# Patient Record
Sex: FEMALE | Race: WHITE | Employment: OTHER | URBAN - METROPOLITAN AREA
[De-identification: names, ages, dates, MRNs, and addresses within clinical notes are randomized per-mention and may not be internally consistent; named-entity substitution may affect disease eponyms.]

---

## 2017-05-12 ENCOUNTER — APPOINTMENT (OUTPATIENT)
Dept: GENERAL RADIOLOGY | Age: 82
End: 2017-05-12
Payer: MEDICARE

## 2017-05-12 ENCOUNTER — HOSPITAL ENCOUNTER (EMERGENCY)
Age: 82
Discharge: HOME | End: 2017-05-12
Attending: EMERGENCY MEDICINE
Payer: MEDICARE

## 2017-05-12 VITALS
RESPIRATION RATE: 16 BRPM | WEIGHT: 180 LBS | HEART RATE: 76 BPM | SYSTOLIC BLOOD PRESSURE: 146 MMHG | HEIGHT: 62 IN | DIASTOLIC BLOOD PRESSURE: 82 MMHG | TEMPERATURE: 98 F | OXYGEN SATURATION: 100 % | BODY MASS INDEX: 33.13 KG/M2

## 2017-05-12 DIAGNOSIS — S43.401A SPRAIN OF RIGHT SHOULDER, UNSPECIFIED SHOULDER SPRAIN TYPE, INITIAL ENCOUNTER: Primary | ICD-10-CM

## 2017-05-12 PROCEDURE — 99283 EMERGENCY DEPT VISIT LOW MDM: CPT

## 2017-05-12 PROCEDURE — 73030 X-RAY EXAM OF SHOULDER: CPT

## 2017-05-12 RX ORDER — IBUPROFEN 400 MG/1
400 TABLET ORAL 2 TIMES DAILY PRN
Qty: 14 TABLET | Refills: 0 | Status: SHIPPED | OUTPATIENT
Start: 2017-05-12 | End: 2017-05-17 | Stop reason: ALTCHOICE

## 2017-05-12 ASSESSMENT — ENCOUNTER SYMPTOMS
GASTROINTESTINAL NEGATIVE: 1
RESPIRATORY NEGATIVE: 1
ALLERGIC/IMMUNOLOGIC NEGATIVE: 1
EYES NEGATIVE: 1

## 2017-05-12 ASSESSMENT — PAIN SCALES - GENERAL: PAINLEVEL_OUTOF10: 9

## 2017-05-12 ASSESSMENT — PAIN DESCRIPTION - ORIENTATION: ORIENTATION: RIGHT

## 2017-05-12 ASSESSMENT — PAIN DESCRIPTION - FREQUENCY: FREQUENCY: CONTINUOUS

## 2017-05-12 ASSESSMENT — PAIN DESCRIPTION - DESCRIPTORS: DESCRIPTORS: ACHING;SORE

## 2017-05-12 ASSESSMENT — PAIN DESCRIPTION - LOCATION: LOCATION: SHOULDER

## 2017-05-17 ENCOUNTER — OFFICE VISIT (OUTPATIENT)
Dept: FAMILY MEDICINE CLINIC | Age: 82
End: 2017-05-17
Payer: MEDICARE

## 2017-05-17 VITALS
BODY MASS INDEX: 32.37 KG/M2 | TEMPERATURE: 98.6 F | HEART RATE: 68 BPM | DIASTOLIC BLOOD PRESSURE: 74 MMHG | WEIGHT: 177 LBS | SYSTOLIC BLOOD PRESSURE: 114 MMHG | OXYGEN SATURATION: 96 %

## 2017-05-17 DIAGNOSIS — S43.91XD SPRAIN OF RIGHT SHOULDER GIRDLE, SUBSEQUENT ENCOUNTER: Primary | ICD-10-CM

## 2017-05-17 DIAGNOSIS — R42 DIZZINESS, NONSPECIFIC: ICD-10-CM

## 2017-05-17 DIAGNOSIS — Z91.81 AT HIGH RISK FOR FALLS: ICD-10-CM

## 2017-05-17 DIAGNOSIS — Z09 HOSPITAL DISCHARGE FOLLOW-UP: ICD-10-CM

## 2017-05-17 DIAGNOSIS — M19.011 PRIMARY OSTEOARTHRITIS OF RIGHT SHOULDER: ICD-10-CM

## 2017-05-17 DIAGNOSIS — L98.9 EXTERNAL NASAL LESION: ICD-10-CM

## 2017-05-17 DIAGNOSIS — J06.9 VIRAL URI WITH COUGH: ICD-10-CM

## 2017-05-17 PROCEDURE — G8417 CALC BMI ABV UP PARAM F/U: HCPCS | Performed by: INTERNAL MEDICINE

## 2017-05-17 PROCEDURE — G8427 DOCREV CUR MEDS BY ELIG CLIN: HCPCS | Performed by: INTERNAL MEDICINE

## 2017-05-17 PROCEDURE — 99214 OFFICE O/P EST MOD 30 MIN: CPT | Performed by: INTERNAL MEDICINE

## 2017-05-17 PROCEDURE — 1036F TOBACCO NON-USER: CPT | Performed by: INTERNAL MEDICINE

## 2017-05-17 PROCEDURE — 1123F ACP DISCUSS/DSCN MKR DOCD: CPT | Performed by: INTERNAL MEDICINE

## 2017-05-17 PROCEDURE — 4040F PNEUMOC VAC/ADMIN/RCVD: CPT | Performed by: INTERNAL MEDICINE

## 2017-05-17 PROCEDURE — 1090F PRES/ABSN URINE INCON ASSESS: CPT | Performed by: INTERNAL MEDICINE

## 2017-05-17 RX ORDER — COVID-19 ANTIGEN TEST
1 KIT MISCELLANEOUS PRN
COMMUNITY
End: 2017-06-14 | Stop reason: ALTCHOICE

## 2017-05-17 RX ORDER — NAPROXEN 500 MG/1
500 TABLET ORAL 2 TIMES DAILY WITH MEALS
Qty: 28 TABLET | Refills: 0 | Status: SHIPPED | OUTPATIENT
Start: 2017-05-17 | End: 2017-06-20

## 2017-05-17 RX ORDER — OXYMETAZOLINE HYDROCHLORIDE 0.05 G/100ML
2 SPRAY NASAL 2 TIMES DAILY
Qty: 1 BOTTLE | Refills: 0 | Status: SHIPPED | OUTPATIENT
Start: 2017-05-17 | End: 2017-05-20

## 2017-06-07 ENCOUNTER — OFFICE VISIT (OUTPATIENT)
Dept: FAMILY MEDICINE CLINIC | Age: 82
End: 2017-06-07
Payer: MEDICARE

## 2017-06-07 VITALS
BODY MASS INDEX: 32.92 KG/M2 | SYSTOLIC BLOOD PRESSURE: 135 MMHG | WEIGHT: 180 LBS | TEMPERATURE: 97 F | HEART RATE: 51 BPM | DIASTOLIC BLOOD PRESSURE: 71 MMHG | OXYGEN SATURATION: 97 %

## 2017-06-07 DIAGNOSIS — Z86.69 HISTORY OF MYASTHENIA GRAVIS: ICD-10-CM

## 2017-06-07 DIAGNOSIS — G89.29 CHRONIC NONINTRACTABLE HEADACHE, UNSPECIFIED HEADACHE TYPE: ICD-10-CM

## 2017-06-07 DIAGNOSIS — Z09 HOSPITAL DISCHARGE FOLLOW-UP: ICD-10-CM

## 2017-06-07 DIAGNOSIS — M32.9 PERSONAL HISTORY OF SYSTEMIC LUPUS ERYTHEMATOSUS (SLE) (HCC): ICD-10-CM

## 2017-06-07 DIAGNOSIS — I67.82 SUBCORTICAL MICROVASCULAR ISCHEMIC OCCLUSIVE DISEASE: ICD-10-CM

## 2017-06-07 DIAGNOSIS — E78.00 PURE HYPERCHOLESTEROLEMIA: ICD-10-CM

## 2017-06-07 DIAGNOSIS — I72.9 ANEURYSM (HCC): Primary | ICD-10-CM

## 2017-06-07 DIAGNOSIS — R51.9 CHRONIC NONINTRACTABLE HEADACHE, UNSPECIFIED HEADACHE TYPE: ICD-10-CM

## 2017-06-07 DIAGNOSIS — I10 ESSENTIAL HYPERTENSION: ICD-10-CM

## 2017-06-07 PROCEDURE — 1036F TOBACCO NON-USER: CPT | Performed by: INTERNAL MEDICINE

## 2017-06-07 PROCEDURE — 99214 OFFICE O/P EST MOD 30 MIN: CPT | Performed by: INTERNAL MEDICINE

## 2017-06-07 PROCEDURE — G8417 CALC BMI ABV UP PARAM F/U: HCPCS | Performed by: INTERNAL MEDICINE

## 2017-06-07 PROCEDURE — 1090F PRES/ABSN URINE INCON ASSESS: CPT | Performed by: INTERNAL MEDICINE

## 2017-06-07 PROCEDURE — G8427 DOCREV CUR MEDS BY ELIG CLIN: HCPCS | Performed by: INTERNAL MEDICINE

## 2017-06-07 PROCEDURE — 1123F ACP DISCUSS/DSCN MKR DOCD: CPT | Performed by: INTERNAL MEDICINE

## 2017-06-07 PROCEDURE — 4040F PNEUMOC VAC/ADMIN/RCVD: CPT | Performed by: INTERNAL MEDICINE

## 2017-06-07 RX ORDER — COVID-19 ANTIGEN TEST
1 KIT MISCELLANEOUS DAILY
COMMUNITY
End: 2017-06-07 | Stop reason: SDUPTHER

## 2017-06-07 RX ORDER — NIFEDIPINE 30 MG/1
30 TABLET, EXTENDED RELEASE ORAL DAILY
Qty: 30 TABLET | Refills: 3 | Status: ON HOLD | OUTPATIENT
Start: 2017-06-07 | End: 2017-06-27 | Stop reason: HOSPADM

## 2017-06-07 RX ORDER — BUTALBITAL, ACETAMINOPHEN AND CAFFEINE 50; 325; 40 MG/1; MG/1; MG/1
1 TABLET ORAL PRN
Refills: 0 | COMMUNITY
Start: 2017-06-05 | End: 2017-09-25 | Stop reason: ALTCHOICE

## 2017-06-07 RX ORDER — MECLIZINE HYDROCHLORIDE 25 MG/1
1 TABLET ORAL 3 TIMES DAILY
Refills: 0 | COMMUNITY
Start: 2017-06-05 | End: 2018-05-08 | Stop reason: SDUPTHER

## 2017-06-07 RX ORDER — PROPRANOLOL HYDROCHLORIDE 20 MG/1
1 TABLET ORAL 2 TIMES DAILY
Refills: 0 | COMMUNITY
Start: 2017-06-05 | End: 2017-06-20 | Stop reason: SDUPTHER

## 2017-06-08 ENCOUNTER — HOSPITAL ENCOUNTER (OUTPATIENT)
Age: 82
Setting detail: SPECIMEN
Discharge: HOME | End: 2017-06-08
Payer: MEDICARE

## 2017-06-08 DIAGNOSIS — M32.9 PERSONAL HISTORY OF SYSTEMIC LUPUS ERYTHEMATOSUS (SLE) (HCC): ICD-10-CM

## 2017-06-08 DIAGNOSIS — E78.00 PURE HYPERCHOLESTEROLEMIA: ICD-10-CM

## 2017-06-08 LAB
ALBUMIN SERPL-MCNC: 3.9 G/DL (ref 3.5–5.2)
ALBUMIN/GLOBULIN RATIO: 1.3 (ref 1–2.5)
ALP BLD-CCNC: 73 U/L (ref 35–104)
ALT SERPL-CCNC: 9 U/L (ref 5–33)
ANION GAP SERPL CALCULATED.3IONS-SCNC: 10 MMOL/L (ref 9–17)
AST SERPL-CCNC: 17 U/L
BILIRUB SERPL-MCNC: 0.51 MG/DL (ref 0.3–1.2)
BUN BLDV-MCNC: 15 MG/DL (ref 8–23)
BUN/CREAT BLD: NORMAL (ref 9–20)
CALCIUM SERPL-MCNC: 9.2 MG/DL (ref 8.6–10.4)
CHLORIDE BLD-SCNC: 103 MMOL/L (ref 98–107)
CHOLESTEROL/HDL RATIO: 2
CHOLESTEROL: 172 MG/DL
CO2: 28 MMOL/L (ref 20–31)
CREAT SERPL-MCNC: 0.77 MG/DL (ref 0.5–0.9)
GFR AFRICAN AMERICAN: >60 ML/MIN
GFR NON-AFRICAN AMERICAN: >60 ML/MIN
GFR SERPL CREATININE-BSD FRML MDRD: NORMAL ML/MIN/{1.73_M2}
GFR SERPL CREATININE-BSD FRML MDRD: NORMAL ML/MIN/{1.73_M2}
GLUCOSE BLD-MCNC: 87 MG/DL (ref 70–99)
HDLC SERPL-MCNC: 85 MG/DL
LDL CHOLESTEROL: 74 MG/DL (ref 0–130)
POTASSIUM SERPL-SCNC: 4.9 MMOL/L (ref 3.7–5.3)
SODIUM BLD-SCNC: 141 MMOL/L (ref 135–144)
TOTAL PROTEIN: 7 G/DL (ref 6.4–8.3)
TRIGL SERPL-MCNC: 67 MG/DL
VLDLC SERPL CALC-MCNC: NORMAL MG/DL (ref 1–30)

## 2017-06-09 LAB — ANTI-NUCLEAR ANTIBODY (ANA): NEGATIVE

## 2017-06-12 LAB
ANTICARDIOLIPIN IGA ANTIBODY: 10.8 APU
ANTICARDIOLIPIN IGG ANTIBODY: 5 GPU
CARDIOLIPIN AB IGM: 3.8 MPU
DILUTE RUSSELL VIPER VENOM TIME: NORMAL
INR BLD: 1
LUPUS ANTICOAG: NORMAL
PARTIAL THROMBOPLASTIN TIME: 23.5 SEC (ref 21.3–31.3)
PROTHROMBIN TIME: 10.6 SEC (ref 9.4–12.6)

## 2017-06-14 ENCOUNTER — INITIAL CONSULT (OUTPATIENT)
Dept: NEUROLOGY | Age: 82
End: 2017-06-14
Payer: MEDICARE

## 2017-06-14 VITALS
WEIGHT: 177.8 LBS | SYSTOLIC BLOOD PRESSURE: 123 MMHG | HEIGHT: 60 IN | BODY MASS INDEX: 34.91 KG/M2 | HEART RATE: 76 BPM | DIASTOLIC BLOOD PRESSURE: 70 MMHG

## 2017-06-14 DIAGNOSIS — I72.5 BASILAR ARTERY ANEURYSM (HCC): Primary | ICD-10-CM

## 2017-06-14 PROCEDURE — 1123F ACP DISCUSS/DSCN MKR DOCD: CPT | Performed by: PSYCHIATRY & NEUROLOGY

## 2017-06-14 PROCEDURE — 1090F PRES/ABSN URINE INCON ASSESS: CPT | Performed by: PSYCHIATRY & NEUROLOGY

## 2017-06-14 PROCEDURE — G8417 CALC BMI ABV UP PARAM F/U: HCPCS | Performed by: PSYCHIATRY & NEUROLOGY

## 2017-06-14 PROCEDURE — 4040F PNEUMOC VAC/ADMIN/RCVD: CPT | Performed by: PSYCHIATRY & NEUROLOGY

## 2017-06-14 PROCEDURE — G8427 DOCREV CUR MEDS BY ELIG CLIN: HCPCS | Performed by: PSYCHIATRY & NEUROLOGY

## 2017-06-14 PROCEDURE — 99204 OFFICE O/P NEW MOD 45 MIN: CPT | Performed by: PSYCHIATRY & NEUROLOGY

## 2017-06-14 PROCEDURE — 1036F TOBACCO NON-USER: CPT | Performed by: PSYCHIATRY & NEUROLOGY

## 2017-06-14 ASSESSMENT — ENCOUNTER SYMPTOMS
COLOR CHANGE: 0
NAUSEA: 0
SHORTNESS OF BREATH: 0
VOMITING: 0
VOICE CHANGE: 0
COUGH: 0
PHOTOPHOBIA: 0

## 2017-06-19 ENCOUNTER — TELEPHONE (OUTPATIENT)
Dept: NEUROLOGY | Age: 82
End: 2017-06-19

## 2017-06-20 ENCOUNTER — OFFICE VISIT (OUTPATIENT)
Dept: FAMILY MEDICINE CLINIC | Age: 82
End: 2017-06-20
Payer: MEDICARE

## 2017-06-20 VITALS — HEART RATE: 54 BPM | DIASTOLIC BLOOD PRESSURE: 69 MMHG | SYSTOLIC BLOOD PRESSURE: 120 MMHG | OXYGEN SATURATION: 98 %

## 2017-06-20 DIAGNOSIS — I10 ESSENTIAL HYPERTENSION: ICD-10-CM

## 2017-06-20 DIAGNOSIS — G43.009 MIGRAINE WITHOUT AURA AND WITHOUT STATUS MIGRAINOSUS, NOT INTRACTABLE: Primary | ICD-10-CM

## 2017-06-20 DIAGNOSIS — M79.604 LEG PAIN, POSTERIOR, RIGHT: ICD-10-CM

## 2017-06-20 DIAGNOSIS — I67.82 SUBCORTICAL MICROVASCULAR ISCHEMIC OCCLUSIVE DISEASE: ICD-10-CM

## 2017-06-20 PROCEDURE — G8417 CALC BMI ABV UP PARAM F/U: HCPCS | Performed by: INTERNAL MEDICINE

## 2017-06-20 PROCEDURE — 1036F TOBACCO NON-USER: CPT | Performed by: INTERNAL MEDICINE

## 2017-06-20 PROCEDURE — 4040F PNEUMOC VAC/ADMIN/RCVD: CPT | Performed by: INTERNAL MEDICINE

## 2017-06-20 PROCEDURE — 1123F ACP DISCUSS/DSCN MKR DOCD: CPT | Performed by: INTERNAL MEDICINE

## 2017-06-20 PROCEDURE — G8427 DOCREV CUR MEDS BY ELIG CLIN: HCPCS | Performed by: INTERNAL MEDICINE

## 2017-06-20 PROCEDURE — 99214 OFFICE O/P EST MOD 30 MIN: CPT | Performed by: INTERNAL MEDICINE

## 2017-06-20 PROCEDURE — G8598 ASA/ANTIPLAT THER USED: HCPCS | Performed by: INTERNAL MEDICINE

## 2017-06-20 PROCEDURE — 1090F PRES/ABSN URINE INCON ASSESS: CPT | Performed by: INTERNAL MEDICINE

## 2017-06-20 RX ORDER — TRAMADOL HYDROCHLORIDE 50 MG/1
50 TABLET ORAL EVERY 6 HOURS PRN
Qty: 20 TABLET | Refills: 0 | Status: SHIPPED | OUTPATIENT
Start: 2017-06-20 | End: 2017-06-20 | Stop reason: SDUPTHER

## 2017-06-20 RX ORDER — TRAMADOL HYDROCHLORIDE 50 MG/1
50 TABLET ORAL EVERY 6 HOURS PRN
Qty: 20 TABLET | Refills: 0 | Status: SHIPPED | OUTPATIENT
Start: 2017-06-20 | End: 2017-06-30

## 2017-06-20 RX ORDER — PROPRANOLOL HYDROCHLORIDE 40 MG/1
40 TABLET ORAL 2 TIMES DAILY
Qty: 60 TABLET | Refills: 1 | Status: ON HOLD | OUTPATIENT
Start: 2017-06-20 | End: 2017-06-27 | Stop reason: HOSPADM

## 2017-06-20 RX ORDER — TIZANIDINE 2 MG/1
2 TABLET ORAL EVERY 6 HOURS PRN
Qty: 30 TABLET | Refills: 0 | Status: ON HOLD | OUTPATIENT
Start: 2017-06-20 | End: 2017-06-27 | Stop reason: HOSPADM

## 2017-06-23 ENCOUNTER — HOSPITAL ENCOUNTER (OUTPATIENT)
Dept: MRI IMAGING | Age: 82
Discharge: HOME | End: 2017-06-23
Payer: MEDICARE

## 2017-06-23 ENCOUNTER — HOSPITAL ENCOUNTER (OUTPATIENT)
Dept: VASCULAR LAB | Age: 82
Discharge: HOME | End: 2017-06-23
Payer: MEDICARE

## 2017-06-23 DIAGNOSIS — I72.5 BASILAR ARTERY ANEURYSM (HCC): ICD-10-CM

## 2017-06-23 DIAGNOSIS — M79.652 PAIN IN LEFT THIGH: ICD-10-CM

## 2017-06-23 DIAGNOSIS — M79.605 PAIN IN BOTH LOWER EXTREMITIES: ICD-10-CM

## 2017-06-23 DIAGNOSIS — I63.9 CEREBROVASCULAR ACCIDENT (CVA), UNSPECIFIED MECHANISM (HCC): Primary | ICD-10-CM

## 2017-06-23 DIAGNOSIS — M79.604 PAIN IN BOTH LOWER EXTREMITIES: ICD-10-CM

## 2017-06-23 PROCEDURE — 93970 EXTREMITY STUDY: CPT

## 2017-06-23 PROCEDURE — 93880 EXTRACRANIAL BILAT STUDY: CPT

## 2017-06-23 PROCEDURE — 70544 MR ANGIOGRAPHY HEAD W/O DYE: CPT

## 2017-06-26 ENCOUNTER — APPOINTMENT (OUTPATIENT)
Dept: MRI IMAGING | Age: 82
DRG: 310 | End: 2017-06-26
Payer: MEDICARE

## 2017-06-26 ENCOUNTER — APPOINTMENT (OUTPATIENT)
Dept: GENERAL RADIOLOGY | Age: 82
DRG: 310 | End: 2017-06-26
Payer: MEDICARE

## 2017-06-26 ENCOUNTER — APPOINTMENT (OUTPATIENT)
Dept: CT IMAGING | Age: 82
DRG: 310 | End: 2017-06-26
Payer: MEDICARE

## 2017-06-26 ENCOUNTER — HOSPITAL ENCOUNTER (INPATIENT)
Age: 82
LOS: 1 days | Discharge: HOME HEALTH | DRG: 310 | End: 2017-06-27
Attending: EMERGENCY MEDICINE | Admitting: INTERNAL MEDICINE
Payer: MEDICARE

## 2017-06-26 DIAGNOSIS — R42 DIZZINESS: Primary | ICD-10-CM

## 2017-06-26 PROBLEM — G43.009 MIGRAINE WITHOUT AURA AND WITHOUT STATUS MIGRAINOSUS, NOT INTRACTABLE: Status: ACTIVE | Noted: 2017-06-26

## 2017-06-26 LAB
ABSOLUTE EOS #: 0.1 K/UL (ref 0–0.4)
ABSOLUTE LYMPH #: 1.5 K/UL (ref 1–4.8)
ABSOLUTE MONO #: 0.6 K/UL (ref 0.1–1.2)
ANION GAP SERPL CALCULATED.3IONS-SCNC: 13 MMOL/L (ref 9–17)
BASOPHILS # BLD: 1 %
BASOPHILS ABSOLUTE: 0.1 K/UL (ref 0–0.2)
BUN BLDV-MCNC: 12 MG/DL (ref 8–23)
BUN/CREAT BLD: ABNORMAL (ref 9–20)
CALCIUM SERPL-MCNC: 9.7 MG/DL (ref 8.6–10.4)
CHLORIDE BLD-SCNC: 102 MMOL/L (ref 98–107)
CO2: 27 MMOL/L (ref 20–31)
CREAT SERPL-MCNC: 0.74 MG/DL (ref 0.5–0.9)
DIFFERENTIAL TYPE: NORMAL
EOSINOPHILS RELATIVE PERCENT: 1 %
GFR AFRICAN AMERICAN: >60 ML/MIN
GFR NON-AFRICAN AMERICAN: >60 ML/MIN
GFR SERPL CREATININE-BSD FRML MDRD: ABNORMAL ML/MIN/{1.73_M2}
GFR SERPL CREATININE-BSD FRML MDRD: ABNORMAL ML/MIN/{1.73_M2}
GLUCOSE BLD-MCNC: 100 MG/DL (ref 70–99)
HCT VFR BLD CALC: 42.3 % (ref 36–46)
HEMOGLOBIN: 14.1 G/DL (ref 12–16)
LYMPHOCYTES # BLD: 22 %
MAGNESIUM: 2.2 MG/DL (ref 1.6–2.6)
MCH RBC QN AUTO: 31 PG (ref 26–34)
MCHC RBC AUTO-ENTMCNC: 33.3 G/DL (ref 31–37)
MCV RBC AUTO: 92.9 FL (ref 80–100)
MONOCYTES # BLD: 8 %
PDW BLD-RTO: 13.5 % (ref 12.5–15.4)
PLATELET # BLD: 238 K/UL (ref 140–450)
PLATELET ESTIMATE: NORMAL
PMV BLD AUTO: 9.6 FL (ref 6–12)
POC TROPONIN I: 0.01 NG/ML (ref 0–0.1)
POC TROPONIN INTERP: NORMAL
POTASSIUM SERPL-SCNC: 4.2 MMOL/L (ref 3.7–5.3)
RBC # BLD: 4.55 M/UL (ref 4–5.2)
RBC # BLD: NORMAL 10*6/UL
SEG NEUTROPHILS: 68 %
SEGMENTED NEUTROPHILS ABSOLUTE COUNT: 4.7 K/UL (ref 1.8–7.7)
SODIUM BLD-SCNC: 142 MMOL/L (ref 135–144)
TROPONIN INTERP: NORMAL
TROPONIN INTERP: NORMAL
TROPONIN T: <0.03 NG/ML
TROPONIN T: <0.03 NG/ML
TSH SERPL DL<=0.05 MIU/L-ACNC: 3.92 MIU/L (ref 0.3–5)
WBC # BLD: 6.9 K/UL (ref 3.5–11)
WBC # BLD: NORMAL 10*3/UL

## 2017-06-26 PROCEDURE — 71020 XR CHEST STANDARD TWO VW: CPT

## 2017-06-26 PROCEDURE — 84484 ASSAY OF TROPONIN QUANT: CPT

## 2017-06-26 PROCEDURE — 36415 COLL VENOUS BLD VENIPUNCTURE: CPT

## 2017-06-26 PROCEDURE — 1200000000 HC SEMI PRIVATE

## 2017-06-26 PROCEDURE — 85025 COMPLETE CBC W/AUTO DIFF WBC: CPT

## 2017-06-26 PROCEDURE — 2580000003 HC RX 258: Performed by: EMERGENCY MEDICINE

## 2017-06-26 PROCEDURE — 80048 BASIC METABOLIC PNL TOTAL CA: CPT

## 2017-06-26 PROCEDURE — 70450 CT HEAD/BRAIN W/O DYE: CPT

## 2017-06-26 PROCEDURE — 6370000000 HC RX 637 (ALT 250 FOR IP): Performed by: STUDENT IN AN ORGANIZED HEALTH CARE EDUCATION/TRAINING PROGRAM

## 2017-06-26 PROCEDURE — 99222 1ST HOSP IP/OBS MODERATE 55: CPT | Performed by: PSYCHIATRY & NEUROLOGY

## 2017-06-26 PROCEDURE — 83735 ASSAY OF MAGNESIUM: CPT

## 2017-06-26 PROCEDURE — 99285 EMERGENCY DEPT VISIT HI MDM: CPT

## 2017-06-26 PROCEDURE — 2580000003 HC RX 258: Performed by: STUDENT IN AN ORGANIZED HEALTH CARE EDUCATION/TRAINING PROGRAM

## 2017-06-26 PROCEDURE — 93005 ELECTROCARDIOGRAM TRACING: CPT

## 2017-06-26 PROCEDURE — 84443 ASSAY THYROID STIM HORMONE: CPT

## 2017-06-26 PROCEDURE — 70551 MRI BRAIN STEM W/O DYE: CPT

## 2017-06-26 RX ORDER — SODIUM CHLORIDE 9 MG/ML
INJECTION, SOLUTION INTRAVENOUS CONTINUOUS
Status: DISCONTINUED | OUTPATIENT
Start: 2017-06-26 | End: 2017-06-27 | Stop reason: HOSPADM

## 2017-06-26 RX ORDER — BUTALBITAL, ACETAMINOPHEN AND CAFFEINE 50; 325; 40 MG/1; MG/1; MG/1
1 TABLET ORAL EVERY 6 HOURS PRN
Status: DISCONTINUED | OUTPATIENT
Start: 2017-06-26 | End: 2017-06-27 | Stop reason: HOSPADM

## 2017-06-26 RX ORDER — ONDANSETRON 2 MG/ML
4 INJECTION INTRAMUSCULAR; INTRAVENOUS EVERY 6 HOURS PRN
Status: DISCONTINUED | OUTPATIENT
Start: 2017-06-26 | End: 2017-06-27 | Stop reason: HOSPADM

## 2017-06-26 RX ORDER — SODIUM CHLORIDE 0.9 % (FLUSH) 0.9 %
10 SYRINGE (ML) INJECTION PRN
Status: DISCONTINUED | OUTPATIENT
Start: 2017-06-26 | End: 2017-06-27 | Stop reason: HOSPADM

## 2017-06-26 RX ORDER — SODIUM CHLORIDE 0.9 % (FLUSH) 0.9 %
10 SYRINGE (ML) INJECTION PRN
Status: DISCONTINUED | OUTPATIENT
Start: 2017-06-26 | End: 2017-06-26

## 2017-06-26 RX ORDER — MECLIZINE HCL 12.5 MG/1
12.5 TABLET ORAL 3 TIMES DAILY PRN
Status: DISCONTINUED | OUTPATIENT
Start: 2017-06-26 | End: 2017-06-27 | Stop reason: HOSPADM

## 2017-06-26 RX ORDER — ASPIRIN 81 MG/1
81 TABLET, CHEWABLE ORAL DAILY
Status: DISCONTINUED | OUTPATIENT
Start: 2017-06-26 | End: 2017-06-27 | Stop reason: HOSPADM

## 2017-06-26 RX ORDER — ACETAMINOPHEN 325 MG/1
650 TABLET ORAL EVERY 4 HOURS PRN
Status: DISCONTINUED | OUTPATIENT
Start: 2017-06-26 | End: 2017-06-26

## 2017-06-26 RX ORDER — ACETAMINOPHEN 325 MG/1
650 TABLET ORAL EVERY 4 HOURS PRN
Status: DISCONTINUED | OUTPATIENT
Start: 2017-06-26 | End: 2017-06-27 | Stop reason: HOSPADM

## 2017-06-26 RX ORDER — SODIUM CHLORIDE 0.9 % (FLUSH) 0.9 %
10 SYRINGE (ML) INJECTION EVERY 12 HOURS SCHEDULED
Status: DISCONTINUED | OUTPATIENT
Start: 2017-06-26 | End: 2017-06-27 | Stop reason: HOSPADM

## 2017-06-26 RX ORDER — TRAMADOL HYDROCHLORIDE 50 MG/1
50 TABLET ORAL EVERY 6 HOURS PRN
Status: DISCONTINUED | OUTPATIENT
Start: 2017-06-26 | End: 2017-06-27 | Stop reason: HOSPADM

## 2017-06-26 RX ORDER — 0.9 % SODIUM CHLORIDE 0.9 %
500 INTRAVENOUS SOLUTION INTRAVENOUS ONCE
Status: COMPLETED | OUTPATIENT
Start: 2017-06-26 | End: 2017-06-26

## 2017-06-26 RX ORDER — NITROGLYCERIN 0.4 MG/1
0.4 TABLET SUBLINGUAL EVERY 5 MIN PRN
Status: DISCONTINUED | OUTPATIENT
Start: 2017-06-26 | End: 2017-06-27 | Stop reason: HOSPADM

## 2017-06-26 RX ORDER — SODIUM CHLORIDE 0.9 % (FLUSH) 0.9 %
10 SYRINGE (ML) INJECTION EVERY 12 HOURS SCHEDULED
Status: DISCONTINUED | OUTPATIENT
Start: 2017-06-26 | End: 2017-06-26

## 2017-06-26 RX ADMIN — ASPIRIN 81 MG: 81 TABLET, CHEWABLE ORAL at 13:07

## 2017-06-26 RX ADMIN — SODIUM CHLORIDE 500 ML: 9 INJECTION, SOLUTION INTRAVENOUS at 09:28

## 2017-06-26 RX ADMIN — SODIUM CHLORIDE: 9 INJECTION, SOLUTION INTRAVENOUS at 13:07

## 2017-06-26 ASSESSMENT — ENCOUNTER SYMPTOMS
SORE THROAT: 0
SHORTNESS OF BREATH: 0
COUGH: 0
BLOOD IN STOOL: 0
ABDOMINAL PAIN: 0
DIARRHEA: 0
RHINORRHEA: 0
VOMITING: 0
CONSTIPATION: 0
NAUSEA: 0

## 2017-06-27 VITALS
DIASTOLIC BLOOD PRESSURE: 64 MMHG | SYSTOLIC BLOOD PRESSURE: 129 MMHG | TEMPERATURE: 97.5 F | WEIGHT: 177 LBS | HEIGHT: 60 IN | HEART RATE: 51 BPM | OXYGEN SATURATION: 98 % | BODY MASS INDEX: 34.75 KG/M2 | RESPIRATION RATE: 16 BRPM

## 2017-06-27 LAB
ANION GAP SERPL CALCULATED.3IONS-SCNC: 13 MMOL/L (ref 9–17)
BUN BLDV-MCNC: 18 MG/DL (ref 8–23)
BUN/CREAT BLD: NORMAL (ref 9–20)
CALCIUM SERPL-MCNC: 9.2 MG/DL (ref 8.6–10.4)
CHLORIDE BLD-SCNC: 105 MMOL/L (ref 98–107)
CO2: 22 MMOL/L (ref 20–31)
CREAT SERPL-MCNC: 0.7 MG/DL (ref 0.5–0.9)
EKG ATRIAL RATE: 60 BPM
EKG P AXIS: 51 DEGREES
EKG P-R INTERVAL: 142 MS
EKG Q-T INTERVAL: 418 MS
EKG QRS DURATION: 82 MS
EKG QTC CALCULATION (BAZETT): 418 MS
EKG R AXIS: -8 DEGREES
EKG T AXIS: 52 DEGREES
EKG VENTRICULAR RATE: 60 BPM
GFR AFRICAN AMERICAN: >60 ML/MIN
GFR NON-AFRICAN AMERICAN: >60 ML/MIN
GFR SERPL CREATININE-BSD FRML MDRD: NORMAL ML/MIN/{1.73_M2}
GFR SERPL CREATININE-BSD FRML MDRD: NORMAL ML/MIN/{1.73_M2}
GLUCOSE BLD-MCNC: 97 MG/DL (ref 70–99)
LV EF: 55 %
LVEF MODALITY: NORMAL
MAGNESIUM: 2 MG/DL (ref 1.6–2.6)
POTASSIUM SERPL-SCNC: 3.9 MMOL/L (ref 3.7–5.3)
SODIUM BLD-SCNC: 140 MMOL/L (ref 135–144)

## 2017-06-27 PROCEDURE — 97166 OT EVAL MOD COMPLEX 45 MIN: CPT

## 2017-06-27 PROCEDURE — 97530 THERAPEUTIC ACTIVITIES: CPT

## 2017-06-27 PROCEDURE — 99231 SBSQ HOSP IP/OBS SF/LOW 25: CPT

## 2017-06-27 PROCEDURE — G8987 SELF CARE CURRENT STATUS: HCPCS

## 2017-06-27 PROCEDURE — 2580000003 HC RX 258: Performed by: EMERGENCY MEDICINE

## 2017-06-27 PROCEDURE — 6370000000 HC RX 637 (ALT 250 FOR IP): Performed by: EMERGENCY MEDICINE

## 2017-06-27 PROCEDURE — G8988 SELF CARE GOAL STATUS: HCPCS

## 2017-06-27 PROCEDURE — 83735 ASSAY OF MAGNESIUM: CPT

## 2017-06-27 PROCEDURE — 6370000000 HC RX 637 (ALT 250 FOR IP): Performed by: STUDENT IN AN ORGANIZED HEALTH CARE EDUCATION/TRAINING PROGRAM

## 2017-06-27 PROCEDURE — 97162 PT EVAL MOD COMPLEX 30 MIN: CPT

## 2017-06-27 PROCEDURE — 80048 BASIC METABOLIC PNL TOTAL CA: CPT

## 2017-06-27 PROCEDURE — 36415 COLL VENOUS BLD VENIPUNCTURE: CPT

## 2017-06-27 PROCEDURE — 93306 TTE W/DOPPLER COMPLETE: CPT

## 2017-06-27 PROCEDURE — 99233 SBSQ HOSP IP/OBS HIGH 50: CPT | Performed by: INTERNAL MEDICINE

## 2017-06-27 PROCEDURE — G8979 MOBILITY GOAL STATUS: HCPCS

## 2017-06-27 PROCEDURE — G8978 MOBILITY CURRENT STATUS: HCPCS

## 2017-06-27 RX ADMIN — ACETAMINOPHEN 650 MG: 325 TABLET ORAL at 00:55

## 2017-06-27 RX ADMIN — ASPIRIN 81 MG: 81 TABLET, CHEWABLE ORAL at 08:55

## 2017-06-27 RX ADMIN — Medication 10 ML: at 08:56

## 2017-06-27 ASSESSMENT — PAIN SCALES - GENERAL
PAINLEVEL_OUTOF10: 3
PAINLEVEL_OUTOF10: 0
PAINLEVEL_OUTOF10: 5

## 2017-07-05 ENCOUNTER — OFFICE VISIT (OUTPATIENT)
Dept: FAMILY MEDICINE CLINIC | Age: 82
End: 2017-07-05
Payer: MEDICARE

## 2017-07-05 VITALS
BODY MASS INDEX: 34.88 KG/M2 | DIASTOLIC BLOOD PRESSURE: 79 MMHG | OXYGEN SATURATION: 97 % | SYSTOLIC BLOOD PRESSURE: 124 MMHG | HEART RATE: 64 BPM | TEMPERATURE: 97.2 F | WEIGHT: 178.6 LBS

## 2017-07-05 DIAGNOSIS — G43.009 MIGRAINE WITHOUT AURA AND WITHOUT STATUS MIGRAINOSUS, NOT INTRACTABLE: Primary | ICD-10-CM

## 2017-07-05 DIAGNOSIS — M17.0 PRIMARY OSTEOARTHRITIS OF BOTH KNEES: ICD-10-CM

## 2017-07-05 DIAGNOSIS — I10 ESSENTIAL HYPERTENSION: ICD-10-CM

## 2017-07-05 DIAGNOSIS — Z09 HOSPITAL DISCHARGE FOLLOW-UP: ICD-10-CM

## 2017-07-05 DIAGNOSIS — I67.82 SUBCORTICAL MICROVASCULAR ISCHEMIC OCCLUSIVE DISEASE: ICD-10-CM

## 2017-07-05 PROCEDURE — 1090F PRES/ABSN URINE INCON ASSESS: CPT | Performed by: INTERNAL MEDICINE

## 2017-07-05 PROCEDURE — G8598 ASA/ANTIPLAT THER USED: HCPCS | Performed by: INTERNAL MEDICINE

## 2017-07-05 PROCEDURE — 1123F ACP DISCUSS/DSCN MKR DOCD: CPT | Performed by: INTERNAL MEDICINE

## 2017-07-05 PROCEDURE — 99214 OFFICE O/P EST MOD 30 MIN: CPT | Performed by: INTERNAL MEDICINE

## 2017-07-05 PROCEDURE — G8427 DOCREV CUR MEDS BY ELIG CLIN: HCPCS | Performed by: INTERNAL MEDICINE

## 2017-07-05 PROCEDURE — 1036F TOBACCO NON-USER: CPT | Performed by: INTERNAL MEDICINE

## 2017-07-05 PROCEDURE — 1111F DSCHRG MED/CURRENT MED MERGE: CPT | Performed by: INTERNAL MEDICINE

## 2017-07-05 PROCEDURE — 4040F PNEUMOC VAC/ADMIN/RCVD: CPT | Performed by: INTERNAL MEDICINE

## 2017-07-05 PROCEDURE — G8417 CALC BMI ABV UP PARAM F/U: HCPCS | Performed by: INTERNAL MEDICINE

## 2017-07-11 ENCOUNTER — TELEPHONE (OUTPATIENT)
Dept: ORTHOPEDIC SURGERY | Age: 82
End: 2017-07-11

## 2017-08-07 DIAGNOSIS — M25.561 PAIN IN BOTH KNEES, UNSPECIFIED CHRONICITY: Primary | ICD-10-CM

## 2017-08-07 DIAGNOSIS — M25.562 PAIN IN BOTH KNEES, UNSPECIFIED CHRONICITY: Primary | ICD-10-CM

## 2017-08-10 ENCOUNTER — OFFICE VISIT (OUTPATIENT)
Dept: ORTHOPEDIC SURGERY | Age: 82
End: 2017-08-10
Payer: MEDICARE

## 2017-08-10 VITALS — WEIGHT: 175 LBS | BODY MASS INDEX: 32.2 KG/M2 | HEIGHT: 62 IN

## 2017-08-10 DIAGNOSIS — M17.0 ARTHRITIS OF BOTH KNEES: Primary | ICD-10-CM

## 2017-08-10 DIAGNOSIS — L91.8 SKIN TAG, ACQUIRED: ICD-10-CM

## 2017-08-10 PROCEDURE — 1090F PRES/ABSN URINE INCON ASSESS: CPT | Performed by: ORTHOPAEDIC SURGERY

## 2017-08-10 PROCEDURE — 20610 DRAIN/INJ JOINT/BURSA W/O US: CPT | Performed by: ORTHOPAEDIC SURGERY

## 2017-08-10 PROCEDURE — G8427 DOCREV CUR MEDS BY ELIG CLIN: HCPCS | Performed by: ORTHOPAEDIC SURGERY

## 2017-08-10 PROCEDURE — 4040F PNEUMOC VAC/ADMIN/RCVD: CPT | Performed by: ORTHOPAEDIC SURGERY

## 2017-08-10 PROCEDURE — G8417 CALC BMI ABV UP PARAM F/U: HCPCS | Performed by: ORTHOPAEDIC SURGERY

## 2017-08-10 PROCEDURE — 99203 OFFICE O/P NEW LOW 30 MIN: CPT | Performed by: ORTHOPAEDIC SURGERY

## 2017-08-10 PROCEDURE — G8598 ASA/ANTIPLAT THER USED: HCPCS | Performed by: ORTHOPAEDIC SURGERY

## 2017-08-10 PROCEDURE — 1123F ACP DISCUSS/DSCN MKR DOCD: CPT | Performed by: ORTHOPAEDIC SURGERY

## 2017-08-10 PROCEDURE — 1036F TOBACCO NON-USER: CPT | Performed by: ORTHOPAEDIC SURGERY

## 2017-08-10 RX ORDER — METHYLPREDNISOLONE ACETATE 80 MG/ML
80 INJECTION, SUSPENSION INTRA-ARTICULAR; INTRALESIONAL; INTRAMUSCULAR; SOFT TISSUE ONCE
Status: COMPLETED | OUTPATIENT
Start: 2017-08-10 | End: 2017-08-11

## 2017-08-10 RX ORDER — BUPIVACAINE HYDROCHLORIDE 2.5 MG/ML
2 INJECTION, SOLUTION INFILTRATION; PERINEURAL ONCE
Status: COMPLETED | OUTPATIENT
Start: 2017-08-10 | End: 2017-08-11

## 2017-08-10 ASSESSMENT — ENCOUNTER SYMPTOMS
SHORTNESS OF BREATH: 1
COUGH: 0
ABDOMINAL PAIN: 0
CONSTIPATION: 0
NAUSEA: 0
CHEST TIGHTNESS: 1
WHEEZING: 0
VOICE CHANGE: 0
DIARRHEA: 0
TROUBLE SWALLOWING: 0
VOMITING: 0
CHOKING: 0

## 2017-08-11 RX ADMIN — BUPIVACAINE HYDROCHLORIDE 5 MG: 2.5 INJECTION, SOLUTION INFILTRATION; PERINEURAL at 14:59

## 2017-08-11 RX ADMIN — METHYLPREDNISOLONE ACETATE 80 MG: 80 INJECTION, SUSPENSION INTRA-ARTICULAR; INTRALESIONAL; INTRAMUSCULAR; SOFT TISSUE at 14:58

## 2017-09-07 ENCOUNTER — OFFICE VISIT (OUTPATIENT)
Dept: ORTHOPEDIC SURGERY | Age: 82
End: 2017-09-07
Payer: MEDICARE

## 2017-09-07 VITALS — WEIGHT: 182 LBS | HEIGHT: 62 IN | BODY MASS INDEX: 33.49 KG/M2

## 2017-09-07 DIAGNOSIS — M17.12 PRIMARY OSTEOARTHRITIS OF LEFT KNEE: ICD-10-CM

## 2017-09-07 DIAGNOSIS — M17.11 PRIMARY OSTEOARTHRITIS OF RIGHT KNEE: Primary | ICD-10-CM

## 2017-09-07 PROCEDURE — 1123F ACP DISCUSS/DSCN MKR DOCD: CPT | Performed by: ORTHOPAEDIC SURGERY

## 2017-09-07 PROCEDURE — 99213 OFFICE O/P EST LOW 20 MIN: CPT | Performed by: ORTHOPAEDIC SURGERY

## 2017-09-07 PROCEDURE — 1090F PRES/ABSN URINE INCON ASSESS: CPT | Performed by: ORTHOPAEDIC SURGERY

## 2017-09-07 PROCEDURE — 20610 DRAIN/INJ JOINT/BURSA W/O US: CPT | Performed by: ORTHOPAEDIC SURGERY

## 2017-09-07 PROCEDURE — 1036F TOBACCO NON-USER: CPT | Performed by: ORTHOPAEDIC SURGERY

## 2017-09-07 PROCEDURE — G8417 CALC BMI ABV UP PARAM F/U: HCPCS | Performed by: ORTHOPAEDIC SURGERY

## 2017-09-07 PROCEDURE — G8427 DOCREV CUR MEDS BY ELIG CLIN: HCPCS | Performed by: ORTHOPAEDIC SURGERY

## 2017-09-07 PROCEDURE — 4040F PNEUMOC VAC/ADMIN/RCVD: CPT | Performed by: ORTHOPAEDIC SURGERY

## 2017-09-07 PROCEDURE — G8598 ASA/ANTIPLAT THER USED: HCPCS | Performed by: ORTHOPAEDIC SURGERY

## 2017-09-07 ASSESSMENT — ENCOUNTER SYMPTOMS
CHOKING: 0
SHORTNESS OF BREATH: 0
NAUSEA: 0
TROUBLE SWALLOWING: 0
ABDOMINAL PAIN: 0
VOICE CHANGE: 0
WHEEZING: 0
DIARRHEA: 0
COUGH: 1
VOMITING: 0
CONSTIPATION: 0

## 2017-09-25 ENCOUNTER — HOSPITAL ENCOUNTER (OUTPATIENT)
Age: 82
Setting detail: SPECIMEN
Discharge: HOME | End: 2017-09-25
Payer: MEDICARE

## 2017-09-25 ENCOUNTER — OFFICE VISIT (OUTPATIENT)
Dept: DERMATOLOGY | Age: 82
End: 2017-09-25
Payer: MEDICARE

## 2017-09-25 VITALS
DIASTOLIC BLOOD PRESSURE: 70 MMHG | BODY MASS INDEX: 33.23 KG/M2 | OXYGEN SATURATION: 94 % | HEART RATE: 72 BPM | HEIGHT: 62 IN | SYSTOLIC BLOOD PRESSURE: 117 MMHG | WEIGHT: 180.6 LBS

## 2017-09-25 DIAGNOSIS — D17.9 SOFT FIBROMA: ICD-10-CM

## 2017-09-25 DIAGNOSIS — D48.5 NEOPLASM OF UNCERTAIN BEHAVIOR OF SKIN: Primary | ICD-10-CM

## 2017-09-25 DIAGNOSIS — L57.0 KERATOSIS, ACTINIC: ICD-10-CM

## 2017-09-25 DIAGNOSIS — L82.1 SEBORRHEIC KERATOSES: ICD-10-CM

## 2017-09-25 PROCEDURE — 99203 OFFICE O/P NEW LOW 30 MIN: CPT | Performed by: DERMATOLOGY

## 2017-09-25 PROCEDURE — G8427 DOCREV CUR MEDS BY ELIG CLIN: HCPCS | Performed by: DERMATOLOGY

## 2017-09-25 PROCEDURE — 11100 PR BIOPSY OF SKIN LESION: CPT | Performed by: DERMATOLOGY

## 2017-09-25 PROCEDURE — 1036F TOBACCO NON-USER: CPT | Performed by: DERMATOLOGY

## 2017-09-25 PROCEDURE — 1090F PRES/ABSN URINE INCON ASSESS: CPT | Performed by: DERMATOLOGY

## 2017-09-25 PROCEDURE — G8598 ASA/ANTIPLAT THER USED: HCPCS | Performed by: DERMATOLOGY

## 2017-09-25 PROCEDURE — G8417 CALC BMI ABV UP PARAM F/U: HCPCS | Performed by: DERMATOLOGY

## 2017-09-25 PROCEDURE — 4040F PNEUMOC VAC/ADMIN/RCVD: CPT | Performed by: DERMATOLOGY

## 2017-09-25 PROCEDURE — 1123F ACP DISCUSS/DSCN MKR DOCD: CPT | Performed by: DERMATOLOGY

## 2017-09-25 RX ORDER — IBUPROFEN 200 MG
200 TABLET ORAL EVERY 6 HOURS PRN
COMMUNITY
End: 2017-09-26 | Stop reason: SDUPTHER

## 2017-09-25 RX ORDER — FLUOROURACIL 50 MG/G
CREAM TOPICAL
Qty: 40 G | Refills: 0 | Status: SHIPPED | OUTPATIENT
Start: 2017-09-25 | End: 2021-09-14

## 2017-09-25 RX ORDER — LIDOCAINE HYDROCHLORIDE AND EPINEPHRINE 10; 10 MG/ML; UG/ML
0.5 INJECTION, SOLUTION INFILTRATION; PERINEURAL ONCE
Status: COMPLETED | OUTPATIENT
Start: 2017-09-25 | End: 2017-09-25

## 2017-09-25 RX ADMIN — LIDOCAINE HYDROCHLORIDE AND EPINEPHRINE 0.5 ML: 10; 10 INJECTION, SOLUTION INFILTRATION; PERINEURAL at 14:42

## 2017-09-26 ENCOUNTER — OFFICE VISIT (OUTPATIENT)
Dept: INTERNAL MEDICINE | Age: 82
End: 2017-09-26
Payer: MEDICARE

## 2017-09-26 VITALS
HEART RATE: 61 BPM | WEIGHT: 183 LBS | SYSTOLIC BLOOD PRESSURE: 146 MMHG | DIASTOLIC BLOOD PRESSURE: 77 MMHG | BODY MASS INDEX: 33.47 KG/M2

## 2017-09-26 DIAGNOSIS — M17.0 PRIMARY OSTEOARTHRITIS OF BOTH KNEES: ICD-10-CM

## 2017-09-26 DIAGNOSIS — I67.82 SUBCORTICAL MICROVASCULAR ISCHEMIC OCCLUSIVE DISEASE: Primary | ICD-10-CM

## 2017-09-26 DIAGNOSIS — I10 ESSENTIAL HYPERTENSION: ICD-10-CM

## 2017-09-26 DIAGNOSIS — G43.009 MIGRAINE WITHOUT AURA AND WITHOUT STATUS MIGRAINOSUS, NOT INTRACTABLE: ICD-10-CM

## 2017-09-26 PROBLEM — M19.011 PRIMARY OSTEOARTHRITIS OF RIGHT SHOULDER: Status: RESOLVED | Noted: 2017-05-17 | Resolved: 2017-09-26

## 2017-09-26 PROBLEM — M17.10 PRIMARY OSTEOARTHRITIS OF KNEE: Status: ACTIVE | Noted: 2017-09-26

## 2017-09-26 PROCEDURE — G0444 DEPRESSION SCREEN ANNUAL: HCPCS | Performed by: HOSPITALIST

## 2017-09-26 PROCEDURE — G8427 DOCREV CUR MEDS BY ELIG CLIN: HCPCS | Performed by: HOSPITALIST

## 2017-09-26 PROCEDURE — G8417 CALC BMI ABV UP PARAM F/U: HCPCS | Performed by: HOSPITALIST

## 2017-09-26 PROCEDURE — 1123F ACP DISCUSS/DSCN MKR DOCD: CPT | Performed by: HOSPITALIST

## 2017-09-26 PROCEDURE — 99214 OFFICE O/P EST MOD 30 MIN: CPT | Performed by: HOSPITALIST

## 2017-09-26 PROCEDURE — 99212 OFFICE O/P EST SF 10 MIN: CPT

## 2017-09-26 PROCEDURE — G8598 ASA/ANTIPLAT THER USED: HCPCS | Performed by: HOSPITALIST

## 2017-09-26 PROCEDURE — 1036F TOBACCO NON-USER: CPT | Performed by: HOSPITALIST

## 2017-09-26 PROCEDURE — 1090F PRES/ABSN URINE INCON ASSESS: CPT | Performed by: HOSPITALIST

## 2017-09-26 PROCEDURE — 4040F PNEUMOC VAC/ADMIN/RCVD: CPT | Performed by: HOSPITALIST

## 2017-09-26 RX ORDER — IBUPROFEN 400 MG/1
400 TABLET ORAL 2 TIMES DAILY PRN
Qty: 60 TABLET | Refills: 2 | Status: SHIPPED | OUTPATIENT
Start: 2017-09-26 | End: 2018-05-08 | Stop reason: SDUPTHER

## 2017-09-26 ASSESSMENT — PATIENT HEALTH QUESTIONNAIRE - PHQ9
5. POOR APPETITE OR OVEREATING: 0
6. FEELING BAD ABOUT YOURSELF - OR THAT YOU ARE A FAILURE OR HAVE LET YOURSELF OR YOUR FAMILY DOWN: 0
9. THOUGHTS THAT YOU WOULD BE BETTER OFF DEAD, OR OF HURTING YOURSELF: 0
8. MOVING OR SPEAKING SO SLOWLY THAT OTHER PEOPLE COULD HAVE NOTICED. OR THE OPPOSITE, BEING SO FIGETY OR RESTLESS THAT YOU HAVE BEEN MOVING AROUND A LOT MORE THAN USUAL: 0
SUM OF ALL RESPONSES TO PHQ QUESTIONS 1-9: 0
SUM OF ALL RESPONSES TO PHQ9 QUESTIONS 1 & 2: 0
7. TROUBLE CONCENTRATING ON THINGS, SUCH AS READING THE NEWSPAPER OR WATCHING TELEVISION: 0
10. IF YOU CHECKED OFF ANY PROBLEMS, HOW DIFFICULT HAVE THESE PROBLEMS MADE IT FOR YOU TO DO YOUR WORK, TAKE CARE OF THINGS AT HOME, OR GET ALONG WITH OTHER PEOPLE: 0
3. TROUBLE FALLING OR STAYING ASLEEP: 0
2. FEELING DOWN, DEPRESSED OR HOPELESS: 0
1. LITTLE INTEREST OR PLEASURE IN DOING THINGS: 0
4. FEELING TIRED OR HAVING LITTLE ENERGY: 0

## 2017-09-27 ENCOUNTER — TELEPHONE (OUTPATIENT)
Dept: DERMATOLOGY | Age: 82
End: 2017-09-27

## 2017-09-27 DIAGNOSIS — C44.321 SQUAMOUS CELL CARCINOMA OF NOSE: Primary | ICD-10-CM

## 2017-09-27 LAB — DERMATOLOGY PATHOLOGY REPORT: NORMAL

## 2017-10-12 ENCOUNTER — TELEPHONE (OUTPATIENT)
Dept: ORTHOPEDIC SURGERY | Age: 82
End: 2017-10-12

## 2017-10-12 NOTE — TELEPHONE ENCOUNTER
Son called in asking if Dr Daniel Miller will see patient again. Had recent injections with Dr. Eugene Christopher. Per Dr. Agustin Dunn okay to see but will not inject till December.  Tried calling son back phone with and without 1 busy tone

## 2017-10-25 ENCOUNTER — PROCEDURE VISIT (OUTPATIENT)
Dept: DERMATOLOGY | Age: 82
End: 2017-10-25
Payer: MEDICARE

## 2017-10-25 ENCOUNTER — HOSPITAL ENCOUNTER (OUTPATIENT)
Age: 82
Setting detail: SPECIMEN
Discharge: HOME | End: 2017-10-25
Payer: MEDICARE

## 2017-10-25 VITALS — SYSTOLIC BLOOD PRESSURE: 124 MMHG | DIASTOLIC BLOOD PRESSURE: 68 MMHG

## 2017-10-25 DIAGNOSIS — L98.9 PAINFUL SKIN LESION: ICD-10-CM

## 2017-10-25 DIAGNOSIS — D17.39 BENIGN LIPOMATOUS NEOPLASM OF SKIN AND SUBCUTANEOUS TISSUE OF OTHER SITES: Primary | ICD-10-CM

## 2017-10-25 PROCEDURE — 11402 EXC TR-EXT B9+MARG 1.1-2 CM: CPT | Performed by: DERMATOLOGY

## 2017-10-25 PROCEDURE — 12031 INTMD RPR S/A/T/EXT 2.5 CM/<: CPT | Performed by: DERMATOLOGY

## 2017-10-25 RX ORDER — LIDOCAINE HYDROCHLORIDE AND EPINEPHRINE 10; 10 MG/ML; UG/ML
6 INJECTION, SOLUTION INFILTRATION; PERINEURAL ONCE
Status: COMPLETED | OUTPATIENT
Start: 2017-10-25 | End: 2017-10-25

## 2017-10-25 RX ADMIN — LIDOCAINE HYDROCHLORIDE AND EPINEPHRINE 6 ML: 10; 10 INJECTION, SOLUTION INFILTRATION; PERINEURAL at 12:36

## 2017-10-25 NOTE — PATIENT INSTRUCTIONS
BIOPSY WOUND CARE    A biopsy is where a small piece of skin tissue is removed and examined by a pathologist.  When a biopsy is done, there is a small wound site that requires proper care to prevent infection and scarring. Some biopsies require sutures and their removal.    How to Care for Biopsy Wound    A.  Leave band-aid or dressing on for 24 hours. B. Wash two times a day with soap and water. C.  Let the wound air dry, then apply Vaseline ointment and cover with a Band-Aid       unless otherwise instructed by your provider. D. If there is slight discomfort, you may give acetaminophen or ibuprofen. When To Call the Doctor    Call the Dermatology Clinic or your doctor if any of the following occur:    A. Redness and swelling  B. Tenderness and warm to touch  C.  Drainage from wound  D. Fever    Biopsy Results    Biopsy results are usually available in 1-2 weeks. We provide biopsy results in letters for begin results or we will call for any concerning results. If you have not heard from our staff please call the office within 2 weeks. Please call our office with any concerns at [phone]. Topical Chemotherapy    What is topical chemotherapy? Topical chemotherapy is a cream that targets sun-damaged and pre-cancerous cells and destroys them. Name of the prescription:_________Efudex____________________________________  How should it be used? Apply a small amount to affected area(s) __Rt ar,. Forehead, nose and cheeks___twice daily for ______2-4________ weeks. Avoid contact with the eyes. Wash your hands after application. It may be less irritating to use the cream during the winter to avoid sweating and irritation that can occur in the warmer months. Make sure you protect the affected areas from the sun during treatment; staying indoors and wearing sun protective clothing are strongly recommended. What should I expect from treatment?   Treatment often results in a stinging or burning

## 2017-10-25 NOTE — PROGRESS NOTES
Dermatology Procedure Note   [address]  [address]  [facility] [address]  Dept: [phone]  Dept Fax: [phone]      Procedure Date: 10/25/2017  Procedure Time: 12:34 PM    Procedure Practitioner: Chet Simon MD    Procedure: Excision    Pre-Procedure Diagnosis: benign painful lipomatous lesion on the left lower leg (favor lipofibroma)    Post-Procedure Diagnosis: Same as Pre-Procedure Diagnosis    Informed Consent: The procedure and its risks were explained including but not limited to pain, bleeding, infection, permanent scar, permanent pigment alteration and recurrence. Consent to proceed with the procedure was obtained from the patient or the parent by the practitioner    Time Out:  A time out was conducted immediately before starting the procedure that confirmed a final verification of the correct patient, correct procedure, and correct site. Procedure Details:    Excision: The 2 cm lesion on the hibiclens was cleaned with chlorhexidine and anesthetized with 1% lidocaine. The lesion was then excised in an elliptical fashion down to subcutaneous fat. Hemostasis was then achieved with electrocautery. Due to tension on the defect standard undermining was performed to allow for closure. The subcutaneous tissues were then brought together with 4-0 Vicryl. The epidermis was approximated with 4-0 Prolene. running sutures. Final linear closure was 2 cm. Vaseline and a bandage were applied.     Procedure Performed By: Chet Simon MD    Estimated Blood Loss: Minimal    Pathologic Specimen: H&E    Procedure Tolerance: Good    Complication(s): None    Electronically signed by Chet Simon MD on 10/25/17 at 12:34 PM

## 2017-10-30 LAB — DERMATOLOGY PATHOLOGY REPORT: NORMAL

## 2017-11-01 ENCOUNTER — NURSE ONLY (OUTPATIENT)
Dept: DERMATOLOGY | Age: 82
End: 2017-11-01

## 2017-11-02 ENCOUNTER — OFFICE VISIT (OUTPATIENT)
Dept: ORTHOPEDIC SURGERY | Age: 82
End: 2017-11-02
Payer: MEDICARE

## 2017-11-02 VITALS — WEIGHT: 182 LBS | HEIGHT: 62 IN | BODY MASS INDEX: 33.49 KG/M2

## 2017-11-02 DIAGNOSIS — M17.12 PRIMARY OSTEOARTHRITIS OF LEFT KNEE: ICD-10-CM

## 2017-11-02 DIAGNOSIS — M17.11 PRIMARY OSTEOARTHRITIS OF RIGHT KNEE: Primary | ICD-10-CM

## 2017-11-02 PROCEDURE — G8484 FLU IMMUNIZE NO ADMIN: HCPCS | Performed by: ORTHOPAEDIC SURGERY

## 2017-11-02 PROCEDURE — 1123F ACP DISCUSS/DSCN MKR DOCD: CPT | Performed by: ORTHOPAEDIC SURGERY

## 2017-11-02 PROCEDURE — 1090F PRES/ABSN URINE INCON ASSESS: CPT | Performed by: ORTHOPAEDIC SURGERY

## 2017-11-02 PROCEDURE — G8598 ASA/ANTIPLAT THER USED: HCPCS | Performed by: ORTHOPAEDIC SURGERY

## 2017-11-02 PROCEDURE — 1036F TOBACCO NON-USER: CPT | Performed by: ORTHOPAEDIC SURGERY

## 2017-11-02 PROCEDURE — G8428 CUR MEDS NOT DOCUMENT: HCPCS | Performed by: ORTHOPAEDIC SURGERY

## 2017-11-02 PROCEDURE — 99213 OFFICE O/P EST LOW 20 MIN: CPT | Performed by: ORTHOPAEDIC SURGERY

## 2017-11-02 PROCEDURE — 4040F PNEUMOC VAC/ADMIN/RCVD: CPT | Performed by: ORTHOPAEDIC SURGERY

## 2017-11-02 PROCEDURE — G8417 CALC BMI ABV UP PARAM F/U: HCPCS | Performed by: ORTHOPAEDIC SURGERY

## 2017-11-02 ASSESSMENT — ENCOUNTER SYMPTOMS
CONSTIPATION: 0
NAUSEA: 0
DIARRHEA: 0
COUGH: 0

## 2017-11-02 NOTE — PATIENT INSTRUCTIONS
Elder Mensah presented for suture removal on the right leg. The biopsy site was well healed. The suture was removed and a band-aid applied. The patient left in good condition.

## 2017-11-24 DIAGNOSIS — M25.551 RIGHT HIP PAIN: Primary | ICD-10-CM

## 2017-11-29 ENCOUNTER — OFFICE VISIT (OUTPATIENT)
Dept: ORTHOPEDIC SURGERY | Age: 82
End: 2017-11-29
Payer: MEDICARE

## 2017-11-29 VITALS — HEIGHT: 62 IN | BODY MASS INDEX: 33.51 KG/M2 | WEIGHT: 182.1 LBS

## 2017-11-29 DIAGNOSIS — M17.0 PRIMARY OSTEOARTHRITIS OF BOTH KNEES: Primary | ICD-10-CM

## 2017-11-29 DIAGNOSIS — M25.551 RIGHT HIP PAIN: ICD-10-CM

## 2017-11-29 PROCEDURE — 4040F PNEUMOC VAC/ADMIN/RCVD: CPT | Performed by: ORTHOPAEDIC SURGERY

## 2017-11-29 PROCEDURE — G8427 DOCREV CUR MEDS BY ELIG CLIN: HCPCS | Performed by: ORTHOPAEDIC SURGERY

## 2017-11-29 PROCEDURE — G8484 FLU IMMUNIZE NO ADMIN: HCPCS | Performed by: ORTHOPAEDIC SURGERY

## 2017-11-29 PROCEDURE — 99214 OFFICE O/P EST MOD 30 MIN: CPT | Performed by: ORTHOPAEDIC SURGERY

## 2017-11-29 PROCEDURE — G8598 ASA/ANTIPLAT THER USED: HCPCS | Performed by: ORTHOPAEDIC SURGERY

## 2017-11-29 PROCEDURE — 1036F TOBACCO NON-USER: CPT | Performed by: ORTHOPAEDIC SURGERY

## 2017-11-29 PROCEDURE — 1090F PRES/ABSN URINE INCON ASSESS: CPT | Performed by: ORTHOPAEDIC SURGERY

## 2017-11-29 PROCEDURE — G8417 CALC BMI ABV UP PARAM F/U: HCPCS | Performed by: ORTHOPAEDIC SURGERY

## 2017-11-29 PROCEDURE — 20610 DRAIN/INJ JOINT/BURSA W/O US: CPT | Performed by: ORTHOPAEDIC SURGERY

## 2017-11-29 PROCEDURE — 1123F ACP DISCUSS/DSCN MKR DOCD: CPT | Performed by: ORTHOPAEDIC SURGERY

## 2017-11-29 ASSESSMENT — ENCOUNTER SYMPTOMS
NAUSEA: 0
DIARRHEA: 0
COUGH: 0
CONSTIPATION: 0

## 2017-11-29 NOTE — PROGRESS NOTES
lesions, signs of infection. There is tenderness over the medial joint line. There is a mildknee effusion. Range of motion of the Bilateral knee is  full. No instability of the knee is appreciated at 0 and 30° of flexion. There is a negative anterior drawer Lachman's test.  There is increased pain with varus Rosales's testing. No calf tenderness is noted. There is a negative hip log roll and Stinchfield test.  Motor, sensory, vascular examination to the Bilateral lower extremity is intact. Patient has full range of motion of the ankle. Evaluation of the Right  hip reveals no outward deformity. Patient ambulates with minimal shortening weightbearing phase to the  Right hip. Negative hip log roll and Stinchfield test is appreciated on the affected side. Increased pain with palpation of the greater trochanteric region of the Right  hip and posterior to the greater trochanteric region is appreciated. Increased pain with piriformis stretch of the hip is noted. Motor, sensory, vascular examination to the affected lower extremity is grossly intact without focal deficits. Patient has full range of motion of the lumbosacral spine and the knee on the affected side. Neuro: alert. oriented  Eyes: Extra-ocular muscles intact  Mouth: Oral mucosa moist. No perioral lesions  Pulm: Respirations unlabored and regular. Skin: warm, well perfused  Psych:   Patient has good fund of knowledge and displays understanging of exam, diagnosis, and plan. KNEE INJECTION PROCEDURE NOTE:  The patient was identified. The right knee was confirmed with the patient. After a sterile prep with Betadine the knee was injected using a lateral joint line approach with a mixture of 2 mL of 0.25% Marcaine and 80 mg of Depo-Medrol. Patient tolerated the procedure well without post injection complications.   I instructed the patient to call our office immediately if they have any swelling or increased pain at the injection site.    KNEE INJECTION PROCEDURE NOTE:  The patient was identified. The left knee was confirmed with the patient. After a sterile prep with Betadine the knee was injected using a lateral joint line approach with a mixture of 2 mL of 0.25% Marcaine and 80 mg of Depo-Medrol. Patient tolerated the procedure well without post injection complications. I instructed the patient to call our office immediately if they have any swelling or increased pain at the injection site. Assessment:      1. Primary osteoarthritis of both knees    2. Right hip pain       Plan:      Bilateral knee corticosteroid injections were provided today. She tolerated the injections well. As for her hip I believe she has hip bursitis. She would like to treat this conservatively at this time. She will follow up as needed. Follow up:Return if symptoms worsen or fail to improve. Orders Placed This Encounter   Medications    bupivacaine (MARCAINE) 0.25 % injection 5 mg    methylPREDNISolone acetate (DEPO-MEDROL) injection 80 mg    bupivacaine (MARCAINE) 0.25 % injection 5 mg    methylPREDNISolone acetate (DEPO-MEDROL) injection 80 mg          Orders Placed This Encounter   Procedures    (2)  27928 - [place] DRAIN/INJECT LARGE JOINT/BURSA       I, Meghan Hansen MA am scribing for and in the presence of Dr. Vinicio Flowers  12/8/2017 5:42 PM            I have reviewed and made changes accordingly to the work scribed by Meghan Hansen MA. The documentation accurately reflects work and decisions made by me. I have also reviewed documentation completed by clinical staff.     Vinicio Flowers DO, [address] [place] [address]  12/8/2017 5:43 PM      Electronically signed by Armando Hall DO, FAOAO on 12/8/2017 at 5:42 PM

## 2017-11-30 RX ORDER — METHYLPREDNISOLONE ACETATE 80 MG/ML
80 INJECTION, SUSPENSION INTRA-ARTICULAR; INTRALESIONAL; INTRAMUSCULAR; SOFT TISSUE ONCE
Status: COMPLETED | OUTPATIENT
Start: 2017-11-30 | End: 2017-11-30

## 2017-11-30 RX ORDER — BUPIVACAINE HYDROCHLORIDE 2.5 MG/ML
2 INJECTION, SOLUTION INFILTRATION; PERINEURAL ONCE
Status: COMPLETED | OUTPATIENT
Start: 2017-11-30 | End: 2017-11-30

## 2017-11-30 RX ADMIN — BUPIVACAINE HYDROCHLORIDE 5 MG: 2.5 INJECTION, SOLUTION INFILTRATION; PERINEURAL at 09:43

## 2017-11-30 RX ADMIN — METHYLPREDNISOLONE ACETATE 80 MG: 80 INJECTION, SUSPENSION INTRA-ARTICULAR; INTRALESIONAL; INTRAMUSCULAR; SOFT TISSUE at 09:44

## 2017-11-30 RX ADMIN — BUPIVACAINE HYDROCHLORIDE 5 MG: 2.5 INJECTION, SOLUTION INFILTRATION; PERINEURAL at 09:44

## 2017-12-18 ENCOUNTER — OFFICE VISIT (OUTPATIENT)
Dept: DERMATOLOGY | Age: 82
End: 2017-12-18
Payer: MEDICARE

## 2017-12-18 VITALS
SYSTOLIC BLOOD PRESSURE: 113 MMHG | HEIGHT: 62 IN | BODY MASS INDEX: 33.13 KG/M2 | DIASTOLIC BLOOD PRESSURE: 71 MMHG | OXYGEN SATURATION: 98 % | HEART RATE: 64 BPM | WEIGHT: 180 LBS

## 2017-12-18 DIAGNOSIS — L57.0 KERATOSIS, ACTINIC: Primary | ICD-10-CM

## 2017-12-18 PROCEDURE — 1090F PRES/ABSN URINE INCON ASSESS: CPT | Performed by: DERMATOLOGY

## 2017-12-18 PROCEDURE — G8427 DOCREV CUR MEDS BY ELIG CLIN: HCPCS | Performed by: DERMATOLOGY

## 2017-12-18 PROCEDURE — 1036F TOBACCO NON-USER: CPT | Performed by: DERMATOLOGY

## 2017-12-18 PROCEDURE — 99213 OFFICE O/P EST LOW 20 MIN: CPT | Performed by: DERMATOLOGY

## 2017-12-18 PROCEDURE — G8417 CALC BMI ABV UP PARAM F/U: HCPCS | Performed by: DERMATOLOGY

## 2017-12-18 PROCEDURE — 1123F ACP DISCUSS/DSCN MKR DOCD: CPT | Performed by: DERMATOLOGY

## 2017-12-18 PROCEDURE — G8598 ASA/ANTIPLAT THER USED: HCPCS | Performed by: DERMATOLOGY

## 2017-12-18 PROCEDURE — 4040F PNEUMOC VAC/ADMIN/RCVD: CPT | Performed by: DERMATOLOGY

## 2017-12-18 PROCEDURE — G8484 FLU IMMUNIZE NO ADMIN: HCPCS | Performed by: DERMATOLOGY

## 2017-12-19 ENCOUNTER — OFFICE VISIT (OUTPATIENT)
Dept: INTERNAL MEDICINE | Age: 82
End: 2017-12-19
Payer: MEDICARE

## 2017-12-19 VITALS
WEIGHT: 176 LBS | HEART RATE: 69 BPM | BODY MASS INDEX: 32.39 KG/M2 | HEIGHT: 62 IN | DIASTOLIC BLOOD PRESSURE: 73 MMHG | SYSTOLIC BLOOD PRESSURE: 126 MMHG

## 2017-12-19 DIAGNOSIS — G43.009 MIGRAINE WITHOUT AURA AND WITHOUT STATUS MIGRAINOSUS, NOT INTRACTABLE: ICD-10-CM

## 2017-12-19 DIAGNOSIS — I67.82 SUBCORTICAL MICROVASCULAR ISCHEMIC OCCLUSIVE DISEASE: ICD-10-CM

## 2017-12-19 DIAGNOSIS — M17.0 PRIMARY OSTEOARTHRITIS OF BOTH KNEES: ICD-10-CM

## 2017-12-19 DIAGNOSIS — Z23 NEED FOR PROPHYLACTIC VACCINATION AND INOCULATION AGAINST VARICELLA: Primary | ICD-10-CM

## 2017-12-19 DIAGNOSIS — L57.0 ACTINIC KERATOSIS: ICD-10-CM

## 2017-12-19 DIAGNOSIS — C44.92 CANCER, SKIN, SQUAMOUS CELL: ICD-10-CM

## 2017-12-19 PROCEDURE — G8427 DOCREV CUR MEDS BY ELIG CLIN: HCPCS | Performed by: HOSPITALIST

## 2017-12-19 PROCEDURE — G8417 CALC BMI ABV UP PARAM F/U: HCPCS | Performed by: HOSPITALIST

## 2017-12-19 PROCEDURE — 1123F ACP DISCUSS/DSCN MKR DOCD: CPT | Performed by: HOSPITALIST

## 2017-12-19 PROCEDURE — G8484 FLU IMMUNIZE NO ADMIN: HCPCS | Performed by: HOSPITALIST

## 2017-12-19 PROCEDURE — 1036F TOBACCO NON-USER: CPT | Performed by: HOSPITALIST

## 2017-12-19 PROCEDURE — 1090F PRES/ABSN URINE INCON ASSESS: CPT | Performed by: HOSPITALIST

## 2017-12-19 PROCEDURE — G8598 ASA/ANTIPLAT THER USED: HCPCS | Performed by: HOSPITALIST

## 2017-12-19 PROCEDURE — 4040F PNEUMOC VAC/ADMIN/RCVD: CPT | Performed by: HOSPITALIST

## 2017-12-19 PROCEDURE — 99213 OFFICE O/P EST LOW 20 MIN: CPT

## 2017-12-19 PROCEDURE — 99213 OFFICE O/P EST LOW 20 MIN: CPT | Performed by: HOSPITALIST

## 2017-12-19 NOTE — PROGRESS NOTES
[facility]  [facility] [address]  [address] [facility] [address] [facility]  Dept: [phone]  Dept Fax: [phone]    Office Progress/Follow Up Note  Date of patient's visit: 12/19/2017  Patient's Name:  Alec Escalante YOB: 1930            Patient Care Team:  Anat Ohara MD as PCP - General (Internal Medicine)  Cynthia Núñez MD as PCP - S Attributed Provider  Cynthia Núñez MD as Referring Physician (Internal Medicine)    REASON FOR VISIT: Routine outpatient follow up    HISTORY OF PRESENT ILLNESS:      Chief Complaint   Patient presents with    Hypertension     3 month follow up       History was obtained from: patient. Alec Escalante is a 80 y.o. is here for a follow up. Patient follows with Dr. Luis Quevedo for knee osteoarthritis has been getting steroid injections. She had skin lesions on her face and arm. Her skin lesion was found to be invasive squamous cell carcinoma and she had MOHs procedure for that. She has been receiving efudex cream for actinic keratosis. She has no additionally issues.        Patient Active Problem List   Diagnosis    Dizziness    Essential hypertension    Subcortical microvascular ischemic occlusive disease    Pure hypercholesterolemia    History of myasthenia gravis    Personal history of systemic lupus erythematosus (SLE)    Migraine without aura and without status migrainosus, not intractable    Primary osteoarthritis of knee    Cancer, skin, squamous cell    Actinic keratosis         Health Maintenance Due   Topic Date Due    Zostavax vaccine  08/16/1990         Allergies   Allergen Reactions    Seasonal      Certain plants    Shellfish-Derived Products Hives         MEDICATIONS:      Current Outpatient Prescriptions   Medication Sig Dispense Refill    aspirin 81 MG tablet Take 1 tablet by mouth daily 30 tablet 3    ibuprofen (ADVIL;MOTRIN) 400 MG tablet Take 1 tablet by mouth 2 times daily as needed for Pain 60 tablet 2    fluorouracil (EFUDEX) 5 % cream Apply twice daily to actinic keratosis for 2-4 weeks stopping when red and irritated 40 g 0    meclizine (ANTIVERT) 25 MG tablet Take 1 tablet by mouth 3 times daily  0    nitroGLYCERIN (NITROSTAT) 0.4 MG SL tablet Place 1 tablet under the tongue every 5 minutes as needed for Chest pain. 25 tablet 3     No current facility-administered medications for this visit. SOCIAL HISTORY    Reviewed and no change from previous record. Rogelio Gotti  reports that she has never smoked. She has never used smokeless tobacco.    FAMILY HISTORY:    Reviewed and No change from previous visit    REVIEW OF SYSTEMS:    CONSTITUTIONAL: Denies: fever, chills  PSYCH: Denies: anxiety, depression  ALLERGIES: Denies: urticaria  EYES: Denies: blurry vision, decreased vision, photophobia  ENT: Denies: sore throat, nasal congestion  CARDIOVASCULAR: Denies: chest pain, dyspnea on exertion  RESPIRATORY: Denies: cough, hemoptysis, shortness of breath  GI: Denies: Denies: abdominal pain, flank pain  : Denies: Denies: dysuria, frequency/urgency  NEURO: Denies: dizzy/vertigo, headache  MUSCULOSKELETAL: Denies: back pain, joint pain  SKIN: Denies: rash, itching    PHYSICAL EXAM:      Vitals:    12/19/17 1336   BP: 126/73   Site: Left Arm   Position: Sitting   Cuff Size: Large Adult   Pulse: 69   Weight: 176 lb (79.8 kg)   Height: 5' 2\" (1.575 m)     BP Readings from Last 3 Encounters:   12/19/17 126/73   12/18/17 113/71   10/25/17 124/68      General appearance - alert, well appearing, and in no distress  Chest - clear to auscultation, no wheezes, rales or rhonchi, symmetric air entry  Heart - normal rate, regular rhythm, normal S1, S2, no murmurs, rubs, clicks or gallops  Extremities - peripheral pulses normal, no pedal edema, no clubbing or cyanosis  Skin - erythematous patch on her right arm, with macules over nose and right cheek.      LABORATORY FINDINGS:    CBC:  Lab Results

## 2017-12-19 NOTE — PATIENT INSTRUCTIONS
Follow-up appointment scheduled for 4/10/18, AVS given to patient.     Printed script for Zostavax signed and given to pt        LJ

## 2017-12-19 NOTE — PROGRESS NOTES
Attending Physician Statement  I have discussed the care of Angel Pascual, including pertinent history and exam findings with the resident. I have reviewed the key elements of all parts of the encounter with the resident. I agree with the assessment, and status of the problem list as documented. 1. Need for prophylactic vaccination and inoculation against varicella  zoster vaccine live, PF, (ZOSTAVAX) 73203 UNT/0.65ML injection   2. Subcortical microvascular ischemic occlusive disease  aspirin 81 MG tablet   3. Primary osteoarthritis of both knees     4. Migraine without aura and without status migrainosus, not intractable     5. Cancer, skin, squamous cell     6. Actinic keratosis        The plan and orders should include No orders of the defined types were placed in this encounter. and this was also documented by the resident. The medication list was reviewed with the resident and is up to date. The return visit should be in 3 months .     Valeria Perrin MD, [address] [place] [address] Internal Medicine Associate & [facility]  Associate  Department of Internal Medicine  Internal Medicine Clerkship - Beryle Primes  12/19/2017, 2:42 PM

## 2017-12-19 NOTE — PROGRESS NOTES
Chronic Disease Visit Information    BP Readings from Last 3 Encounters:   12/18/17 113/71   10/25/17 124/68   09/26/17 (!) 146/77          LDL Cholesterol (mg/dL)   Date Value   06/08/2017 74     HDL (mg/dL)   Date Value   06/08/2017 85     BUN (mg/dL)   Date Value   06/27/2017 18     CREATININE (mg/dL)   Date Value   06/27/2017 0.70     Glucose (mg/dL)   Date Value   06/27/2017 97            Have you changed or started any medications since your last visit including any over-the-counter medicines, vitamins, or herbal medicines? no   Are you having any side effects from any of your medications? -  no  Have you stopped taking any of your medications? Is so, why? -  no    Have you seen any other physician or provider since your last visit? No  Have you had any other diagnostic tests since your last visit? No  Have you been seen in the emergency room and/or had an admission to a hospital since we last saw you? No  Have you had your annual diabetic retinal (eye) exam? No  Have you had your routine dental cleaning in the past 6 months? no    Have you activated your [employer] account? If not, what are your barriers?  No: declined     Patient Care Team:  Kishor King MD as PCP - General (Internal Medicine)  Susannah Salas MD as PCP - S Attributed Provider  Susannah Salas MD as Referring Physician (Internal Medicine)         Medical History Review  Past Medical, Family, and Social History reviewed and does not contribute to the patient presenting condition    Health Maintenance   Topic Date Due    Zostavax vaccine  08/16/1990    Flu vaccine (1) 12/26/2017 (Originally 9/1/2017)    DTaP/Tdap/Td vaccine (1 - Tdap) 09/01/2026 (Originally 8/16/1949)    Pneumococcal low/med risk  Completed

## 2018-02-19 ENCOUNTER — OFFICE VISIT (OUTPATIENT)
Dept: DERMATOLOGY | Age: 83
End: 2018-02-19
Payer: MEDICARE

## 2018-02-19 VITALS
OXYGEN SATURATION: 97 % | HEIGHT: 62 IN | BODY MASS INDEX: 33.42 KG/M2 | SYSTOLIC BLOOD PRESSURE: 144 MMHG | RESPIRATION RATE: 16 BRPM | WEIGHT: 181.6 LBS | DIASTOLIC BLOOD PRESSURE: 75 MMHG | HEART RATE: 58 BPM

## 2018-02-19 DIAGNOSIS — L57.0 KERATOSIS, ACTINIC: Primary | ICD-10-CM

## 2018-02-19 DIAGNOSIS — L82.1 SEBORRHEIC KERATOSES: ICD-10-CM

## 2018-02-19 PROCEDURE — G8484 FLU IMMUNIZE NO ADMIN: HCPCS | Performed by: DERMATOLOGY

## 2018-02-19 PROCEDURE — G8427 DOCREV CUR MEDS BY ELIG CLIN: HCPCS | Performed by: DERMATOLOGY

## 2018-02-19 PROCEDURE — 1123F ACP DISCUSS/DSCN MKR DOCD: CPT | Performed by: DERMATOLOGY

## 2018-02-19 PROCEDURE — G8417 CALC BMI ABV UP PARAM F/U: HCPCS | Performed by: DERMATOLOGY

## 2018-02-19 PROCEDURE — 1090F PRES/ABSN URINE INCON ASSESS: CPT | Performed by: DERMATOLOGY

## 2018-02-19 PROCEDURE — 17000 DESTRUCT PREMALG LESION: CPT | Performed by: DERMATOLOGY

## 2018-02-19 PROCEDURE — 4040F PNEUMOC VAC/ADMIN/RCVD: CPT | Performed by: DERMATOLOGY

## 2018-02-19 PROCEDURE — 1036F TOBACCO NON-USER: CPT | Performed by: DERMATOLOGY

## 2018-02-19 PROCEDURE — 17003 DESTRUCT PREMALG LES 2-14: CPT | Performed by: DERMATOLOGY

## 2018-02-19 PROCEDURE — 99213 OFFICE O/P EST LOW 20 MIN: CPT | Performed by: DERMATOLOGY

## 2018-02-19 NOTE — PROGRESS NOTES
REVIEW OF SYSTEMS:  Review of Systems   Constitutional: Negative. Skin: Denies any new changing, growing or bleeding lesions or rashes except as described in the HPI     PHYSICAL EXAM:   BP (!) 144/75 (Site: Left Arm, Position: Sitting, Cuff Size: Large Adult)   Pulse 58   Resp 16   Ht 5' 2\" (1.575 m)   Wt 181 lb 9.6 oz (82.4 kg)   SpO2 97%   BMI 33.22 kg/m²     General Exam:  General Appearance: No acute distress, Well nourished     Neuro: Alert  Psych: Not Performed   Lymph Node: Not performed    Cutaneous Exam: Performed as documented in clinic note below. Sun-exposed skin, which includes the head/face, neck, both arms, digits and/or nails was examined. Pertinent Physical Exam Findings:  Physical Exam   Skin:            Medical Necessity of Exam Performed:   Distribution of patient concerns    Additional Diagnostic Testing performed during exam: Not performed ,  Not performed    ASSESSMENT:  1. Keratosis, actinic  MI DESTRUC PREMALIGNANT, FIRST LESION    MI [place] PREMALIGNANT,2-14 LESIONS   2. Seborrheic keratoses         Plan of Action is as Follows:  Assessment 1. Keratosis, actinic  Mostly resolved will treat residual with cryotherapy. Discussed that if new spots appear after visit ok to use efudex focally to treat them  Cryotherapy: 5 total Actinic Keratosis on the face were treated once with liquid nitrogen to achieve a 2-3 mm freeze border.  - [place] DESTRUC PREMALIGNANT, FIRST LESION  - [place] DESTRUC PREMALIGNANT,2-14 LESIONS    2. Seborrheic keratoses  Reassurance - handout provided          Patient Instructions   Cryotherapy    Liquid Nitrogen - \"freeze\" (Cryotherapy)  Your doctor has treated your skin lesions with a very cold substance. The liquid nitrogen is so cold that it may feel like the skin is burning during application. A clear blister or blood blister may form after treatment and may later form a scab. Leave the area alone. Usually this scab will fall of within 1-2 weeks. The area should be kept clean and can be covered with Vaseline or an antibiotic ointment (such as polysporin) and a Band-Aid if needed. If a large blister develops it is ok to use a clean needle to gently pop the blister. Please call our office with any concerns at [phone]. Photo surveillance performed: No    Follow-up: 1 year, sooner PRN    This note was created with the assistance of a speech-recognition program.  Although the intention is to generate a document that actually reflects the content of the visit, no guarantees can be provided that every mistake has been identified and corrected by editing.     Electronically signed by Nigel Webber MD on 2/19/18 at 2:34 PM

## 2018-02-19 NOTE — PATIENT INSTRUCTIONS
Cryotherapy    Liquid Nitrogen - \"freeze\" (Cryotherapy)  Your doctor has treated your skin lesions with a very cold substance. The liquid nitrogen is so cold that it may feel like the skin is burning during application. A clear blister or blood blister may form after treatment and may later form a scab. Leave the area alone. Usually this scab will fall of within 1-2 weeks. The area should be kept clean and can be covered with Vaseline or an antibiotic ointment (such as polysporin) and a Band-Aid if needed. If a large blister develops it is ok to use a clean needle to gently pop the blister. Please call our office with any concerns at [phone].

## 2018-03-01 ENCOUNTER — OFFICE VISIT (OUTPATIENT)
Dept: ORTHOPEDIC SURGERY | Age: 83
End: 2018-03-01
Payer: MEDICARE

## 2018-03-01 VITALS
HEART RATE: 89 BPM | WEIGHT: 178 LBS | HEIGHT: 62 IN | BODY MASS INDEX: 32.76 KG/M2 | DIASTOLIC BLOOD PRESSURE: 92 MMHG | SYSTOLIC BLOOD PRESSURE: 149 MMHG

## 2018-03-01 DIAGNOSIS — M17.0 PRIMARY OSTEOARTHRITIS OF BOTH KNEES: Primary | ICD-10-CM

## 2018-03-01 PROCEDURE — 1090F PRES/ABSN URINE INCON ASSESS: CPT | Performed by: ORTHOPAEDIC SURGERY

## 2018-03-01 PROCEDURE — G8427 DOCREV CUR MEDS BY ELIG CLIN: HCPCS | Performed by: ORTHOPAEDIC SURGERY

## 2018-03-01 PROCEDURE — 99213 OFFICE O/P EST LOW 20 MIN: CPT | Performed by: ORTHOPAEDIC SURGERY

## 2018-03-01 PROCEDURE — G8484 FLU IMMUNIZE NO ADMIN: HCPCS | Performed by: ORTHOPAEDIC SURGERY

## 2018-03-01 PROCEDURE — G8417 CALC BMI ABV UP PARAM F/U: HCPCS | Performed by: ORTHOPAEDIC SURGERY

## 2018-03-01 PROCEDURE — 1036F TOBACCO NON-USER: CPT | Performed by: ORTHOPAEDIC SURGERY

## 2018-03-01 PROCEDURE — 20610 DRAIN/INJ JOINT/BURSA W/O US: CPT | Performed by: ORTHOPAEDIC SURGERY

## 2018-03-01 PROCEDURE — 4040F PNEUMOC VAC/ADMIN/RCVD: CPT | Performed by: ORTHOPAEDIC SURGERY

## 2018-03-01 PROCEDURE — 1123F ACP DISCUSS/DSCN MKR DOCD: CPT | Performed by: ORTHOPAEDIC SURGERY

## 2018-03-01 RX ORDER — METHYLPREDNISOLONE ACETATE 80 MG/ML
80 INJECTION, SUSPENSION INTRA-ARTICULAR; INTRALESIONAL; INTRAMUSCULAR; SOFT TISSUE ONCE
Status: COMPLETED | OUTPATIENT
Start: 2018-03-01 | End: 2018-03-02

## 2018-03-01 RX ORDER — BUPIVACAINE HYDROCHLORIDE 2.5 MG/ML
2 INJECTION, SOLUTION INFILTRATION; PERINEURAL ONCE
Status: COMPLETED | OUTPATIENT
Start: 2018-03-01 | End: 2018-03-02

## 2018-03-01 ASSESSMENT — ENCOUNTER SYMPTOMS
CONSTIPATION: 0
COUGH: 0
NAUSEA: 0
DIARRHEA: 0

## 2018-03-01 NOTE — PROGRESS NOTES
[address] [place] [address]  Dept: [phone]  Dept Fax: [phone]        Ambulatory Follow Up      Subjective:   Tayo Burciaga is a 80y.o. year old female who presents to our office today for routine followup regarding her   1. Primary osteoarthritis of both knees    . Chief Complaint   Patient presents with    Knee Pain     bilateral        HPI     aTyo Burciaga  is a 80 y.o.  female who presents today in follow for her bilateral knee. The patient was last seen on 11/29/2017 and underwent treatment in the form of corticosteroid injection. The patient notes improvement with the previous treatment but it has since subsided. She would like to repeat the injections today          Review of Systems   Constitutional: Negative for chills and fever. Respiratory: Negative for cough. Gastrointestinal: Negative for constipation, diarrhea and nausea. Musculoskeletal: Negative for arthralgias, gait problem, joint swelling and myalgias. Neurological: Negative for dizziness, weakness and numbness. I have reviewed the CC, HPI, ROS, PMH, FHX, Social History. I agree with the documentation provided by other staff, residents and have reviewed their documentation prior to providing my signature indicating agreement. Objective :   BP (!) 149/92   Pulse 89   Ht 5' 2.01\" (1.575 m)   Wt 178 lb (80.7 kg)   BMI 32.55 kg/m²  Body mass index is 32.55 kg/m². General: Tayo Burciaga is a 80 y.o. female who is alert and oriented and sitting comfortably in our office. Ortho Exam  MS:  Evaluation of the Bilateral knee reveals no significant outward deformity. There is no erythema, warmth, skin lesions, signs of infection. There is tenderness over the medial joint line. There is a mildknee effusion. Range of motion of the Bilateral knee mild extensor lag. No instability of the knee is appreciated at 0 and 30° of flexion.   There

## 2018-03-02 RX ADMIN — BUPIVACAINE HYDROCHLORIDE 5 MG: 2.5 INJECTION, SOLUTION INFILTRATION; PERINEURAL at 13:53

## 2018-03-02 RX ADMIN — METHYLPREDNISOLONE ACETATE 80 MG: 80 INJECTION, SUSPENSION INTRA-ARTICULAR; INTRALESIONAL; INTRAMUSCULAR; SOFT TISSUE at 13:54

## 2018-05-08 ENCOUNTER — OFFICE VISIT (OUTPATIENT)
Dept: INTERNAL MEDICINE | Age: 83
End: 2018-05-08
Payer: MEDICARE

## 2018-05-08 VITALS
HEART RATE: 90 BPM | SYSTOLIC BLOOD PRESSURE: 127 MMHG | WEIGHT: 179 LBS | RESPIRATION RATE: 98 BRPM | BODY MASS INDEX: 32.73 KG/M2 | DIASTOLIC BLOOD PRESSURE: 71 MMHG

## 2018-05-08 DIAGNOSIS — R42 DIZZINESS: Chronic | ICD-10-CM

## 2018-05-08 DIAGNOSIS — L57.0 ACTINIC KERATOSIS: ICD-10-CM

## 2018-05-08 DIAGNOSIS — M17.0 PRIMARY OSTEOARTHRITIS OF BOTH KNEES: ICD-10-CM

## 2018-05-08 DIAGNOSIS — I10 ESSENTIAL HYPERTENSION: Primary | ICD-10-CM

## 2018-05-08 DIAGNOSIS — G43.009 MIGRAINE WITHOUT AURA AND WITHOUT STATUS MIGRAINOSUS, NOT INTRACTABLE: ICD-10-CM

## 2018-05-08 DIAGNOSIS — C44.92 CANCER, SKIN, SQUAMOUS CELL: ICD-10-CM

## 2018-05-08 DIAGNOSIS — I67.82 SUBCORTICAL MICROVASCULAR ISCHEMIC OCCLUSIVE DISEASE: ICD-10-CM

## 2018-05-08 PROCEDURE — 99213 OFFICE O/P EST LOW 20 MIN: CPT | Performed by: INTERNAL MEDICINE

## 2018-05-08 PROCEDURE — G8427 DOCREV CUR MEDS BY ELIG CLIN: HCPCS | Performed by: HOSPITALIST

## 2018-05-08 PROCEDURE — 1036F TOBACCO NON-USER: CPT | Performed by: HOSPITALIST

## 2018-05-08 PROCEDURE — 1090F PRES/ABSN URINE INCON ASSESS: CPT | Performed by: HOSPITALIST

## 2018-05-08 PROCEDURE — 1123F ACP DISCUSS/DSCN MKR DOCD: CPT | Performed by: HOSPITALIST

## 2018-05-08 PROCEDURE — G8417 CALC BMI ABV UP PARAM F/U: HCPCS | Performed by: HOSPITALIST

## 2018-05-08 PROCEDURE — 99213 OFFICE O/P EST LOW 20 MIN: CPT | Performed by: HOSPITALIST

## 2018-05-08 PROCEDURE — 4040F PNEUMOC VAC/ADMIN/RCVD: CPT | Performed by: HOSPITALIST

## 2018-05-08 RX ORDER — MECLIZINE HYDROCHLORIDE 25 MG/1
25 TABLET ORAL 3 TIMES DAILY PRN
Qty: 20 TABLET | Refills: 0 | Status: SHIPPED | OUTPATIENT
Start: 2018-05-08 | End: 2019-01-16 | Stop reason: SDUPTHER

## 2018-05-08 RX ORDER — IBUPROFEN 400 MG/1
400 TABLET ORAL 2 TIMES DAILY PRN
Qty: 60 TABLET | Refills: 2 | Status: SHIPPED | OUTPATIENT
Start: 2018-05-08 | End: 2018-08-15 | Stop reason: SDUPTHER

## 2018-06-28 ENCOUNTER — OFFICE VISIT (OUTPATIENT)
Dept: ORTHOPEDIC SURGERY | Age: 83
End: 2018-06-28
Payer: MEDICARE

## 2018-06-28 VITALS — BODY MASS INDEX: 32.39 KG/M2 | HEIGHT: 62 IN | WEIGHT: 176 LBS

## 2018-06-28 DIAGNOSIS — M17.11 PRIMARY OSTEOARTHRITIS OF RIGHT KNEE: Primary | ICD-10-CM

## 2018-06-28 DIAGNOSIS — M17.12 PRIMARY OSTEOARTHRITIS OF LEFT KNEE: ICD-10-CM

## 2018-06-28 PROCEDURE — 1036F TOBACCO NON-USER: CPT | Performed by: ORTHOPAEDIC SURGERY

## 2018-06-28 PROCEDURE — 20610 DRAIN/INJ JOINT/BURSA W/O US: CPT | Performed by: ORTHOPAEDIC SURGERY

## 2018-06-28 PROCEDURE — 4040F PNEUMOC VAC/ADMIN/RCVD: CPT | Performed by: ORTHOPAEDIC SURGERY

## 2018-06-28 PROCEDURE — G8427 DOCREV CUR MEDS BY ELIG CLIN: HCPCS | Performed by: ORTHOPAEDIC SURGERY

## 2018-06-28 PROCEDURE — G8417 CALC BMI ABV UP PARAM F/U: HCPCS | Performed by: ORTHOPAEDIC SURGERY

## 2018-06-28 PROCEDURE — 1090F PRES/ABSN URINE INCON ASSESS: CPT | Performed by: ORTHOPAEDIC SURGERY

## 2018-06-28 PROCEDURE — 1123F ACP DISCUSS/DSCN MKR DOCD: CPT | Performed by: ORTHOPAEDIC SURGERY

## 2018-06-28 RX ORDER — BUPIVACAINE HYDROCHLORIDE 2.5 MG/ML
2 INJECTION, SOLUTION INFILTRATION; PERINEURAL ONCE
Status: COMPLETED | OUTPATIENT
Start: 2018-06-28 | End: 2018-06-29

## 2018-06-28 RX ORDER — METHYLPREDNISOLONE ACETATE 80 MG/ML
80 INJECTION, SUSPENSION INTRA-ARTICULAR; INTRALESIONAL; INTRAMUSCULAR; SOFT TISSUE ONCE
Status: COMPLETED | OUTPATIENT
Start: 2018-06-28 | End: 2018-06-29

## 2018-06-28 ASSESSMENT — ENCOUNTER SYMPTOMS
SHORTNESS OF BREATH: 0
WHEEZING: 0
BACK PAIN: 0

## 2018-06-28 NOTE — PROGRESS NOTES
[address] [place] [address]  Dept: [phone]  Dept Fax: [phone]        Ambulatory Follow Up      Subjective:   Dinorah Leach is a 80y.o. year old female who presents to our office today for routine followup regarding her   1. Primary osteoarthritis of right knee    2. Primary osteoarthritis of left knee    . Chief Complaint   Patient presents with    Knee Pain     bilateral knee pain       HPI Dinorah Leach presents to the office today for follow-up after corticosteroid  injection of the bilateral knees on 3/1/18. The patient notes improvement with the previous injections. It has been 4 months and she would like to have more injections in the bilateral knees. Patient states she thinks she is very strong for her age and is not interested in physical therapy at this time. Review of Systems   Constitutional: Negative for chills, diaphoresis and fever. Respiratory: Negative for shortness of breath and wheezing. Cardiovascular: Negative for chest pain, palpitations and leg swelling. Musculoskeletal: Positive for arthralgias (bilateral knee). Negative for back pain, gait problem, joint swelling, myalgias, neck pain and neck stiffness. Neurological: Negative for weakness and numbness. I have reviewed the CC, HPI, ROS, PMH, FHX, Social History. I agree with the documentation provided by other staff, residents, and/or medical students and have reviewed their documentation prior to providing my signature indicating agreement. Objective :   General: Dinorah Leach is a 80 y.o. female who is alert and oriented and sitting comfortably in our office. Ortho Exam  MS:  Evaluation of the Bilateral knee reveals no significant outward deformity. There is no erythema, warmth, skin lesions, signs of infection. There is tenderness over the medial joint line. There is a mildknee effusion.   Range of motion of the Bilateral activity modification, physical therapy and/or surgical intervention. The patient would like to proceed with bilateral knee corticosteroid injections. Thr patient handled the injections bilaterally to the knees well. The patient will follow up in 4 months. We discussed that the patient should call us with any concerns or questions. Follow up:Return in about 4 months (around 10/28/2018). CHU Conn am scribing for and in the presence of Dr. Tana Molina. 7/6/2018 6:00 PM    I have reviewed and made changes accordingly to the work scribed by Vane Huang, [address] [place] [id number]. The documentation accurately reflects work and decisions made by me. I have also reviewed documentation completed by clinical staff.     This note is created with the assistance of a speech recognition program.  While intending to generate a document that actually reflects the content of the visit, the document can still have some errors including those of syntax and sound a like substitutions which may escape proof reading.  It such instances, actual meaning can be extrapolated by contextual diversion      Tana Molina DO, [address] [place] [address]  7/6/2018 6:00 PM        Orders Placed This Encounter   Medications    bupivacaine (MARCAINE) 0.25 % injection 5 mg    bupivacaine (MARCAINE) 0.25 % injection 5 mg    methylPREDNISolone acetate (DEPO-MEDROL) injection 80 mg    methylPREDNISolone acetate (DEPO-MEDROL) injection 80 mg          Orders Placed This Encounter   Procedures    91216 - [place] DRAIN/INJECT LARGE JOINT/BURSA       Electronically signed by Gabe Fischer DO, FAOAO on 7/6/2018 at 6:00 PM

## 2018-06-29 RX ADMIN — METHYLPREDNISOLONE ACETATE 80 MG: 80 INJECTION, SUSPENSION INTRA-ARTICULAR; INTRALESIONAL; INTRAMUSCULAR; SOFT TISSUE at 14:49

## 2018-06-29 RX ADMIN — BUPIVACAINE HYDROCHLORIDE 5 MG: 2.5 INJECTION, SOLUTION INFILTRATION; PERINEURAL at 14:47

## 2018-06-29 RX ADMIN — BUPIVACAINE HYDROCHLORIDE 5 MG: 2.5 INJECTION, SOLUTION INFILTRATION; PERINEURAL at 14:48

## 2018-08-15 ENCOUNTER — OFFICE VISIT (OUTPATIENT)
Dept: INTERNAL MEDICINE | Age: 83
End: 2018-08-15
Payer: MEDICARE

## 2018-08-15 VITALS
SYSTOLIC BLOOD PRESSURE: 138 MMHG | WEIGHT: 175 LBS | BODY MASS INDEX: 32.01 KG/M2 | HEART RATE: 63 BPM | DIASTOLIC BLOOD PRESSURE: 75 MMHG

## 2018-08-15 DIAGNOSIS — M17.0 PRIMARY OSTEOARTHRITIS OF BOTH KNEES: ICD-10-CM

## 2018-08-15 DIAGNOSIS — R53.83 FATIGUE, UNSPECIFIED TYPE: Primary | ICD-10-CM

## 2018-08-15 DIAGNOSIS — Z13.220 SCREENING FOR CHOLESTEROL LEVEL: ICD-10-CM

## 2018-08-15 DIAGNOSIS — I67.82 SUBCORTICAL MICROVASCULAR ISCHEMIC OCCLUSIVE DISEASE: ICD-10-CM

## 2018-08-15 DIAGNOSIS — Z13.21 ENCOUNTER FOR VITAMIN DEFICIENCY SCREENING: ICD-10-CM

## 2018-08-15 PROCEDURE — 99213 OFFICE O/P EST LOW 20 MIN: CPT | Performed by: INTERNAL MEDICINE

## 2018-08-15 PROCEDURE — 99213 OFFICE O/P EST LOW 20 MIN: CPT | Performed by: HOSPITALIST

## 2018-08-15 RX ORDER — IBUPROFEN 400 MG/1
400 TABLET ORAL 2 TIMES DAILY PRN
Qty: 60 TABLET | Refills: 2 | Status: SHIPPED | OUTPATIENT
Start: 2018-08-15 | End: 2019-01-16 | Stop reason: SDUPTHER

## 2018-08-15 NOTE — PROGRESS NOTES
HYPERTENSION visit     BP Readings from Last 3 Encounters:   05/08/18 127/71   03/01/18 (!) 149/92   02/19/18 (!) 144/75       LDL Cholesterol (mg/dL)   Date Value   06/08/2017 74     HDL (mg/dL)   Date Value   06/08/2017 85     BUN (mg/dL)   Date Value   06/27/2017 18     CREATININE (mg/dL)   Date Value   06/27/2017 0.70     Glucose (mg/dL)   Date Value   06/27/2017 97              Have you changed or started any medications since your last visit including any over-the-counter medicines, vitamins, or herbal medicines? no   Have you stopped taking any of your medications? Is so, why? -  no  Are you having any side effects from any of your medications? - no  How often do you miss doses of your medication? rare      Have you seen any other physician or provider since your last visit? yes    Have you had any other diagnostic tests since your last visit?  no   Have you been seen in the emergency room and/or had an admission in a hospital since we last saw you?  no   Have you had your routine dental cleaning in the past 6 months?  no     Do you have an active MyChart account? If no, what is the barrier?   No: pt declined    Patient Care Team:  Anabela Van MD as PCP - General (Internal Medicine)  David Rosas MD as PCP - S Attributed Provider  David Rosas MD as Referring Physician (Internal Medicine)    Medical History Review  Past Medical, Family, and Social History reviewed and does contribute to the patient presenting condition    Health Maintenance   Topic Date Due    Shingles Vaccine (1 of 2 - 2 Dose Series) 08/16/1980    Potassium monitoring  06/27/2018    Creatinine monitoring  06/27/2018    DTaP/Tdap/Td vaccine (1 - Tdap) 09/01/2026 (Originally 8/16/1949)    Flu vaccine (1) 09/01/2018    Pneumococcal low/med risk  Completed

## 2018-08-15 NOTE — PATIENT INSTRUCTIONS
LABORATORY INSTRUCTIONS    Your doctor has ordered blood or urine testing. You can get this testing done at the Lab located on the first floor of the [facility], or at any other [facility]. Please stop at Main Registration, before going to the lab, as you must be registered first.     Please get this lab done before your next appointment    You may NOT eat, drink, smoke, or chew anything before this test for 8 hours. You may still have water. Return appointment card and Summary of Care was reviewed and copy was given to the patient.   DEBRA

## 2018-08-15 NOTE — PROGRESS NOTES
[facility]  [facility] [address] [facility]  [address] [facility] [address] [facility]  Dept: [phone]  Dept Fax: [phone]    Office Progress/Follow Up Note  Date of patient's visit: 8/15/2018  Patient's Name:  Audra Fuentes YOB: 1930            Patient Care Team:  Evangelina Pereira MD as PCP - General (Internal Medicine)  Len Velazquez MD as PCP - S Attributed Provider  Len Velazquez MD as Referring Physician (Internal Medicine)    REASON FOR VISIT: Routine outpatient follow up    HISTORY OF PRESENT ILLNESS:      Chief Complaint   Patient presents with    3 Month Follow-Up    Hypertension    COPD    Fatigue     states arms and legs feel heavy -- always tired and wants to just sleep       History was obtained from: patient. Audra Fuentes is a 80 y.o. is here for a follow up. Patient follows with Dr. Deion Montero for knee osteoarthritis has been getting steroid and bupivicaine injections. She had skin lesions on her face and arm. Her skin lesion was found to be invasive squamous cell carcinoma and she had MOHs procedure for that. She has been receiving efudex cream for actinic keratosis. She had cryotherapy for actinic keratosis on her face done by Dr. Seth Cao in February. She was advised to keep an eye for lesions she could use the effudex cream till she was seen by him in the future. Her HTN has been controlled off medication. She is complaining of fatigue which has been happening for last few weeks. She does think it is due to her getting old. She has old h/o myasthenia gravis and hypothyroidism. Her last TSH was normal. Her description of her fatigue does not really fit symptoms of myasthenia. She is not debilitated with her day to day activity.        Patient Active Problem List   Diagnosis    Dizziness    Essential hypertension    Subcortical microvascular ischemic occlusive disease    Pure hypercholesterolemia    History of myasthenia gravis back pain, joint pain  SKIN: Denies: rash, itching    PHYSICAL EXAM:      Vitals:    08/15/18 1114   BP: 138/75   Site: Left Arm   Position: Sitting   Cuff Size: Small Adult   Pulse: 63   Weight: 175 lb (79.4 kg)     BP Readings from Last 3 Encounters:   08/15/18 138/75   05/08/18 127/71   03/01/18 (!) 149/92      General appearance - alert, well appearing, and in no distress  Chest - clear to auscultation, no wheezes, rales or rhonchi, symmetric air entry  Heart - normal rate, regular rhythm, normal S1, S2, no murmurs, rubs, clicks or gallops  Abdomen - soft, nontender, nondistended, no masses or organomegaly  Neurological - alert, oriented, normal speech, no focal findings or movement disorder noted  Musculoskeletal - no joint tenderness, deformity or swelling  Extremities - peripheral pulses normal, no pedal edema, no clubbing or cyanosis    LABORATORY FINDINGS:    CBC:  Lab Results   Component Value Date    WBC 6.9 06/26/2017    HGB 14.1 06/26/2017     06/26/2017       BMP:    Lab Results   Component Value Date     06/27/2017    K 3.9 06/27/2017     06/27/2017    CO2 22 06/27/2017    BUN 18 06/27/2017    CREATININE 0.70 06/27/2017    GLUCOSE 97 06/27/2017       HEMOGLOBIN A1C: No results found for: LABA1C    FASTING LIPID PANEL:  Lab Results   Component Value Date    CHOL 172 06/08/2017    HDL 85 06/08/2017    TRIG 67 06/08/2017       ASSESSMENT AND PLAN:    Analisa León was seen today for 3 month follow-up, hypertension, copd and fatigue. Diagnoses and all orders for this visit:    Fatigue, unspecified type  -     TSH With Reflex Ft4; Future  -     CBC With Auto Differential; Future  -     Vitamin B12 & Folate; Future  -     Comprehensive Metabolic Panel; Future  -     will get work up for mysathenia if fatigue continues. -     She is not incredibly interested in getting treatment for it if not needed. Subcortical microvascular ischemic occlusive disease  -     aspirin 81 MG tablet;  Take 1 tablet by mouth daily    Primary osteoarthritis of both knees  -     ibuprofen (ADVIL;MOTRIN) 400 MG tablet; Take 1 tablet by mouth 2 times daily as needed for Pain    Screening for cholesterol level  -     Lipid Panel; Future    Encounter for vitamin deficiency screening  -     Vitamin D 25 Hydroxy; Future    Other orders  -     Cancel: Basic Metabolic Panel; Future  -     Cancel: [facility]      FOLLOW UP AND INSTRUCTIONS:   Return in about 1 month (around 9/15/2018). · Sofie Ku received counseling on the following healthy behaviors: nutrition, exercise and medication adherence    · Discussed use, benefit, and side effects of prescribed medications. Barriers to medication compliance addressed. All patient questions answered. Pt voiced understanding.      · Patient given educational materials - see patient instructions    Geovanny Capps MD  PGY-3 Internal Medicine Resident

## 2018-08-23 ENCOUNTER — HOSPITAL ENCOUNTER (OUTPATIENT)
Age: 83
Discharge: HOME | End: 2018-08-23
Payer: MEDICARE

## 2018-08-23 DIAGNOSIS — Z13.220 SCREENING FOR CHOLESTEROL LEVEL: ICD-10-CM

## 2018-08-23 DIAGNOSIS — R53.83 FATIGUE, UNSPECIFIED TYPE: ICD-10-CM

## 2018-08-23 LAB
ABSOLUTE EOS #: 0 K/UL (ref 0–0.4)
ABSOLUTE IMMATURE GRANULOCYTE: ABNORMAL K/UL (ref 0–0.3)
ABSOLUTE LYMPH #: 1.5 K/UL (ref 1–4.8)
ABSOLUTE MONO #: 0.6 K/UL (ref 0.2–0.8)
ALBUMIN SERPL-MCNC: 4 G/DL (ref 3.5–5.2)
ALBUMIN/GLOBULIN RATIO: ABNORMAL (ref 1–2.5)
ALP BLD-CCNC: 104 U/L (ref 35–104)
ALT SERPL-CCNC: 46 U/L (ref 5–33)
ANION GAP SERPL CALCULATED.3IONS-SCNC: 13 MMOL/L (ref 9–17)
AST SERPL-CCNC: 26 U/L
BASOPHILS # BLD: 0 % (ref 0–2)
BASOPHILS ABSOLUTE: 0 K/UL (ref 0–0.2)
BILIRUB SERPL-MCNC: 0.59 MG/DL (ref 0.3–1.2)
BUN BLDV-MCNC: 15 MG/DL (ref 8–23)
BUN/CREAT BLD: 23 (ref 9–20)
CALCIUM SERPL-MCNC: 9.6 MG/DL (ref 8.6–10.4)
CHLORIDE BLD-SCNC: 102 MMOL/L (ref 98–107)
CHOLESTEROL/HDL RATIO: 3.1
CHOLESTEROL: 185 MG/DL
CO2: 27 MMOL/L (ref 20–31)
CREAT SERPL-MCNC: 0.65 MG/DL (ref 0.5–0.9)
DIFFERENTIAL TYPE: ABNORMAL
EOSINOPHILS RELATIVE PERCENT: 1 % (ref 1–4)
FOLATE: >20 NG/ML
GFR AFRICAN AMERICAN: >60 ML/MIN
GFR NON-AFRICAN AMERICAN: >60 ML/MIN
GFR SERPL CREATININE-BSD FRML MDRD: ABNORMAL ML/MIN/{1.73_M2}
GFR SERPL CREATININE-BSD FRML MDRD: ABNORMAL ML/MIN/{1.73_M2}
GLUCOSE BLD-MCNC: 82 MG/DL (ref 70–99)
HCT VFR BLD CALC: 42.3 % (ref 36–46)
HDLC SERPL-MCNC: 59 MG/DL
HEMOGLOBIN: 13.8 G/DL (ref 12–16)
IMMATURE GRANULOCYTES: ABNORMAL %
LDL CHOLESTEROL: 107 MG/DL (ref 0–130)
LYMPHOCYTES # BLD: 26 % (ref 24–44)
MCH RBC QN AUTO: 31.3 PG (ref 26–34)
MCHC RBC AUTO-ENTMCNC: 32.5 G/DL (ref 31–37)
MCV RBC AUTO: 96.3 FL (ref 80–100)
MONOCYTES # BLD: 10 % (ref 1–7)
NRBC AUTOMATED: ABNORMAL PER 100 WBC
PDW BLD-RTO: 12.6 % (ref 11.5–14.5)
PLATELET # BLD: 229 K/UL (ref 130–400)
PLATELET ESTIMATE: ABNORMAL
PMV BLD AUTO: 10.3 FL (ref 6–12)
POTASSIUM SERPL-SCNC: 3.7 MMOL/L (ref 3.7–5.3)
RBC # BLD: 4.4 M/UL (ref 4–5.2)
RBC # BLD: ABNORMAL 10*6/UL
SEG NEUTROPHILS: 63 % (ref 36–66)
SEGMENTED NEUTROPHILS ABSOLUTE COUNT: 3.7 K/UL (ref 1.8–7.7)
SODIUM BLD-SCNC: 142 MMOL/L (ref 135–144)
TOTAL PROTEIN: 7.4 G/DL (ref 6.4–8.3)
TRIGL SERPL-MCNC: 93 MG/DL
TSH SERPL DL<=0.05 MIU/L-ACNC: 3.06 MIU/L (ref 0.3–5)
VITAMIN B-12: 998 PG/ML (ref 232–1245)
VLDLC SERPL CALC-MCNC: NORMAL MG/DL (ref 1–30)
WBC # BLD: 5.8 K/UL (ref 3.5–11)
WBC # BLD: ABNORMAL 10*3/UL

## 2018-08-23 PROCEDURE — 85025 COMPLETE CBC W/AUTO DIFF WBC: CPT

## 2018-08-23 PROCEDURE — 82607 VITAMIN B-12: CPT

## 2018-08-23 PROCEDURE — 36415 COLL VENOUS BLD VENIPUNCTURE: CPT

## 2018-08-23 PROCEDURE — 82746 ASSAY OF FOLIC ACID SERUM: CPT

## 2018-08-23 PROCEDURE — 84443 ASSAY THYROID STIM HORMONE: CPT

## 2018-08-23 PROCEDURE — 80061 LIPID PANEL: CPT

## 2018-08-23 PROCEDURE — 80053 COMPREHEN METABOLIC PANEL: CPT

## 2018-09-12 ENCOUNTER — OFFICE VISIT (OUTPATIENT)
Dept: INTERNAL MEDICINE | Age: 83
End: 2018-09-12
Payer: MEDICARE

## 2018-09-12 ENCOUNTER — HOSPITAL ENCOUNTER (OUTPATIENT)
Age: 83
Setting detail: SPECIMEN
Discharge: HOME | End: 2018-09-12
Payer: MEDICARE

## 2018-09-12 VITALS
BODY MASS INDEX: 32.01 KG/M2 | WEIGHT: 175 LBS | SYSTOLIC BLOOD PRESSURE: 140 MMHG | DIASTOLIC BLOOD PRESSURE: 76 MMHG | HEART RATE: 58 BPM

## 2018-09-12 DIAGNOSIS — Z86.69 HISTORY OF MYASTHENIA GRAVIS: Primary | ICD-10-CM

## 2018-09-12 DIAGNOSIS — Z86.69 HISTORY OF MYASTHENIA GRAVIS: ICD-10-CM

## 2018-09-12 DIAGNOSIS — E55.9 VITAMIN D DEFICIENCY: ICD-10-CM

## 2018-09-12 DIAGNOSIS — R53.83 FATIGUE, UNSPECIFIED TYPE: ICD-10-CM

## 2018-09-12 PROCEDURE — 36415 COLL VENOUS BLD VENIPUNCTURE: CPT

## 2018-09-12 PROCEDURE — 99211 OFF/OP EST MAY X REQ PHY/QHP: CPT | Performed by: INTERNAL MEDICINE

## 2018-09-12 PROCEDURE — 82306 VITAMIN D 25 HYDROXY: CPT

## 2018-09-12 PROCEDURE — G8427 DOCREV CUR MEDS BY ELIG CLIN: HCPCS | Performed by: HOSPITALIST

## 2018-09-12 PROCEDURE — G8417 CALC BMI ABV UP PARAM F/U: HCPCS | Performed by: HOSPITALIST

## 2018-09-12 PROCEDURE — 1123F ACP DISCUSS/DSCN MKR DOCD: CPT | Performed by: HOSPITALIST

## 2018-09-12 PROCEDURE — 1036F TOBACCO NON-USER: CPT | Performed by: HOSPITALIST

## 2018-09-12 PROCEDURE — 4040F PNEUMOC VAC/ADMIN/RCVD: CPT | Performed by: HOSPITALIST

## 2018-09-12 PROCEDURE — 1090F PRES/ABSN URINE INCON ASSESS: CPT | Performed by: HOSPITALIST

## 2018-09-12 PROCEDURE — 99213 OFFICE O/P EST LOW 20 MIN: CPT | Performed by: HOSPITALIST

## 2018-09-12 PROCEDURE — 1101F PT FALLS ASSESS-DOCD LE1/YR: CPT | Performed by: HOSPITALIST

## 2018-09-12 PROCEDURE — 83519 RIA NONANTIBODY: CPT

## 2018-09-12 RX ORDER — CAPSAICIN 0.025 %
CREAM (GRAM) TOPICAL
Qty: 1 TUBE | Refills: 1 | Status: SHIPPED | OUTPATIENT
Start: 2018-09-12 | End: 2018-10-12

## 2018-09-12 NOTE — PROGRESS NOTES
since. She usually eats more vegetable on a regular day. Will continue to monitor her symptoms as she is not exhibiting any at the moment. Regarding her fatigue she states she does more tired after exerting herself more so later in the day and she states she can feel she has more trouble swallowing at times. There is no official diagnosis for myasthenia gravis. She was diagnosed per her decades ago and was only treated with steroids which she has not needed in years. She also is concerned about possible recurrence of her actinic keratosis on her right forearm. Will advised her to restart efudex and to follow with Dr. Ashley Mcmahon. Patient Active Problem List   Diagnosis    Dizziness    Essential hypertension    Subcortical microvascular ischemic occlusive disease    Pure hypercholesterolemia    History of myasthenia gravis    Personal history of systemic lupus erythematosus (SLE)    Migraine without aura and without status migrainosus, not intractable    Primary osteoarthritis of knee    Cancer, skin, squamous cell    Actinic keratosis         Health Maintenance Due   Topic Date Due    Shingles Vaccine (1 of 2 - 2 Dose Series) 08/16/1980    Flu vaccine (1) 09/01/2018         Allergies   Allergen Reactions    Seasonal      Certain plants    Shellfish-Derived Products Hives         MEDICATIONS:      Current Outpatient Prescriptions   Medication Sig Dispense Refill    capsaicin (ZOSTRIX) 0.025 % cream Apply topically 2 times daily.  1 Tube 1    aspirin 81 MG tablet Take 1 tablet by mouth daily 30 tablet 3    ibuprofen (ADVIL;MOTRIN) 400 MG tablet Take 1 tablet by mouth 2 times daily as needed for Pain 60 tablet 2    meclizine (ANTIVERT) 25 MG tablet Take 1 tablet by mouth 3 times daily as needed for Dizziness 20 tablet 0    fluorouracil (EFUDEX) 5 % cream Apply twice daily to actinic keratosis for 2-4 weeks stopping when red and irritated 40 g 0    nitroGLYCERIN (NITROSTAT) 0.4 MG SL tablet Place 1 tablet under the tongue every 5 minutes as needed for Chest pain. 25 tablet 3     No current facility-administered medications for this visit. SOCIAL HISTORY    Reviewed and no change from previous record. Goran Sánchez  reports that she has never smoked.  She has never used smokeless tobacco.    FAMILY HISTORY:    Reviewed and No change from previous visit    REVIEW OF SYSTEMS:    CONSTITUTIONAL: Denies: fever, chills  PSYCH: Denies: anxiety, depression  ALLERGIES: Denies: urticaria  EYES: Denies: blurry vision, decreased vision, photophobia  ENT: Denies: sore throat, nasal congestion  CARDIOVASCULAR: Denies: chest pain, dyspnea on exertion  RESPIRATORY: Denies: cough, hemoptysis, shortness of breath  GI: Denies: Denies: abdominal pain, flank pain  : Denies: Denies: dysuria, frequency/urgency  NEURO: Denies: dizzy/vertigo, headache  MUSCULOSKELETAL: Denies: back pain, joint pain  SKIN: Denies: rash, itching    PHYSICAL EXAM:      Vitals:    09/12/18 1047   BP: (!) 140/76   Site: Right Upper Arm   Position: Sitting   Cuff Size: Medium Adult   Pulse: 58   Weight: 175 lb (79.4 kg)     BP Readings from Last 3 Encounters:   09/12/18 (!) 140/76   08/15/18 138/75   05/08/18 127/71      General appearance - alert, well appearing, and in no distress  Chest - clear to auscultation, no wheezes, rales or rhonchi, symmetric air entry  Heart - normal rate, regular rhythm, normal S1, S2, no murmurs, rubs, clicks or gallops  Abdomen - soft, nontender, nondistended, no masses or organomegaly  Neurological - alert, oriented, normal speech, no focal findings or movement disorder noted  Musculoskeletal - mild back thoracic side muscle tenderness, no joint tenderness, deformity or swelling    LABORATORY FINDINGS:    CBC:  Lab Results   Component Value Date    WBC 5.8 08/23/2018    HGB 13.8 08/23/2018     08/23/2018       BMP:    Lab Results   Component Value Date     08/23/2018    K 3.7 08/23/2018     08/23/2018    CO2 27 08/23/2018    BUN 15 08/23/2018    CREATININE 0.65 08/23/2018    GLUCOSE 82 08/23/2018       HEMOGLOBIN A1C: No results found for: LABA1C    FASTING LIPID PANEL:  Lab Results   Component Value Date    CHOL 185 08/23/2018    HDL 59 08/23/2018    TRIG 93 08/23/2018       ASSESSMENT AND PLAN:    Liza Stout was seen today for 1 month follow-up, copd and hypertension. Diagnoses and all orders for this visit:    History of myasthenia gravis  -     Acetylcholine Receptor Ab; Future  -  Advised her if her symptoms persist she can come back sooner. Fatigue, unspecified type  -     Vitamin D 25 Hydroxy; Future    Vitamin D deficiency   -     Vitamin D 25 Hydroxy; Future    Other orders  -     capsaicin (ZOSTRIX) 0.025 % cream; Apply topically 2 times daily. Subcortical microvascular ischemic occlusive disease  -     aspirin 81 MG tablet; Take 1 tablet by mouth daily     Primary osteoarthritis of both knees  -     ibuprofen (ADVIL;MOTRIN) 400 MG tablet; Take 1 tablet by mouth 2 times daily as needed for Pain    Actinic keratosis  Cont efudex  Follows with Dr. Kaye Davidson:   Return in about 3 months (around 12/12/2018). · Liza Stout received counseling on the following healthy behaviors: nutrition and exercise    · Discussed use, benefit, and side effects of prescribed medications. Barriers to medication compliance addressed. All patient questions answered. Pt voiced understanding.      · Patient given educational materials - see patient instructions    Geovanny Moore MD  PGY-3 Internal Medicine Resident

## 2018-09-12 NOTE — PROGRESS NOTES
Visit Information    Have you changed or started any medications since your last visit including any over-the-counter medicines, vitamins, or herbal medicines? no   Have you stopped taking any of your medications? Is so, why? -  no  Are you having any side effects from any of your medications? - no    Have you seen any other physician or provider since your last visit?  no   Have you had any other diagnostic tests since your last visit? yes - 8/23   Have you been seen in the emergency room and/or had an admission in a hospital since we last saw you?  no   Have you had your routine dental cleaning in the past 6 months?  no     Do you have an active MyChart account? If no, what is the barrier?   No pt declined     Patient Care Team:  Sonu Trevizo MD as PCP - General (Internal Medicine)  Maddie Espitia MD as PCP - S Attributed Provider  Marylu Gilbert MD as Referring Physician (Internal Medicine)    Medical History Review  Past Medical, Family, and Social History reviewed and does contribute to the patient presenting condition    Health Maintenance   Topic Date Due    Shingles Vaccine (1 of 2 - 2 Dose Series) 08/16/1980    Flu vaccine (1) 09/01/2018    DTaP/Tdap/Td vaccine (1 - Tdap) 09/01/2026 (Originally 8/16/1949)    Potassium monitoring  08/23/2019    Creatinine monitoring  08/23/2019    Pneumococcal low/med risk  Completed

## 2018-09-13 LAB — VITAMIN D 25-HYDROXY: 13 NG/ML (ref 30–100)

## 2018-09-14 DIAGNOSIS — E55.9 VITAMIN D DEFICIENCY: Primary | ICD-10-CM

## 2018-09-14 RX ORDER — ERGOCALCIFEROL 1.25 MG/1
50000 CAPSULE ORAL WEEKLY
Qty: 10 CAPSULE | Refills: 0 | Status: SHIPPED | OUTPATIENT
Start: 2018-09-14 | End: 2019-04-17

## 2018-09-17 LAB — ACETYLCHOLINE BINDING ANTIBODY: 0 NMOL/L (ref 0–0.4)

## 2018-11-06 DIAGNOSIS — M17.0 PRIMARY OSTEOARTHRITIS OF BOTH KNEES: Primary | ICD-10-CM

## 2018-11-08 ENCOUNTER — OFFICE VISIT (OUTPATIENT)
Dept: ORTHOPEDIC SURGERY | Age: 83
End: 2018-11-08
Payer: MEDICARE

## 2018-11-08 VITALS — BODY MASS INDEX: 32.39 KG/M2 | HEIGHT: 62 IN | WEIGHT: 176 LBS

## 2018-11-08 DIAGNOSIS — M17.0 PRIMARY OSTEOARTHRITIS OF BOTH KNEES: Primary | ICD-10-CM

## 2018-11-08 PROCEDURE — 4040F PNEUMOC VAC/ADMIN/RCVD: CPT | Performed by: ORTHOPAEDIC SURGERY

## 2018-11-08 PROCEDURE — 1036F TOBACCO NON-USER: CPT | Performed by: ORTHOPAEDIC SURGERY

## 2018-11-08 PROCEDURE — G8427 DOCREV CUR MEDS BY ELIG CLIN: HCPCS | Performed by: ORTHOPAEDIC SURGERY

## 2018-11-08 PROCEDURE — 1101F PT FALLS ASSESS-DOCD LE1/YR: CPT | Performed by: ORTHOPAEDIC SURGERY

## 2018-11-08 PROCEDURE — 1123F ACP DISCUSS/DSCN MKR DOCD: CPT | Performed by: ORTHOPAEDIC SURGERY

## 2018-11-08 PROCEDURE — G8417 CALC BMI ABV UP PARAM F/U: HCPCS | Performed by: ORTHOPAEDIC SURGERY

## 2018-11-08 PROCEDURE — G8484 FLU IMMUNIZE NO ADMIN: HCPCS | Performed by: ORTHOPAEDIC SURGERY

## 2018-11-08 PROCEDURE — 20610 DRAIN/INJ JOINT/BURSA W/O US: CPT | Performed by: ORTHOPAEDIC SURGERY

## 2018-11-08 PROCEDURE — 1090F PRES/ABSN URINE INCON ASSESS: CPT | Performed by: ORTHOPAEDIC SURGERY

## 2018-11-08 PROCEDURE — 99213 OFFICE O/P EST LOW 20 MIN: CPT | Performed by: ORTHOPAEDIC SURGERY

## 2018-11-08 RX ORDER — METHYLPREDNISOLONE ACETATE 80 MG/ML
80 INJECTION, SUSPENSION INTRA-ARTICULAR; INTRALESIONAL; INTRAMUSCULAR; SOFT TISSUE ONCE
Status: COMPLETED | OUTPATIENT
Start: 2018-11-08 | End: 2018-11-09

## 2018-11-08 RX ORDER — BUPIVACAINE HYDROCHLORIDE 2.5 MG/ML
2 INJECTION, SOLUTION INFILTRATION; PERINEURAL ONCE
Status: COMPLETED | OUTPATIENT
Start: 2018-11-08 | End: 2018-11-09

## 2018-11-08 ASSESSMENT — ENCOUNTER SYMPTOMS
CONSTIPATION: 0
DIARRHEA: 0
COUGH: 0
NAUSEA: 0

## 2018-11-08 NOTE — PROGRESS NOTES
above.      History:   Left knee pain    Findings:   Standing AP/Lateral/Tunnel/Merchant view xrays of the Left done in the office today shows significant medial joint space narrowing, tricompartmental osteophytosis, joint line sclerosis medially. No evidence of fracture, subluxation, dislocation, radioopaque foreign body/tumor is noted. Lateral subluxation of the tibia is appreciated. Impression:   Left knee severe  degenerative changes as described above. KNEE INJECTION PROCEDURE NOTE:  The patient was identified. The right knee was confirmed with the patient. After a sterile prep with Betadine the knee was injected using a lateral joint line approach with a mixture of 2 mL of 0.25% Marcaine and 80 mg of Depo-Medrol. Patient tolerated the procedure well without post injection complications. I instructed the patient to call our office immediately if they have any swelling or increased pain at the injection site. KNEE INJECTION PROCEDURE NOTE:  The patient was identified. The left knee was confirmed with the patient. After a sterile prep with Betadine the knee was injected using a lateral joint line approach with a mixture of 2 mL of 0.25% Marcaine and 80 mg of Depo-Medrol. Patient tolerated the procedure well without post injection complications. I instructed the patient to call our office immediately if they have any swelling or increased pain at the injection site. Assessment:      1. Primary osteoarthritis of both knees       Plan:      Reviewed today's radiologies with the patient. The patient is not interested in any surgical intervention at this time. We proceed with the bilateral knee corticosteroid. Patient tolerated the injection well without complications. I will see the patient back as needed. Instructed the patient that we could do another injection for 4 months. Patient notes understanding. Follow up:Return if symptoms worsen or fail to improve.     Orders Placed This

## 2018-11-09 RX ADMIN — METHYLPREDNISOLONE ACETATE 80 MG: 80 INJECTION, SUSPENSION INTRA-ARTICULAR; INTRALESIONAL; INTRAMUSCULAR; SOFT TISSUE at 13:51

## 2018-11-09 RX ADMIN — BUPIVACAINE HYDROCHLORIDE 5 MG: 2.5 INJECTION, SOLUTION INFILTRATION; PERINEURAL at 13:50

## 2019-01-16 ENCOUNTER — OFFICE VISIT (OUTPATIENT)
Dept: INTERNAL MEDICINE | Age: 84
End: 2019-01-16
Payer: MEDICARE

## 2019-01-16 VITALS
SYSTOLIC BLOOD PRESSURE: 135 MMHG | DIASTOLIC BLOOD PRESSURE: 79 MMHG | WEIGHT: 177 LBS | HEART RATE: 55 BPM | BODY MASS INDEX: 32.37 KG/M2 | OXYGEN SATURATION: 99 %

## 2019-01-16 DIAGNOSIS — R42 DIZZINESS: Primary | Chronic | ICD-10-CM

## 2019-01-16 DIAGNOSIS — M17.0 PRIMARY OSTEOARTHRITIS OF BOTH KNEES: ICD-10-CM

## 2019-01-16 DIAGNOSIS — E55.9 VITAMIN D DEFICIENCY: ICD-10-CM

## 2019-01-16 DIAGNOSIS — H91.90 HEARING LOSS, UNSPECIFIED HEARING LOSS TYPE, UNSPECIFIED LATERALITY: ICD-10-CM

## 2019-01-16 DIAGNOSIS — I67.82 SUBCORTICAL MICROVASCULAR ISCHEMIC OCCLUSIVE DISEASE: ICD-10-CM

## 2019-01-16 DIAGNOSIS — M79.89 SWELLING OF LOWER EXTREMITY: ICD-10-CM

## 2019-01-16 DIAGNOSIS — Z86.69 HISTORY OF MYASTHENIA GRAVIS: ICD-10-CM

## 2019-01-16 PROCEDURE — G8417 CALC BMI ABV UP PARAM F/U: HCPCS | Performed by: HOSPITALIST

## 2019-01-16 PROCEDURE — G8484 FLU IMMUNIZE NO ADMIN: HCPCS | Performed by: HOSPITALIST

## 2019-01-16 PROCEDURE — 4040F PNEUMOC VAC/ADMIN/RCVD: CPT | Performed by: HOSPITALIST

## 2019-01-16 PROCEDURE — G8427 DOCREV CUR MEDS BY ELIG CLIN: HCPCS | Performed by: HOSPITALIST

## 2019-01-16 PROCEDURE — 1123F ACP DISCUSS/DSCN MKR DOCD: CPT | Performed by: HOSPITALIST

## 2019-01-16 PROCEDURE — 1090F PRES/ABSN URINE INCON ASSESS: CPT | Performed by: HOSPITALIST

## 2019-01-16 PROCEDURE — 99213 OFFICE O/P EST LOW 20 MIN: CPT | Performed by: HOSPITALIST

## 2019-01-16 PROCEDURE — 1101F PT FALLS ASSESS-DOCD LE1/YR: CPT | Performed by: HOSPITALIST

## 2019-01-16 PROCEDURE — 1036F TOBACCO NON-USER: CPT | Performed by: HOSPITALIST

## 2019-01-16 RX ORDER — FLUTICASONE PROPIONATE 50 MCG
1 SPRAY, SUSPENSION (ML) NASAL DAILY
Qty: 2 BOTTLE | Refills: 0 | Status: SHIPPED | OUTPATIENT
Start: 2019-01-16 | End: 2021-09-14

## 2019-01-16 RX ORDER — MECLIZINE HYDROCHLORIDE 25 MG/1
25 TABLET ORAL 3 TIMES DAILY PRN
Qty: 20 TABLET | Refills: 0 | Status: SHIPPED | OUTPATIENT
Start: 2019-01-16 | End: 2021-09-14

## 2019-01-16 RX ORDER — IBUPROFEN 400 MG/1
400 TABLET ORAL 2 TIMES DAILY PRN
Qty: 60 TABLET | Refills: 2 | Status: SHIPPED | OUTPATIENT
Start: 2019-01-16 | End: 2019-09-26 | Stop reason: ALTCHOICE

## 2019-01-16 ASSESSMENT — PATIENT HEALTH QUESTIONNAIRE - PHQ9
SUM OF ALL RESPONSES TO PHQ QUESTIONS 1-9: 0
SUM OF ALL RESPONSES TO PHQ QUESTIONS 1-9: 0
2. FEELING DOWN, DEPRESSED OR HOPELESS: 0
1. LITTLE INTEREST OR PLEASURE IN DOING THINGS: 0
SUM OF ALL RESPONSES TO PHQ9 QUESTIONS 1 & 2: 0

## 2019-03-11 ENCOUNTER — OFFICE VISIT (OUTPATIENT)
Dept: ORTHOPEDIC SURGERY | Age: 84
End: 2019-03-11
Payer: MEDICARE

## 2019-03-11 VITALS — WEIGHT: 176 LBS | BODY MASS INDEX: 32.39 KG/M2 | HEIGHT: 62 IN

## 2019-03-11 DIAGNOSIS — M17.0 PRIMARY OSTEOARTHRITIS OF BOTH KNEES: Primary | ICD-10-CM

## 2019-03-11 PROCEDURE — 20610 DRAIN/INJ JOINT/BURSA W/O US: CPT | Performed by: ORTHOPAEDIC SURGERY

## 2019-03-11 PROCEDURE — 99213 OFFICE O/P EST LOW 20 MIN: CPT | Performed by: ORTHOPAEDIC SURGERY

## 2019-03-11 RX ORDER — BUPIVACAINE HYDROCHLORIDE 2.5 MG/ML
2 INJECTION, SOLUTION INFILTRATION; PERINEURAL ONCE
Status: COMPLETED | OUTPATIENT
Start: 2019-03-11 | End: 2019-03-13

## 2019-03-11 RX ORDER — METHYLPREDNISOLONE ACETATE 80 MG/ML
80 INJECTION, SUSPENSION INTRA-ARTICULAR; INTRALESIONAL; INTRAMUSCULAR; SOFT TISSUE ONCE
Status: COMPLETED | OUTPATIENT
Start: 2019-03-11 | End: 2019-03-13

## 2019-03-11 RX ORDER — IBUPROFEN 400 MG/1
600 TABLET ORAL EVERY 8 HOURS PRN
Qty: 90 TABLET | Refills: 3 | Status: SHIPPED | OUTPATIENT
Start: 2019-03-11

## 2019-03-13 RX ADMIN — BUPIVACAINE HYDROCHLORIDE 5 MG: 2.5 INJECTION, SOLUTION INFILTRATION; PERINEURAL at 09:23

## 2019-03-13 RX ADMIN — METHYLPREDNISOLONE ACETATE 80 MG: 80 INJECTION, SUSPENSION INTRA-ARTICULAR; INTRALESIONAL; INTRAMUSCULAR; SOFT TISSUE at 09:24

## 2019-04-17 ENCOUNTER — TELEPHONE (OUTPATIENT)
Dept: INTERNAL MEDICINE | Age: 84
End: 2019-04-17

## 2019-04-17 ENCOUNTER — HOSPITAL ENCOUNTER (OUTPATIENT)
Age: 84
Discharge: HOME | End: 2019-04-17
Payer: MEDICARE

## 2019-04-17 ENCOUNTER — OFFICE VISIT (OUTPATIENT)
Dept: INTERNAL MEDICINE | Age: 84
End: 2019-04-17
Payer: MEDICARE

## 2019-04-17 VITALS
WEIGHT: 175.8 LBS | BODY MASS INDEX: 32.35 KG/M2 | HEIGHT: 62 IN | DIASTOLIC BLOOD PRESSURE: 62 MMHG | HEART RATE: 56 BPM | TEMPERATURE: 97.3 F | SYSTOLIC BLOOD PRESSURE: 115 MMHG

## 2019-04-17 DIAGNOSIS — R42 DIZZINESS: ICD-10-CM

## 2019-04-17 DIAGNOSIS — M17.0 PRIMARY OSTEOARTHRITIS OF BOTH KNEES: ICD-10-CM

## 2019-04-17 DIAGNOSIS — G43.009 MIGRAINE WITHOUT AURA AND WITHOUT STATUS MIGRAINOSUS, NOT INTRACTABLE: ICD-10-CM

## 2019-04-17 DIAGNOSIS — Z01.83 BLOOD TYPING ENCOUNTER: ICD-10-CM

## 2019-04-17 DIAGNOSIS — E55.9 VITAMIN D DEFICIENCY: ICD-10-CM

## 2019-04-17 DIAGNOSIS — E55.9 VITAMIN D DEFICIENCY: Primary | ICD-10-CM

## 2019-04-17 DIAGNOSIS — I67.82 SUBCORTICAL MICROVASCULAR ISCHEMIC OCCLUSIVE DISEASE: ICD-10-CM

## 2019-04-17 LAB
ABO/RH: NORMAL
VITAMIN D 25-HYDROXY: 25.5 NG/ML (ref 30–100)

## 2019-04-17 PROCEDURE — G8417 CALC BMI ABV UP PARAM F/U: HCPCS | Performed by: HOSPITALIST

## 2019-04-17 PROCEDURE — 36415 COLL VENOUS BLD VENIPUNCTURE: CPT

## 2019-04-17 PROCEDURE — G8427 DOCREV CUR MEDS BY ELIG CLIN: HCPCS | Performed by: HOSPITALIST

## 2019-04-17 PROCEDURE — 99211 OFF/OP EST MAY X REQ PHY/QHP: CPT | Performed by: INTERNAL MEDICINE

## 2019-04-17 PROCEDURE — 4040F PNEUMOC VAC/ADMIN/RCVD: CPT | Performed by: HOSPITALIST

## 2019-04-17 PROCEDURE — 1123F ACP DISCUSS/DSCN MKR DOCD: CPT | Performed by: HOSPITALIST

## 2019-04-17 PROCEDURE — 86901 BLOOD TYPING SEROLOGIC RH(D): CPT

## 2019-04-17 PROCEDURE — 86900 BLOOD TYPING SEROLOGIC ABO: CPT

## 2019-04-17 PROCEDURE — 1036F TOBACCO NON-USER: CPT | Performed by: HOSPITALIST

## 2019-04-17 PROCEDURE — 82306 VITAMIN D 25 HYDROXY: CPT

## 2019-04-17 PROCEDURE — 1090F PRES/ABSN URINE INCON ASSESS: CPT | Performed by: HOSPITALIST

## 2019-04-17 PROCEDURE — 99213 OFFICE O/P EST LOW 20 MIN: CPT | Performed by: HOSPITALIST

## 2019-04-17 NOTE — PROGRESS NOTES
Visit Information    Have you changed or started any medications since your last visit including any over-the-counter medicines, vitamins, or herbal medicines? no   Have you stopped taking any of your medications? Is so, why? -  no  Are you having any side effects from any of your medications? - no    Have you seen any other physician or provider since your last visit? Yes Orthopedic  Have you had any other diagnostic tests since your last visit?  no   Have you been seen in the emergency room and/or had an admission in a hospital since we last saw you?  no   Have you had your routine dental cleaning in the past 6 months?  no     Do you have an active MyChart account? If no, what is the barrier?   No:     Patient Care Team:  Leti Wilkins MD as PCP - General (Internal Medicine)  Wendy Canela MD as PCP - S Attributed Provider  Jc Grubbs MD as Referring Physician (Internal Medicine)    Medical History Review  Past Medical, Family, and Social History reviewed and does contribute to the patient presenting condition    Health Maintenance   Topic Date Due    Shingles Vaccine (1 of 2) 08/16/1980    DTaP/Tdap/Td vaccine (1 - Tdap) 09/01/2026 (Originally 8/16/1949)    Potassium monitoring  08/23/2019    Creatinine monitoring  08/23/2019    Flu vaccine (Season Ended) 09/01/2019    Pneumococcal 65+ years Vaccine  Completed

## 2019-04-17 NOTE — PROGRESS NOTES
Attending Physician Statement GE  I have discussed the care of Marinasanjiv Little, including pertinent history and exam findings with the resident. I have reviewed the key elements of all parts of the encounter with the resident. Knee OA- follows with ortho  Uses NSAIDS as needed  H/o vitamin D def-will order levels    Return in about 3 months (around 7/17/2019).     Wojciech Porras MD

## 2019-04-17 NOTE — PROGRESS NOTES
[facility]  [facility] [address] [facility]  [address] [facility] [address]  Dept: [phone]  Dept Fax: [phone]    Office Progress/Follow Up Note  Date of patient's visit: 4/17/2019  Patient's Name:  Nini Guzman YOB: 1930            Patient Care Team:  Higinio Loo MD as PCP - General (Internal Medicine)  Alex Dao MD as PCP - S Attributed Provider  Lakisha North MD as Referring Physician (Internal Medicine)    REASON FOR VISIT: Routine outpatient follow up    HISTORY OF PRESENT ILLNESS:      Chief Complaint   Patient presents with    3 Month Follow-Up     here for 3 month follow up HTN       History was obtained from: patient. Nini Guzman is a 80 y.o. is here for a follow up. She follows with orthopedic surgery for bilateral knee arthritis and receives injections. Last one was a month ago. She uses ibuprofen intermittently for knee pain. She has h/o migraine headaches and uses ibuprofen for that. She use to be on propanol/verapamil but were stopped due to hypotension and bradycardia for which she was hospitalized. It was since resolved. She was found to have vitamin d deficiency on weekly ergocalciferol. Will get vti D level checked and give her daily 800 units as well. She states at times at home she does get short of breath and has some chest pains when she exerts herself more than she needs to. She had an ECHO 2 years back which showed EF of 55% with grade 1 diastolic dysfunction and no valvular abnormalities and no report but she had a heart cath in 2015 which she states was normal. She has not needed to use SL nitro in a while. Have advised her to try taking that next time if she has those type of symptoms.        Patient Active Problem List   Diagnosis    Dizziness    Essential hypertension    Subcortical microvascular ischemic occlusive disease    Pure hypercholesterolemia    History of myasthenia gravis    Personal history of systemic lupus erythematosus (SLE)    Migraine without aura and without status migrainosus, not intractable    Primary osteoarthritis of knee    Cancer, skin, squamous cell    Actinic keratosis         Health Maintenance Due   Topic Date Due    Shingles Vaccine (1 of 2) 08/16/1980         Allergies   Allergen Reactions    Seasonal      Certain plants    Shellfish-Derived Products Hives         MEDICATIONS:      Current Outpatient Medications   Medication Sig Dispense Refill    Cholecalciferol 800 units TABS Take 1 tablet by mouth daily 90 tablet 1    ibuprofen (ADVIL;MOTRIN) 400 MG tablet Take 1.5 tablets by mouth every 8 hours as needed for Pain 90 tablet 3    meclizine (ANTIVERT) 25 MG tablet Take 1 tablet by mouth 3 times daily as needed for Dizziness 20 tablet 0    aspirin 81 MG tablet Take 1 tablet by mouth daily 30 tablet 3    Compression Stockings MISC by Does not apply route 30 to 45 mm Hg  Thigh high 1 each 0    ibuprofen (ADVIL;MOTRIN) 400 MG tablet Take 1 tablet by mouth 2 times daily as needed for Pain 60 tablet 2    fluticasone (FLONASE) 50 MCG/ACT nasal spray 1 spray by Each Nare route daily 1 Spray in each nostril 2 Bottle 0    fluorouracil (EFUDEX) 5 % cream Apply twice daily to actinic keratosis for 2-4 weeks stopping when red and irritated 40 g 0    nitroGLYCERIN (NITROSTAT) 0.4 MG SL tablet Place 1 tablet under the tongue every 5 minutes as needed for Chest pain. 25 tablet 3     No current facility-administered medications for this visit. SOCIAL HISTORY    Reviewed and no change from previous record. Astrid Hines  reports that she has never smoked.  She has never used smokeless tobacco.    FAMILY HISTORY:    Reviewed and No change from previous visit    REVIEW OF SYSTEMS:    CONSTITUTIONAL: Denies: fever, chills  PSYCH: Denies: anxiety, depression  ALLERGIES: Denies: urticaria  EYES: Denies: blurry vision, decreased vision, photophobia  ENT: Denies: sore consider starting prophylactic medication    5. Primary osteoarthritis of both knees  Ibuprofen  F/u with orthopedic surgery    6. Blood typing encounter  - Type And Screen; Future          Health Maintenance    FOLLOW UP AND INSTRUCTIONS:   Return in about 3 months (around 7/17/2019). · Iris Banuelos received counseling on the following healthy behaviors: nutrition, exercise and medication adherence    · Discussed use, benefit, and side effects of prescribed medications. Barriers to medication compliance addressed. All patient questions answered. Pt voiced understanding.      · Patient given educational materials - see patient instructions    Raquib Sharyle Lutes, MD  PGY-3 Internal Medicine Resident

## 2019-04-17 NOTE — TELEPHONE ENCOUNTER
Shilpa calling from [facility] out patient lab stating that patient is in for bloodwork and the test Dr ordered for the patient to find out her blood type is very expensive and typically only used before surgeries.  She states there is a much less expensive test called [address] to find blood type and she is asking if it is okay to switch in case patients insurance does not cover testing if there is any issues with the switch please contact the lab at [phone]        Spoke with Dr Rom Pollack and she was okay with switching the test -- verbal given to Robb Arriola

## 2019-06-20 ENCOUNTER — OFFICE VISIT (OUTPATIENT)
Dept: ORTHOPEDIC SURGERY | Age: 84
End: 2019-06-20
Payer: MEDICARE

## 2019-06-20 VITALS — BODY MASS INDEX: 31.83 KG/M2 | WEIGHT: 173 LBS | HEIGHT: 62 IN

## 2019-06-20 DIAGNOSIS — M17.0 PRIMARY OSTEOARTHRITIS OF BOTH KNEES: Primary | ICD-10-CM

## 2019-06-20 PROCEDURE — 4040F PNEUMOC VAC/ADMIN/RCVD: CPT | Performed by: ORTHOPAEDIC SURGERY

## 2019-06-20 PROCEDURE — G8427 DOCREV CUR MEDS BY ELIG CLIN: HCPCS | Performed by: ORTHOPAEDIC SURGERY

## 2019-06-20 PROCEDURE — 1090F PRES/ABSN URINE INCON ASSESS: CPT | Performed by: ORTHOPAEDIC SURGERY

## 2019-06-20 PROCEDURE — 1123F ACP DISCUSS/DSCN MKR DOCD: CPT | Performed by: ORTHOPAEDIC SURGERY

## 2019-06-20 PROCEDURE — G8417 CALC BMI ABV UP PARAM F/U: HCPCS | Performed by: ORTHOPAEDIC SURGERY

## 2019-06-20 PROCEDURE — 20610 DRAIN/INJ JOINT/BURSA W/O US: CPT | Performed by: ORTHOPAEDIC SURGERY

## 2019-06-20 PROCEDURE — 99213 OFFICE O/P EST LOW 20 MIN: CPT | Performed by: ORTHOPAEDIC SURGERY

## 2019-06-20 PROCEDURE — 1036F TOBACCO NON-USER: CPT | Performed by: ORTHOPAEDIC SURGERY

## 2019-06-20 RX ORDER — BUPIVACAINE HYDROCHLORIDE 2.5 MG/ML
2 INJECTION, SOLUTION INFILTRATION; PERINEURAL ONCE
Status: COMPLETED | OUTPATIENT
Start: 2019-06-20 | End: 2019-06-21

## 2019-06-20 RX ORDER — METHYLPREDNISOLONE ACETATE 80 MG/ML
80 INJECTION, SUSPENSION INTRA-ARTICULAR; INTRALESIONAL; INTRAMUSCULAR; SOFT TISSUE ONCE
Status: COMPLETED | OUTPATIENT
Start: 2019-06-20 | End: 2019-06-21

## 2019-06-20 ASSESSMENT — ENCOUNTER SYMPTOMS
SHORTNESS OF BREATH: 0
BACK PAIN: 0
WHEEZING: 0

## 2019-06-20 NOTE — PROGRESS NOTES
[address] [place] [address]  Dept: [phone]  Dept Fax: [phone]        Ambulatory Follow Up      Subjective:   Pato Benton is a 80y.o. year old female who presents to our office today for routine followup regarding her   1. Primary osteoarthritis of both knees    . Chief Complaint   Patient presents with    Knee Pain     bilateral       HPI Pato Benton  is a 80 y.o. female who presents today in follow for bilateral knee pain. The patient was last seen on 3/11/2019 and underwent treatment in the form of corticosteroid injection of bilateral knees. The patient notes improvement with the previous treatment until recently. Patient's son notes patient fell last week onto her her hip and buttocks. She notes her knees buckle on her and cause her to fall. She does use a cane and she does live alone. Review of Systems   Constitutional: Negative for chills, diaphoresis and fever. Respiratory: Negative for shortness of breath and wheezing. Cardiovascular: Negative for chest pain, palpitations and leg swelling. Musculoskeletal: Positive for arthralgias (bilateral knees). Negative for back pain, gait problem, joint swelling, myalgias, neck pain and neck stiffness. Neurological: Negative for weakness and numbness. I have reviewed the CC, HPI, ROS, PMH, FHX, Social History, and if not present in this note, I have reviewed in the patient's chart. I agree with the documentation provided by other staff and have reviewed their documentation prior to providing my signature indicating agreement. Objective :   Ht 5' 2\" (1.575 m)   Wt 173 lb (78.5 kg)   BMI 31.64 kg/m²  Body mass index is 31.64 kg/m². General: Pato Benton is a 80 y.o. female who is alert and oriented and sitting comfortably in our office. Ortho Exam  MS:  Evaluation of the Bilateral knee reveals no significant outward deformity.   There is no erythema, warmth, skin lesions, signs of infection. There is tenderness over the medial joint line. There is a mildknee effusion. Range of motion of the Bilateral knee is  0-95. No instability of the knee is appreciated at 0 and 30° of flexion. There is a negative anterior drawer Lachman's test.  There is increased pain with valgus Rosales's testing. No calf tenderness is noted. There is a negative hip log roll and Stinchfield test.  Motor, sensory, vascular examination to the Bilateral lower extremity is intact. Patient has full range of motion of the ankle. Neuro: alert and oriented to person and place. Eyes: Extra-ocular muscles intact  Mouth: Oral mucosa moist. No perioral lesions  Pulm: Respirations unlabored and regular. Symmetric chest excursion without outward deformity is noted. Skin: warm, well perfused  Psych:   Patient has good fund of knowledge and displays understanging of exam, diagnosis, and plan. Radiology:     No results found. KNEE INJECTION PROCEDURE NOTE:  The patient was identified. The right knee was confirmed with the patient. After a sterile prep with Betadine the knee was injected using a lateral joint line approach with a mixture of 2 mL of 0.25% Marcaine and 80 mg of Depo-Medrol. Patient tolerated the procedure well without post injection complications. I instructed the patient to call our office immediately if they have any swelling or increased pain at the injection site. KNEE INJECTION PROCEDURE NOTE:  The patient was identified. The left knee was confirmed with the patient. After a sterile prep with Betadine the knee was injected using a lateral joint line approach with a mixture of 2 mL of 0.25% Marcaine and 80 mg of Depo-Medrol. Patient tolerated the procedure well without post injection complications. I instructed the patient to call our office immediately if they have any swelling or increased pain at the injection site. Assessment:      1. Primary osteoarthritis of both knees       Plan:      Discussed etiology and natural history of bilateral knee arthritis. We discussed that patient should use a walker for safety because of her knees giving out and her recent falls. The patient would like to proceed with bilateral knee corticosteroid injections. I gave the patient a prescription for a rolling walker incase she is unable to locate one on her own. The patient will follow up as needed. We discussed that the patient should call us with any concerns or questions. Follow up:Return if symptoms worsen or fail to improve. Orders Placed This Encounter   Medications    Misc. Devices MISC     Sig: Rolling walker     Dispense:  1 Device     Refill:  0    bupivacaine (MARCAINE) 0.25 % injection 5 mg    bupivacaine (MARCAINE) 0.25 % injection 5 mg    methylPREDNISolone acetate (DEPO-MEDROL) injection 80 mg    methylPREDNISolone acetate (DEPO-MEDROL) injection 80 mg         Orders Placed This Encounter   Procedures    [id number] - ID DRAIN/INJECT LARGE JOINT/BURSA     ILavon RMA am scribing for and in the presence of Dr. Andres Christine. 6/23/2019 6:13 PM    I have reviewed and made changes accordingly to the work scribed by CHU Weems. The documentation accurately reflects work and decisions made by me. I have also reviewed documentation completed by clinical staff.     Andres Christine DO, [facility]  6/23/2019 6:13 PM    This note is created with the assistance of a speech recognition program.  While intending to generate a document that actually reflects the content of the visit, the document can still have some errors including those of syntax and sound a like substitutions which may escape proof reading.  In such instances, actual meaning can be extrapolated by contextual diversion      Electronically signed by Mario Meneses DO, FLACO on 6/23/2019 at 6:13 PM

## 2019-06-21 RX ADMIN — METHYLPREDNISOLONE ACETATE 80 MG: 80 INJECTION, SUSPENSION INTRA-ARTICULAR; INTRALESIONAL; INTRAMUSCULAR; SOFT TISSUE at 08:00

## 2019-06-21 RX ADMIN — BUPIVACAINE HYDROCHLORIDE 5 MG: 2.5 INJECTION, SOLUTION INFILTRATION; PERINEURAL at 07:59

## 2019-07-25 ENCOUNTER — TELEPHONE (OUTPATIENT)
Dept: FAMILY MEDICINE CLINIC | Age: 84
End: 2019-07-25

## 2019-07-25 NOTE — TELEPHONE ENCOUNTER
Patient on list for Medicare Wellness.     Telephone Outcome: Other - Patient transferred from Dr. Jay Azul

## 2019-09-05 ENCOUNTER — OFFICE VISIT (OUTPATIENT)
Dept: ORTHOPEDIC SURGERY | Age: 84
End: 2019-09-05
Payer: MEDICARE

## 2019-09-05 VITALS — HEIGHT: 62 IN | BODY MASS INDEX: 29.44 KG/M2 | WEIGHT: 160 LBS

## 2019-09-05 DIAGNOSIS — M17.0 PRIMARY OSTEOARTHRITIS OF BOTH KNEES: Primary | ICD-10-CM

## 2019-09-05 PROCEDURE — 1123F ACP DISCUSS/DSCN MKR DOCD: CPT | Performed by: ORTHOPAEDIC SURGERY

## 2019-09-05 PROCEDURE — 1090F PRES/ABSN URINE INCON ASSESS: CPT | Performed by: ORTHOPAEDIC SURGERY

## 2019-09-05 PROCEDURE — 1036F TOBACCO NON-USER: CPT | Performed by: ORTHOPAEDIC SURGERY

## 2019-09-05 PROCEDURE — 4040F PNEUMOC VAC/ADMIN/RCVD: CPT | Performed by: ORTHOPAEDIC SURGERY

## 2019-09-05 PROCEDURE — G8417 CALC BMI ABV UP PARAM F/U: HCPCS | Performed by: ORTHOPAEDIC SURGERY

## 2019-09-05 PROCEDURE — G8427 DOCREV CUR MEDS BY ELIG CLIN: HCPCS | Performed by: ORTHOPAEDIC SURGERY

## 2019-09-05 PROCEDURE — 99214 OFFICE O/P EST MOD 30 MIN: CPT | Performed by: ORTHOPAEDIC SURGERY

## 2019-09-05 PROCEDURE — 20610 DRAIN/INJ JOINT/BURSA W/O US: CPT | Performed by: ORTHOPAEDIC SURGERY

## 2019-09-05 RX ORDER — METHYLPREDNISOLONE ACETATE 80 MG/ML
80 INJECTION, SUSPENSION INTRA-ARTICULAR; INTRALESIONAL; INTRAMUSCULAR; SOFT TISSUE ONCE
Status: COMPLETED | OUTPATIENT
Start: 2019-09-05 | End: 2019-09-06

## 2019-09-05 RX ORDER — BUPIVACAINE HYDROCHLORIDE 2.5 MG/ML
2 INJECTION, SOLUTION INFILTRATION; PERINEURAL ONCE
Status: COMPLETED | OUTPATIENT
Start: 2019-09-05 | End: 2019-09-06

## 2019-09-05 RX ORDER — TRAMADOL HYDROCHLORIDE 50 MG/1
50 TABLET ORAL NIGHTLY PRN
Qty: 7 TABLET | Refills: 0 | Status: SHIPPED | OUTPATIENT
Start: 2019-09-05 | End: 2019-09-12

## 2019-09-05 ASSESSMENT — ENCOUNTER SYMPTOMS
DIARRHEA: 0
COUGH: 0
NAUSEA: 0
CONSTIPATION: 0

## 2019-09-05 NOTE — PROGRESS NOTES
[facility]  [address] [facility] [address]  Dept: [phone]  Dept Fax: [phone]        Ambulatory Follow Up      Subjective:   Dinorah Valderrama is a 80y.o. year old female who presents to our office today for routine followup regarding her   1. Primary osteoarthritis of both knees    . Chief Complaint   Patient presents with    Knee Pain     bilateral        HPI-Dasha Mireles  is a 80 y.o. female who presents today in follow for bilateral knee arthritis. The patient was last seen on 6/20/2019 and underwent treatment in the form of bilateral knee corticosteroid injections. The patient notes improvement with the previous treatment until about 3 weeks ago. Patient says it has been painful to walk and hard to sleep at night. Patient also mentioned that a couple years ago a doctor gave her 50mg tramadol and she cut them in half to help her sleep at night and she would like to know if that can be an option until she establishes a PCP. Review of Systems   Constitutional: Negative for chills and fever. Respiratory: Negative for cough. Gastrointestinal: Negative for constipation, diarrhea and nausea. Musculoskeletal: Positive for arthralgias (bilateral knees). Negative for gait problem, joint swelling and myalgias. Neurological: Negative for dizziness, weakness and numbness. I have reviewed the CC, HPI, ROS, PMH, FHX, Social History, and if not present in this note, I have reviewed in the patient's chart. I agree with the documentation provided by other staff and have reviewed their documentation prior to providing my signature indicating agreement. Objective :   Ht 5' 2.01\" (1.575 m)   Wt 160 lb (72.6 kg)   BMI 29.26 kg/m²  Body mass index is 29.26 kg/m². General: Dinorah Valderrama is a 80 y.o. female who is alert and oriented and sitting comfortably in our office. Ortho Exam  MS:  Mild limp with a cane.  Evaluation of the swelling or increased pain at the injection site. Assessment:      1. Primary osteoarthritis of both knees       Plan:      Patient opted for bilateral knee corticosteroid injections today in office. Will prescribe patient tramadol at night. Will give patient a 7 day supply but patient will split in half to last her two weeks. Patient is in the middle of changing PCP's. We told her that if she is going to be on tramadol chronically to have her PCP management this medication when she gets established. Follow up as needed. Follow up:Return if symptoms worsen or fail to improve. Orders Placed This Encounter   Medications    bupivacaine (MARCAINE) 0.25 % injection 5 mg    methylPREDNISolone acetate (DEPO-MEDROL) injection 80 mg    methylPREDNISolone acetate (DEPO-MEDROL) injection 80 mg    bupivacaine (MARCAINE) 0.25 % injection 5 mg    traMADol (ULTRAM) 50 MG tablet     Sig: Take 1 tablet by mouth nightly as needed for Pain for up to 7 days. Dispense:  7 tablet     Refill:  0     Reduce doses taken as pain becomes manageable         Orders Placed This Encounter   Procedures    (2)  47927 - [place] DRAIN/INJECT LARGE JOINT/BURSA     I, Genesis Gilbert RN am scribing for and in the presence of Dr. Joanne Varma  9/8/2019 4:24 PM      I have reviewed and made changes accordingly to the work scribed by Genesis Gilbert RN. The documentation accurately reflects work and decisions made by me. I have also reviewed documentation completed by clinical staff.     Joanne Varma DO, [address] [facility]  9/8/2019 4:24 PM    This note is created with the assistance of a speech recognition program.  While intending to generate a document that actually reflects the content of the visit, the document can still have some errors including those of syntax and sound a like substitutions which may escape proof reading.  In such instances, actual meaning can be extrapolated by contextual

## 2019-09-06 RX ADMIN — BUPIVACAINE HYDROCHLORIDE 5 MG: 2.5 INJECTION, SOLUTION INFILTRATION; PERINEURAL at 07:51

## 2019-09-06 RX ADMIN — METHYLPREDNISOLONE ACETATE 80 MG: 80 INJECTION, SUSPENSION INTRA-ARTICULAR; INTRALESIONAL; INTRAMUSCULAR; SOFT TISSUE at 07:52

## 2019-09-06 RX ADMIN — METHYLPREDNISOLONE ACETATE 80 MG: 80 INJECTION, SUSPENSION INTRA-ARTICULAR; INTRALESIONAL; INTRAMUSCULAR; SOFT TISSUE at 07:51

## 2019-09-26 ENCOUNTER — OFFICE VISIT (OUTPATIENT)
Dept: INTERNAL MEDICINE | Age: 84
End: 2019-09-26
Payer: MEDICARE

## 2019-09-26 VITALS
BODY MASS INDEX: 29.26 KG/M2 | WEIGHT: 160 LBS | SYSTOLIC BLOOD PRESSURE: 144 MMHG | HEART RATE: 67 BPM | DIASTOLIC BLOOD PRESSURE: 78 MMHG

## 2019-09-26 DIAGNOSIS — M17.0 PRIMARY OSTEOARTHRITIS OF BOTH KNEES: Primary | ICD-10-CM

## 2019-09-26 DIAGNOSIS — R07.9 CHEST PAIN IN ADULT: ICD-10-CM

## 2019-09-26 DIAGNOSIS — R42 DIZZINESS: Chronic | ICD-10-CM

## 2019-09-26 DIAGNOSIS — Z23 NEEDS FLU SHOT: ICD-10-CM

## 2019-09-26 DIAGNOSIS — E55.9 VITAMIN D DEFICIENCY: ICD-10-CM

## 2019-09-26 DIAGNOSIS — I67.82 SUBCORTICAL MICROVASCULAR ISCHEMIC OCCLUSIVE DISEASE: ICD-10-CM

## 2019-09-26 DIAGNOSIS — G43.009 MIGRAINE WITHOUT AURA AND WITHOUT STATUS MIGRAINOSUS, NOT INTRACTABLE: ICD-10-CM

## 2019-09-26 PROCEDURE — G8427 DOCREV CUR MEDS BY ELIG CLIN: HCPCS | Performed by: STUDENT IN AN ORGANIZED HEALTH CARE EDUCATION/TRAINING PROGRAM

## 2019-09-26 PROCEDURE — 90688 IIV4 VACCINE SPLT 0.5 ML IM: CPT | Performed by: STUDENT IN AN ORGANIZED HEALTH CARE EDUCATION/TRAINING PROGRAM

## 2019-09-26 PROCEDURE — 99211 OFF/OP EST MAY X REQ PHY/QHP: CPT | Performed by: INTERNAL MEDICINE

## 2019-09-26 PROCEDURE — 4040F PNEUMOC VAC/ADMIN/RCVD: CPT | Performed by: STUDENT IN AN ORGANIZED HEALTH CARE EDUCATION/TRAINING PROGRAM

## 2019-09-26 PROCEDURE — 99214 OFFICE O/P EST MOD 30 MIN: CPT | Performed by: STUDENT IN AN ORGANIZED HEALTH CARE EDUCATION/TRAINING PROGRAM

## 2019-09-26 PROCEDURE — G8417 CALC BMI ABV UP PARAM F/U: HCPCS | Performed by: STUDENT IN AN ORGANIZED HEALTH CARE EDUCATION/TRAINING PROGRAM

## 2019-09-26 PROCEDURE — 1123F ACP DISCUSS/DSCN MKR DOCD: CPT | Performed by: STUDENT IN AN ORGANIZED HEALTH CARE EDUCATION/TRAINING PROGRAM

## 2019-09-26 PROCEDURE — 1036F TOBACCO NON-USER: CPT | Performed by: STUDENT IN AN ORGANIZED HEALTH CARE EDUCATION/TRAINING PROGRAM

## 2019-09-26 PROCEDURE — 1090F PRES/ABSN URINE INCON ASSESS: CPT | Performed by: STUDENT IN AN ORGANIZED HEALTH CARE EDUCATION/TRAINING PROGRAM

## 2019-09-26 RX ORDER — ERGOCALCIFEROL (VITAMIN D2) 10 MCG
800 TABLET ORAL DAILY
Qty: 30 TABLET | Refills: 3 | Status: SHIPPED | OUTPATIENT
Start: 2019-09-26 | End: 2021-09-14

## 2019-09-26 RX ORDER — ASPIRIN 81 MG/1
81 TABLET, CHEWABLE ORAL DAILY
Qty: 30 TABLET | Refills: 3 | Status: SHIPPED | OUTPATIENT
Start: 2019-09-26 | End: 2021-09-14

## 2019-09-26 RX ORDER — IBUPROFEN 400 MG/1
400 TABLET ORAL EVERY 6 HOURS PRN
Qty: 120 TABLET | Refills: 3 | Status: SHIPPED | OUTPATIENT
Start: 2019-09-26 | End: 2021-09-14

## 2019-09-26 RX ORDER — NITROGLYCERIN 0.4 MG/1
TABLET SUBLINGUAL
Qty: 25 TABLET | Refills: 3 | Status: SHIPPED | OUTPATIENT
Start: 2019-09-26 | End: 2021-09-14

## 2019-09-26 RX ORDER — ACETAMINOPHEN 500 MG
500 TABLET ORAL DAILY
Qty: 120 TABLET | Refills: 5 | Status: SHIPPED | OUTPATIENT
Start: 2019-09-26 | End: 2021-09-14

## 2019-09-26 RX ORDER — MECLIZINE HCL 12.5 MG/1
25 TABLET ORAL 3 TIMES DAILY PRN
Qty: 60 TABLET | Refills: 0 | Status: SHIPPED | OUTPATIENT
Start: 2019-09-26 | End: 2019-10-06

## 2019-12-03 DIAGNOSIS — M17.0 PRIMARY OSTEOARTHRITIS OF BOTH KNEES: Primary | ICD-10-CM

## 2019-12-16 ENCOUNTER — TELEPHONE (OUTPATIENT)
Dept: ORTHOPEDIC SURGERY | Age: 84
End: 2019-12-16

## 2019-12-16 ENCOUNTER — OFFICE VISIT (OUTPATIENT)
Dept: ORTHOPEDIC SURGERY | Age: 84
End: 2019-12-16
Payer: MEDICARE

## 2019-12-16 VITALS — BODY MASS INDEX: 29.45 KG/M2 | HEIGHT: 62 IN | WEIGHT: 160.05 LBS

## 2019-12-16 DIAGNOSIS — M17.0 PRIMARY OSTEOARTHRITIS OF BOTH KNEES: Primary | ICD-10-CM

## 2019-12-16 PROCEDURE — G8482 FLU IMMUNIZE ORDER/ADMIN: HCPCS | Performed by: ORTHOPAEDIC SURGERY

## 2019-12-16 PROCEDURE — G8417 CALC BMI ABV UP PARAM F/U: HCPCS | Performed by: ORTHOPAEDIC SURGERY

## 2019-12-16 PROCEDURE — 4040F PNEUMOC VAC/ADMIN/RCVD: CPT | Performed by: ORTHOPAEDIC SURGERY

## 2019-12-16 PROCEDURE — 99214 OFFICE O/P EST MOD 30 MIN: CPT | Performed by: ORTHOPAEDIC SURGERY

## 2019-12-16 PROCEDURE — 1036F TOBACCO NON-USER: CPT | Performed by: ORTHOPAEDIC SURGERY

## 2019-12-16 PROCEDURE — G8427 DOCREV CUR MEDS BY ELIG CLIN: HCPCS | Performed by: ORTHOPAEDIC SURGERY

## 2019-12-16 PROCEDURE — 20610 DRAIN/INJ JOINT/BURSA W/O US: CPT | Performed by: ORTHOPAEDIC SURGERY

## 2019-12-16 PROCEDURE — 1090F PRES/ABSN URINE INCON ASSESS: CPT | Performed by: ORTHOPAEDIC SURGERY

## 2019-12-16 PROCEDURE — 1123F ACP DISCUSS/DSCN MKR DOCD: CPT | Performed by: ORTHOPAEDIC SURGERY

## 2019-12-16 ASSESSMENT — ENCOUNTER SYMPTOMS
SHORTNESS OF BREATH: 0
BACK PAIN: 0
WHEEZING: 0

## 2019-12-17 RX ORDER — METHYLPREDNISOLONE ACETATE 80 MG/ML
80 INJECTION, SUSPENSION INTRA-ARTICULAR; INTRALESIONAL; INTRAMUSCULAR; SOFT TISSUE ONCE
Status: COMPLETED | OUTPATIENT
Start: 2019-12-17 | End: 2019-12-17

## 2019-12-17 RX ORDER — BUPIVACAINE HYDROCHLORIDE 2.5 MG/ML
2 INJECTION, SOLUTION INFILTRATION; PERINEURAL ONCE
Status: COMPLETED | OUTPATIENT
Start: 2019-12-17 | End: 2019-12-17

## 2019-12-17 RX ORDER — METHYLPREDNISOLONE ACETATE 80 MG/ML
80 INJECTION, SUSPENSION INTRA-ARTICULAR; INTRALESIONAL; INTRAMUSCULAR; SOFT TISSUE ONCE
Status: DISCONTINUED | OUTPATIENT
Start: 2019-12-17 | End: 2019-12-17

## 2019-12-17 RX ADMIN — METHYLPREDNISOLONE ACETATE 80 MG: 80 INJECTION, SUSPENSION INTRA-ARTICULAR; INTRALESIONAL; INTRAMUSCULAR; SOFT TISSUE at 08:23

## 2019-12-17 RX ADMIN — BUPIVACAINE HYDROCHLORIDE 5 MG: 2.5 INJECTION, SOLUTION INFILTRATION; PERINEURAL at 08:22

## 2019-12-17 RX ADMIN — METHYLPREDNISOLONE ACETATE 80 MG: 80 INJECTION, SUSPENSION INTRA-ARTICULAR; INTRALESIONAL; INTRAMUSCULAR; SOFT TISSUE at 08:24

## 2020-02-12 ENCOUNTER — TELEPHONE (OUTPATIENT)
Dept: INTERNAL MEDICINE | Age: 85
End: 2020-02-12

## 2020-02-12 NOTE — TELEPHONE ENCOUNTER
[facility] calling about pt they have not been able to see pt as of yet. Pt has canceled on them 3 times now. 1 first time was due to company 2nd was not at home 3rd was today due to not being able to get out of bed. She is going to try to contact the pt to see if she can come and stop by at least. She is not sure if it is a adult protective service thing as they don't know the pt and her situation. They want to know how you want them to proceed.  Please advise

## 2020-02-16 NOTE — TELEPHONE ENCOUNTER
Update:  Physical therapy order given. Please send the fax.     Thank you      Jackie Bucio MD, PGY-1  Internal Medicine Residency Program  [facility]  2/16/2020 4:57 PM

## 2020-05-07 ENCOUNTER — OFFICE VISIT (OUTPATIENT)
Dept: ORTHOPEDIC SURGERY | Age: 85
End: 2020-05-07
Payer: MEDICARE

## 2020-05-07 VITALS — BODY MASS INDEX: 29.44 KG/M2 | WEIGHT: 160 LBS | HEIGHT: 62 IN

## 2020-05-07 PROCEDURE — 20610 DRAIN/INJ JOINT/BURSA W/O US: CPT | Performed by: ORTHOPAEDIC SURGERY

## 2020-05-07 PROCEDURE — 4040F PNEUMOC VAC/ADMIN/RCVD: CPT | Performed by: ORTHOPAEDIC SURGERY

## 2020-05-07 PROCEDURE — G8417 CALC BMI ABV UP PARAM F/U: HCPCS | Performed by: ORTHOPAEDIC SURGERY

## 2020-05-07 PROCEDURE — 99213 OFFICE O/P EST LOW 20 MIN: CPT | Performed by: ORTHOPAEDIC SURGERY

## 2020-05-07 PROCEDURE — 1090F PRES/ABSN URINE INCON ASSESS: CPT | Performed by: ORTHOPAEDIC SURGERY

## 2020-05-07 PROCEDURE — 1036F TOBACCO NON-USER: CPT | Performed by: ORTHOPAEDIC SURGERY

## 2020-05-07 PROCEDURE — 1123F ACP DISCUSS/DSCN MKR DOCD: CPT | Performed by: ORTHOPAEDIC SURGERY

## 2020-05-07 PROCEDURE — G8427 DOCREV CUR MEDS BY ELIG CLIN: HCPCS | Performed by: ORTHOPAEDIC SURGERY

## 2020-05-07 RX ORDER — METHYLPREDNISOLONE ACETATE 80 MG/ML
80 INJECTION, SUSPENSION INTRA-ARTICULAR; INTRALESIONAL; INTRAMUSCULAR; SOFT TISSUE ONCE
Status: COMPLETED | OUTPATIENT
Start: 2020-05-07 | End: 2020-05-08

## 2020-05-07 RX ORDER — BUPIVACAINE HYDROCHLORIDE 2.5 MG/ML
2 INJECTION, SOLUTION INFILTRATION; PERINEURAL ONCE
Status: COMPLETED | OUTPATIENT
Start: 2020-05-07 | End: 2020-05-08

## 2020-05-07 ASSESSMENT — ENCOUNTER SYMPTOMS
APNEA: 0
VOMITING: 0
COUGH: 0
ABDOMINAL PAIN: 0
CHEST TIGHTNESS: 0

## 2020-05-08 RX ADMIN — METHYLPREDNISOLONE ACETATE 80 MG: 80 INJECTION, SUSPENSION INTRA-ARTICULAR; INTRALESIONAL; INTRAMUSCULAR; SOFT TISSUE at 14:36

## 2020-05-08 RX ADMIN — BUPIVACAINE HYDROCHLORIDE 5 MG: 2.5 INJECTION, SOLUTION INFILTRATION; PERINEURAL at 14:36

## 2020-08-14 ENCOUNTER — OFFICE VISIT (OUTPATIENT)
Dept: ORTHOPEDIC SURGERY | Age: 85
End: 2020-08-14
Payer: MEDICARE

## 2020-08-14 VITALS — WEIGHT: 160 LBS | BODY MASS INDEX: 29.44 KG/M2 | HEIGHT: 62 IN

## 2020-08-14 PROCEDURE — 1036F TOBACCO NON-USER: CPT | Performed by: ORTHOPAEDIC SURGERY

## 2020-08-14 PROCEDURE — 20610 DRAIN/INJ JOINT/BURSA W/O US: CPT | Performed by: ORTHOPAEDIC SURGERY

## 2020-08-14 PROCEDURE — 99214 OFFICE O/P EST MOD 30 MIN: CPT | Performed by: ORTHOPAEDIC SURGERY

## 2020-08-14 PROCEDURE — 1090F PRES/ABSN URINE INCON ASSESS: CPT | Performed by: ORTHOPAEDIC SURGERY

## 2020-08-14 PROCEDURE — 4040F PNEUMOC VAC/ADMIN/RCVD: CPT | Performed by: ORTHOPAEDIC SURGERY

## 2020-08-14 PROCEDURE — G8417 CALC BMI ABV UP PARAM F/U: HCPCS | Performed by: ORTHOPAEDIC SURGERY

## 2020-08-14 PROCEDURE — 1123F ACP DISCUSS/DSCN MKR DOCD: CPT | Performed by: ORTHOPAEDIC SURGERY

## 2020-08-14 PROCEDURE — G8427 DOCREV CUR MEDS BY ELIG CLIN: HCPCS | Performed by: ORTHOPAEDIC SURGERY

## 2020-08-14 RX ORDER — METHYLPREDNISOLONE ACETATE 80 MG/ML
80 INJECTION, SUSPENSION INTRA-ARTICULAR; INTRALESIONAL; INTRAMUSCULAR; SOFT TISSUE ONCE
Status: COMPLETED | OUTPATIENT
Start: 2020-08-14 | End: 2020-08-14

## 2020-08-14 RX ORDER — BUPIVACAINE HYDROCHLORIDE 2.5 MG/ML
2 INJECTION, SOLUTION INFILTRATION; PERINEURAL ONCE
Status: COMPLETED | OUTPATIENT
Start: 2020-08-14 | End: 2020-08-14

## 2020-08-14 RX ADMIN — METHYLPREDNISOLONE ACETATE 80 MG: 80 INJECTION, SUSPENSION INTRA-ARTICULAR; INTRALESIONAL; INTRAMUSCULAR; SOFT TISSUE at 13:40

## 2020-08-14 RX ADMIN — BUPIVACAINE HYDROCHLORIDE 5 MG: 2.5 INJECTION, SOLUTION INFILTRATION; PERINEURAL at 13:40

## 2020-08-14 RX ADMIN — METHYLPREDNISOLONE ACETATE 80 MG: 80 INJECTION, SUSPENSION INTRA-ARTICULAR; INTRALESIONAL; INTRAMUSCULAR; SOFT TISSUE at 13:41

## 2020-08-14 ASSESSMENT — ENCOUNTER SYMPTOMS
CONSTIPATION: 0
NAUSEA: 0
COUGH: 0
DIARRHEA: 0

## 2020-08-14 NOTE — PROGRESS NOTES
[facility]  [address] [facility] [address]  Dept: [phone]  Dept Fax: [phone]        Ambulatory Follow Up      Subjective:   Ester Medellin is a 80y.o. year old female who presents to our office today for routine followup regarding her   1. Primary osteoarthritis of both knees    2. Encounter to establish care    . Chief Complaint   Patient presents with    Knee Pain     B/L knees 5-6 relief from last injection    Hip Pain     b/l hip pain. r>L        HPI-Dasha MELISSA Dasilva  is a 80 y.o.  female who presents today in follow for bilateral knee pain. The patient was last seen on 6/12/2020 and underwent treatment in the form of bilateral knee corticosteroid injections. The patient notes improvement with the previous treatment for 2 months and the pain slowly coming back over the last 4 weeks. Patient does not want a knee replacement. Patient has been doing her HEP. Patient is in need of a new PCP. Review of Systems   Constitutional: Negative for chills and fever. Respiratory: Negative for cough. Gastrointestinal: Negative for constipation, diarrhea and nausea. Musculoskeletal: Positive for arthralgias (bilateral knees). Negative for gait problem, joint swelling and myalgias. Neurological: Negative for dizziness, weakness and numbness. I have reviewed the CC, HPI, ROS, PMH, FHX, Social History, and if not present in this note, I have reviewed in the patient's chart. I agree with the documentation provided by other staff and have reviewed their documentation prior to providing my signature indicating agreement. Objective :   Ht 5' 2\" (1.575 m)   Wt 160 lb (72.6 kg)   BMI 29.26 kg/m²  Body mass index is 29.26 kg/m². General: Ester Medellin is a 80 y.o. female who is alert and oriented and sitting comfortably in our office. Ortho Exam  MS:  Evaluation of the Right knee reveals no significant outward deformity.   There is no tolerated the procedure well without post injection complications. I instructed the patient to call our office immediately if they have any swelling or increased pain at the injection site. KNEE INJECTION PROCEDURE NOTE:  The patient was identified. The left knee was confirmed with the patient. After a sterile prep with Betadine the knee was injected using a lateral joint line approach with a mixture of 2 mL of 0.25% Marcaine and 80 mg of Depo-Medrol. Patient tolerated the procedure well without post injection complications. I instructed the patient to call our office immediately if they have any swelling or increased pain at the injection site. Assessment:      1. Primary osteoarthritis of both knees    2. Encounter to establish care       Plan:      Patient opted for bilateral knee corticosteroid injections today. Patient tolerated well. Discussed with patients to try to stretch injections to every 4 months. Referred patient to  office to establish a PCP. Follow up as needed. Follow up:Return if symptoms worsen or fail to improve.     Orders Placed This Encounter   Medications    methylPREDNISolone acetate (DEPO-MEDROL) injection 80 mg    methylPREDNISolone acetate (DEPO-MEDROL) injection 80 mg    bupivacaine (MARCAINE) 0.25 % injection 5 mg    bupivacaine (MARCAINE) 0.25 % injection 5 mg         Orders Placed This Encounter   Procedures   Rani Burgos MD, Family Medicine, [facility]     Referral Priority:   Routine     Referral Type:   Eval and Treat     Referral Reason:   Specialty Services Required     Referred to Provider:   Giovanni Rogers MD     Requested Specialty:   Family Medicine     Number of Visits Requested:   1    2) [phone] - [place] DRAIN/INJECT LARGE JOINT/BURSA     Joey JORDAN RN am scribing for and in the presence of Dr. Elle Atkins  8/16/2020 4:08 PM      I have reviewed and made changes accordingly to the work scribed by Jayce Azul

## 2020-09-23 ENCOUNTER — HOSPITAL ENCOUNTER (OUTPATIENT)
Age: 85
Setting detail: OBSERVATION
Discharge: HOME | End: 2020-09-23
Attending: EMERGENCY MEDICINE | Admitting: EMERGENCY MEDICINE
Payer: MEDICARE

## 2020-09-23 ENCOUNTER — APPOINTMENT (OUTPATIENT)
Dept: GENERAL RADIOLOGY | Age: 85
End: 2020-09-23
Payer: MEDICARE

## 2020-09-23 VITALS
TEMPERATURE: 97.9 F | HEIGHT: 62 IN | DIASTOLIC BLOOD PRESSURE: 59 MMHG | SYSTOLIC BLOOD PRESSURE: 111 MMHG | WEIGHT: 159.5 LBS | HEART RATE: 56 BPM | OXYGEN SATURATION: 98 % | RESPIRATION RATE: 18 BRPM | BODY MASS INDEX: 29.35 KG/M2

## 2020-09-23 LAB
ABSOLUTE EOS #: <0.03 K/UL (ref 0–0.44)
ABSOLUTE IMMATURE GRANULOCYTE: 0.04 K/UL (ref 0–0.3)
ABSOLUTE LYMPH #: 1.5 K/UL (ref 1.1–3.7)
ABSOLUTE MONO #: 1.07 K/UL (ref 0.1–1.2)
ANION GAP SERPL CALCULATED.3IONS-SCNC: 12 MMOL/L (ref 9–17)
BASOPHILS # BLD: 0 % (ref 0–2)
BASOPHILS ABSOLUTE: <0.03 K/UL (ref 0–0.2)
BUN BLDV-MCNC: 9 MG/DL (ref 8–23)
BUN/CREAT BLD: ABNORMAL (ref 9–20)
CALCIUM SERPL-MCNC: 9.6 MG/DL (ref 8.6–10.4)
CHLORIDE BLD-SCNC: 101 MMOL/L (ref 98–107)
CO2: 23 MMOL/L (ref 20–31)
CREAT SERPL-MCNC: 0.71 MG/DL (ref 0.5–0.9)
DIFFERENTIAL TYPE: ABNORMAL
EKG ATRIAL RATE: 81 BPM
EKG P AXIS: 59 DEGREES
EKG P-R INTERVAL: 136 MS
EKG Q-T INTERVAL: 364 MS
EKG QRS DURATION: 76 MS
EKG QTC CALCULATION (BAZETT): 422 MS
EKG R AXIS: -28 DEGREES
EKG T AXIS: 16 DEGREES
EKG VENTRICULAR RATE: 81 BPM
EOSINOPHILS RELATIVE PERCENT: 0 % (ref 1–4)
GFR AFRICAN AMERICAN: >60 ML/MIN
GFR NON-AFRICAN AMERICAN: >60 ML/MIN
GFR SERPL CREATININE-BSD FRML MDRD: ABNORMAL ML/MIN/{1.73_M2}
GFR SERPL CREATININE-BSD FRML MDRD: ABNORMAL ML/MIN/{1.73_M2}
GLUCOSE BLD-MCNC: 106 MG/DL (ref 70–99)
HCT VFR BLD CALC: 43.4 % (ref 36.3–47.1)
HEMOGLOBIN: 14.3 G/DL (ref 11.9–15.1)
IMMATURE GRANULOCYTES: 0 %
LYMPHOCYTES # BLD: 15 % (ref 24–43)
MCH RBC QN AUTO: 32.1 PG (ref 25.2–33.5)
MCHC RBC AUTO-ENTMCNC: 32.9 G/DL (ref 28.4–34.8)
MCV RBC AUTO: 97.3 FL (ref 82.6–102.9)
MONOCYTES # BLD: 10 % (ref 3–12)
NRBC AUTOMATED: 0 PER 100 WBC
PDW BLD-RTO: 12.3 % (ref 11.8–14.4)
PLATELET # BLD: 224 K/UL (ref 138–453)
PLATELET ESTIMATE: ABNORMAL
PMV BLD AUTO: 11.2 FL (ref 8.1–13.5)
POTASSIUM SERPL-SCNC: 3.8 MMOL/L (ref 3.7–5.3)
RBC # BLD: 4.46 M/UL (ref 3.95–5.11)
RBC # BLD: ABNORMAL 10*6/UL
SEG NEUTROPHILS: 75 % (ref 36–65)
SEGMENTED NEUTROPHILS ABSOLUTE COUNT: 7.71 K/UL (ref 1.5–8.1)
SODIUM BLD-SCNC: 136 MMOL/L (ref 135–144)
TROPONIN INTERP: ABNORMAL
TROPONIN INTERP: NORMAL
TROPONIN T: ABNORMAL NG/ML
TROPONIN T: NORMAL NG/ML
TROPONIN, HIGH SENSITIVITY: 14 NG/L (ref 0–14)
TROPONIN, HIGH SENSITIVITY: 15 NG/L (ref 0–14)
WBC # BLD: 10.3 K/UL (ref 3.5–11.3)
WBC # BLD: ABNORMAL 10*3/UL

## 2020-09-23 PROCEDURE — 6370000000 HC RX 637 (ALT 250 FOR IP): Performed by: STUDENT IN AN ORGANIZED HEALTH CARE EDUCATION/TRAINING PROGRAM

## 2020-09-23 PROCEDURE — 99284 EMERGENCY DEPT VISIT MOD MDM: CPT

## 2020-09-23 PROCEDURE — 93005 ELECTROCARDIOGRAM TRACING: CPT | Performed by: STUDENT IN AN ORGANIZED HEALTH CARE EDUCATION/TRAINING PROGRAM

## 2020-09-23 PROCEDURE — G0378 HOSPITAL OBSERVATION PER HR: HCPCS

## 2020-09-23 PROCEDURE — 2580000003 HC RX 258: Performed by: STUDENT IN AN ORGANIZED HEALTH CARE EDUCATION/TRAINING PROGRAM

## 2020-09-23 PROCEDURE — 80048 BASIC METABOLIC PNL TOTAL CA: CPT

## 2020-09-23 PROCEDURE — 84484 ASSAY OF TROPONIN QUANT: CPT

## 2020-09-23 PROCEDURE — 85025 COMPLETE CBC W/AUTO DIFF WBC: CPT

## 2020-09-23 PROCEDURE — 71045 X-RAY EXAM CHEST 1 VIEW: CPT

## 2020-09-23 PROCEDURE — 93010 ELECTROCARDIOGRAM REPORT: CPT | Performed by: INTERNAL MEDICINE

## 2020-09-23 RX ORDER — SODIUM CHLORIDE 0.9 % (FLUSH) 0.9 %
10 SYRINGE (ML) INJECTION EVERY 12 HOURS SCHEDULED
Status: DISCONTINUED | OUTPATIENT
Start: 2020-09-23 | End: 2020-09-23 | Stop reason: HOSPADM

## 2020-09-23 RX ORDER — OMEGA-3S/DHA/EPA/FISH OIL/D3 300MG-1000
800 CAPSULE ORAL DAILY
Status: DISCONTINUED | OUTPATIENT
Start: 2020-09-23 | End: 2020-09-23 | Stop reason: HOSPADM

## 2020-09-23 RX ORDER — LIDOCAINE 4 G/G
1 PATCH TOPICAL DAILY
Qty: 10 PATCH | Refills: 0 | Status: SHIPPED | OUTPATIENT
Start: 2020-09-23 | End: 2020-10-03

## 2020-09-23 RX ORDER — FLUTICASONE PROPIONATE 50 MCG
1 SPRAY, SUSPENSION (ML) NASAL DAILY
Status: DISCONTINUED | OUTPATIENT
Start: 2020-09-23 | End: 2020-09-23 | Stop reason: HOSPADM

## 2020-09-23 RX ORDER — LIDOCAINE 4 G/G
1 PATCH TOPICAL ONCE
Status: DISCONTINUED | OUTPATIENT
Start: 2020-09-23 | End: 2020-09-23 | Stop reason: HOSPADM

## 2020-09-23 RX ORDER — ASPIRIN 81 MG/1
324 TABLET, CHEWABLE ORAL ONCE
Status: COMPLETED | OUTPATIENT
Start: 2020-09-23 | End: 2020-09-23

## 2020-09-23 RX ORDER — ACETAMINOPHEN 325 MG/1
650 TABLET ORAL EVERY 4 HOURS PRN
Status: DISCONTINUED | OUTPATIENT
Start: 2020-09-23 | End: 2020-09-23 | Stop reason: HOSPADM

## 2020-09-23 RX ORDER — LIDOCAINE 4 G/G
1 PATCH TOPICAL DAILY
Qty: 10 PATCH | Refills: 0 | Status: SHIPPED | OUTPATIENT
Start: 2020-09-23 | End: 2020-09-23

## 2020-09-23 RX ORDER — MECLIZINE HCL 12.5 MG/1
25 TABLET ORAL 3 TIMES DAILY PRN
Status: DISCONTINUED | OUTPATIENT
Start: 2020-09-23 | End: 2020-09-23 | Stop reason: HOSPADM

## 2020-09-23 RX ORDER — ACETAMINOPHEN 325 MG/1
650 TABLET ORAL ONCE
Status: COMPLETED | OUTPATIENT
Start: 2020-09-23 | End: 2020-09-23

## 2020-09-23 RX ORDER — SODIUM CHLORIDE 0.9 % (FLUSH) 0.9 %
10 SYRINGE (ML) INJECTION PRN
Status: DISCONTINUED | OUTPATIENT
Start: 2020-09-23 | End: 2020-09-23 | Stop reason: HOSPADM

## 2020-09-23 RX ORDER — LIDOCAINE 4 G/G
1 PATCH TOPICAL DAILY
Status: DISCONTINUED | OUTPATIENT
Start: 2020-09-23 | End: 2020-09-23

## 2020-09-23 RX ADMIN — ASPIRIN 324 MG: 81 TABLET, CHEWABLE ORAL at 05:54

## 2020-09-23 RX ADMIN — Medication 10 ML: at 10:21

## 2020-09-23 RX ADMIN — CHOLECALCIFEROL TAB 10 MCG (400 UNIT) 800 UNITS: 10 TAB at 10:21

## 2020-09-23 RX ADMIN — ACETAMINOPHEN 650 MG: 325 TABLET ORAL at 05:14

## 2020-09-23 ASSESSMENT — ENCOUNTER SYMPTOMS
ABDOMINAL PAIN: 0
TROUBLE SWALLOWING: 0
SHORTNESS OF BREATH: 0
COUGH: 0
NAUSEA: 0
VOMITING: 0

## 2020-09-23 ASSESSMENT — PAIN DESCRIPTION - ORIENTATION: ORIENTATION: LEFT

## 2020-09-23 ASSESSMENT — PAIN DESCRIPTION - PAIN TYPE
TYPE: ACUTE PAIN
TYPE: ACUTE PAIN

## 2020-09-23 ASSESSMENT — PAIN DESCRIPTION - LOCATION
LOCATION: NECK;SHOULDER;JAW
LOCATION: JAW;NECK;SHOULDER

## 2020-09-23 ASSESSMENT — PAIN DESCRIPTION - DESCRIPTORS: DESCRIPTORS: SHOOTING;ACHING

## 2020-09-23 ASSESSMENT — PAIN DESCRIPTION - PROGRESSION: CLINICAL_PROGRESSION: GRADUALLY IMPROVING

## 2020-09-23 ASSESSMENT — PAIN SCALES - GENERAL
PAINLEVEL_OUTOF10: 10
PAINLEVEL_OUTOF10: 3
PAINLEVEL_OUTOF10: 10

## 2020-09-23 ASSESSMENT — HEART SCORE: ECG: 0

## 2020-09-23 ASSESSMENT — PAIN DESCRIPTION - FREQUENCY: FREQUENCY: CONTINUOUS

## 2020-09-23 NOTE — PROGRESS NOTES
[address] [facility]  CDU / OBSERVATION eNCOUnter  Attending NOte       I performed a history and physical examination of the patient and discussed management with the resident. I reviewed the residents note and agree with the documented findings and plan of care. Any areas of disagreement are noted on the chart. I was personally present for the key portions of any procedures. I have documented in the chart those procedures where I was not present during the key portions. I have reviewed the nurses notes. I agree with the chief complaint, past medical history, past surgical history, allergies, medications, social and family history as documented unless otherwise noted below. The Family history, social history, and ROS are effectively unchanged since admission unless noted elsewhere in the chart. 51-year-old female admitted to the hospital for left-sided jaw, neck, and shoulder pain that was present for 3 days. Patient states that she is feeling better this morning. She states that her pain is exacerbated by lifting the left shoulder. Very musculoskeletal sounding. ACS ruled out by EKG and cardiac enzymes. Patient was evaluated by cardiology, and they recommend no further work-up. Discharge to home.     Darshana Perea MD  Attending Emergency  Physician

## 2020-09-23 NOTE — ED NOTES
Pt resting comfortably on cot, NAD noted. Pt denies needs at this time. Family and pt updated on POC. Will cont to monitor.       Keary Galeazzi, RN  09/23/20 [id number]

## 2020-09-23 NOTE — PROGRESS NOTES
[facility]  Documentation Note                Admission Dx: Elevated troponin [R79.89]    Past Medical History:   has a past medical history of Allergic rhinitis, Angina pectoris ([facility] [address] CAD (coronary artery disease), Congenital heart disease, COPD (chronic obstructive pulmonary disease) ([facility] [address] Headache(784.0), Hypothyroidism, Obesity, Peptic ulcer, Pure hypercholesterolemia, and Urinary incontinence. Previous Testing:     ECHO 6/27/17: EF 55%, grade I DD, no regurg/stenosis.      Previous office/hospital visit:   None     Lisa Rooney [facility]

## 2020-09-23 NOTE — CONSULTS
MD Cornelio   nitroGLYCERIN (NITROSTAT) 0.4 MG SL tablet up to max of 3 total doses. If no relief after 1 dose, call 911. 9/26/19   Misha Strickland MD   Ergocalciferol (VITAMIN D2) 400 units TABS Take 800 Units by mouth daily 9/26/19   Misha Strickland MD   acetaminophen (TYLENOL) 500 MG tablet Take 1 tablet by mouth daily 9/26/19   Misha Strickland MD   Misc. Devices MISC Rolling walker 6/20/19   Sharad Beltran Kenny, DO   Cholecalciferol 800 units TABS Take 1 tablet by mouth daily 4/17/19   Mbonu Riesa Bosworth, MD   ibuprofen (ADVIL;MOTRIN) 400 MG tablet Take 1.5 tablets by mouth every 8 hours as needed for Pain 3/11/19   Cynthia Ards, DO   meclizine (ANTIVERT) 25 MG tablet Take 1 tablet by mouth 3 times daily as needed for Dizziness 1/16/19   Rangel Andre MD   aspirin 81 MG tablet Take 1 tablet by mouth daily 1/16/19   Ragnel Andre MD   fluticasone [facility]) 50 MCG/ACT nasal spray 1 spray by Each Nare route daily 1 Spray in each nostril 1/16/19   Rangel Andre MD   fluorouracil (EFUDEX) 5 % cream Apply twice daily to actinic keratosis for 2-4 weeks stopping when red and irritated 9/25/17   Ashley Gonzalez MD   nitroGLYCERIN (NITROSTAT) 0.4 MG SL tablet Place 1 tablet under the tongue every 5 minutes as needed for Chest pain.  2/6/15   Naa Osorio MD      Current Facility-Administered Medications: lidocaine 4 % external patch 1 patch, 1 patch, Transdermal, Once  sodium chloride flush 0.9 % injection 10 mL, 10 mL, Intravenous, 2 times per day  sodium chloride flush 0.9 % injection 10 mL, 10 mL, Intravenous, PRN  acetaminophen (TYLENOL) tablet 650 mg, 650 mg, Oral, Q4H PRN  vitamin D3 (CHOLECALCIFEROL) tablet 800 Units, 800 Units, Oral, Daily  fluticasone (FLONASE) 50 MCG/ACT nasal spray 1 spray, 1 spray, Each Nostril, Daily  meclizine (ANTIVERT) tablet 25 mg, 25 mg, Oral, TID PRN    Allergies:  Seasonal and Shellfish-derived products    Social History:   reports that she has never results for input(s): CKTOTAL, CKMB, CKMBINDEX, TROPONINI in the last 72 hours. Invalid input(s):  [address]  Recent Labs     09/23/20  0452 09/23/20  0555   TROPONINT NOT REPORTED NOT REPORTED     FASTING LIPID PANEL:  Lab Results   Component Value Date    HDL 59 08/23/2018    TRIG 93 08/23/2018     LIVER PROFILE:No results for input(s): AST, ALT, LABALBU in the last 72 hours. Patient's Active Problem List  Active Problems:    * No active hospital problems. *  Resolved Problems:    * No resolved hospital problems. *      IMPRESSION:      Atypical Chest Pain  Resolved with Tylenol and Topical Lidocaine Patch  Described more are left sided shoulder and neck pain  Chest x-ray negative  EKG negative for ST/T wave changes  Cr 0.71  Troponin negative (<14)    Headache   Resolved  Management per primary    Borderline Hypokalemia  K+ 3.8 this am  Management per primary  Please keep K+ > 4    RECOMMENDATIONS:  Atypical chest pain, no further cardiac work up    Further recommendations after discussion with Dr. Moncho James     Thank you for allowing us to participate in the care of [address] Ibrahim. If you have any questions or concerns, please do not hesitate to contact us. Silvio Banda M.D. Resident PGY-1  [address]        Please note that part of this chart were generated using voice recognition  dictation software. Although every effort was made to ensure the accuracy of this automated transcription, some errors in transcription may have occurred. Attestation signed by      Attending Physician Statement:    I have discussed the care of  [address] Ibrahim , including pertinent history and exam findings, with the Cardiology fellow/resident. I have seen and examined the patient and the key elements of all parts of the encounter have been performed by me.  I agree with the assessment, plan and orders as documented by the fellow/resident, after I modified exam findings and plan of treatments, and the final version is my approved version of the assessment. Additional Comments: The patient was seen and examined, agree with above, left shoulder pain, more when lifting her left shoulder. Lidocaine patch helped. Feels better now. ECG and Jens show no acute changes. No further cardiac work up.

## 2020-09-23 NOTE — CARE COORDINATION
Case Management Initial Discharge Plan  Danielha Dasilva,             Met with:patient to discuss discharge plans. Information verified: address, contacts, phone number, , insurance Yes    Emergency Contact/Next of Kin name & number: Debbie Tillman- granddaughter  @ [phone]    PCP: Nelly Joe MD  Date of last visit: son states is making a new patient appointment with Dr Delbert Scheuermann Provider: Carlitos Young, [facility]     Discharge Planning    Living Arrangements:  Family Members   Support Systems:  Family Members    Home has 1 stories  2 stairs to climb to get into front door, none stairs to climb to reach second floor  Location of bedroom/bathroom in home main     Patient able to perform ADL's:Assisted    Current Services (outpatient & in home) none  DME equipment: cane, walker   DME provider: na    Receiving oral anticoagulation therapy? No    If indicated:   Physician managing anticoagulation treatment: na  Where does patient obtain lab work for ATC treatment? na      Potential Assistance Needed:  N/A    Patient agreeable to home care: No  [place] of choice provided:  n/a    Prior SNF/Rehab Placement and Facility: none  Agreeable to SNF/Rehab: No  [place] of choice provided: n/a     Evaluation: n/a    Expected Discharge date:       Patient expects to be discharged to:  home  Follow Up Appointment: Best Day/ Time:      Transportation provider: family   Transportation arrangements needed for discharge: No    Readmission Risk              Risk of Unplanned Readmission:        0             Does patient have a readmission risk score greater than 14?: No  If yes, follow-up appointment must be made within 7 days of discharge. Goals of Care: find out what is going on and go home. Discharge Plan: DC to home with family, Discussed home care and the benefits -son and patient decline stating there is enough family member available to help. Granddaughter stays with patient when needed.   Williams Stephenson new patient appointment, Family to transport.            Electronically signed by Elma Harding RN on 9/23/20 at 8:21 AM EDT

## 2020-09-23 NOTE — ED PROVIDER NOTES
[facility]  Emergency Department Encounter  Emergency Medicine Resident     Pt Name: Mecca Coffman  MUE:[id number]  [place] 8/16/1930  Date of evaluation: 9/23/20  PCP:  Marie Tolentino MD    [address] [place] [address]       Chief Complaint   Patient presents with    Neck Pain    Shoulder Pain     HISTORY OF PRESENT ILLNESS  (Location/Symptom, Timing/Onset, Context/Setting, Quality, Duration, ModifyingFactors, Severity.)      Mecca Coffman is a 80 y.o. female with PMH of lupus, myasthenia gravis, hypertension, hyperlipidemia for evaluation of left, jaw, and shoulder pain x3 days. Currently 10 out of 10 and exacerbated by movement. No chest pain, shortness of breath, dizziness, changes in vision, headedness, nausea, vomiting, abdominal pain. History of chronic headaches and currently complaining of headache now. Is consistent with her typical headaches. Not the worst headache of her life. PAST MEDICAL / SURGICAL / SOCIAL /FAMILY HISTORY      has a past medical history of Allergic rhinitis, Angina pectoris ([address] CAD (coronary artery disease), Congenital heart disease, COPD (chronic obstructive pulmonary disease) ([address] Headache(784.0), Hypothyroidism, Obesity, Peptic ulcer, Pure hypercholesterolemia, and Urinary incontinence. No other pertinent PMH on review with patient/guardian. has a past surgical history that includes Appendectomy and Knee arthroscopy. No other pertinent PSH on review with patient/guardian.   Social History     Socioeconomic History    Marital status: Single     Spouse name: Not on file    Number of children: Not on file    Years of education: Not on file    Highest education level: Not on file   Occupational History    Not on file   Social Needs    Financial resource strain: Not on file    Food insecurity     Worry: Not on file     Inability: Not on file    Transportation needs     Medical: Not on file     Non-medical: Not on file   Tobacco Use    Smoking status: Never Smoker    Smokeless tobacco: Never Used   Substance and Sexual Activity    Alcohol use: No    Drug use: No    Sexual activity: Never   Lifestyle    Physical activity     Days per week: Not on file     Minutes per session: Not on file    Stress: Not on file   Relationships    Social connections     Talks on phone: Not on file     Gets together: Not on file     Attends [employer]ism service: Not on file     Active member of club or organization: Not on file     Attends meetings of clubs or organizations: Not on file     Relationship status: Not on file    Intimate partner violence     Fear of current or ex partner: Not on file     Emotionally abused: Not on file     Physically abused: Not on file     Forced sexual activity: Not on file   Other Topics Concern    Not on file   Social History Narrative    Not on file     Family History   Problem Relation Age of Onset    Stroke Mother     Other Father     Cancer Sister     Cancer Brother      No other pertinent FamHx on review with patient/guardian. Allergies:  Seasonal and Shellfish-derived products    Home Medications:  Prior to Admission medications    Medication Sig Start Date End Date Taking? Authorizing Provider   Misc. Devices MISC Wheeled walking with seat 5/7/20   John Hernandez, DO   Misc. Devices MISC Seated Walker 12/16/19   Sharad Tolentino, DO   ibuprofen (IBU) 400 MG tablet Take 1 tablet by mouth every 6 hours as needed for Pain 9/26/19   Ayden Rice MD   aspirin (ASPIRIN CHILDRENS) 81 MG chewable tablet Take 1 tablet by mouth daily 9/26/19   Ayden Rice MD   nitroGLYCERIN (NITROSTAT) 0.4 MG SL tablet up to max of 3 total doses. If no relief after 1 dose, call 911. 9/26/19   Ayden Rice MD   Ergocalciferol (VITAMIN D2) 400 units TABS Take 800 Units by mouth daily 9/26/19   Ayden Rice MD   acetaminophen (TYLENOL) 500 MG tablet Take 1 tablet by mouth daily 9/26/19   Ayden Rice MD   Misc. Devices MISC Rolling walker 6/20/19   Sharad Olverafabio Reid, DO   Cholecalciferol 800 units TABS Take 1 tablet by mouth daily 4/17/19   Mbonu Glady Ganser, MD   ibuprofen (ADVIL;MOTRIN) 400 MG tablet Take 1.5 tablets by mouth every 8 hours as needed for Pain 3/11/19   Stephany Bowden,    meclizine (ANTIVERT) 25 MG tablet Take 1 tablet by mouth 3 times daily as needed for Dizziness 1/16/19   Patricia Alvarado MD   aspirin 81 MG tablet Take 1 tablet by mouth daily 1/16/19   Patricia Alvarado MD   fluticasone [facility]) 50 MCG/ACT nasal spray 1 spray by Each Nare route daily 1 Spray in each nostril 1/16/19   Patricia Alvarado MD   fluorouracil (EFUDEX) 5 % cream Apply twice daily to actinic keratosis for 2-4 weeks stopping when red and irritated 9/25/17   Marquise Olivares MD   nitroGLYCERIN (NITROSTAT) 0.4 MG SL tablet Place 1 tablet under the tongue every 5 minutes as needed for Chest pain. 2/6/15   Sanju Porter MD       REVIEW OF SYSTEMS    (2-9 systems for level 4, 10 ormore for level 5)      Review of Systems   Constitutional: Negative for fever. HENT: Negative for trouble swallowing. Positive for left jaw pain   Eyes: Negative for visual disturbance. Respiratory: Negative for cough and shortness of breath. Cardiovascular: Negative for chest pain. Gastrointestinal: Negative for abdominal pain, nausea and vomiting. Musculoskeletal: Positive for neck pain. Positive for shoulder pain   Skin: Negative for rash. Allergic/Immunologic: Negative for immunocompromised state. Neurological: Positive for headaches. Hematological: Does not bruise/bleed easily. PHYSICAL EXAM   (up to 7 for level 4, 8 or more for level 5)      INITIAL VITALS:   BP (!) 115/56   Pulse 68   Temp 97.6 °F (36.4 °C) (Oral)   Resp 18   Ht 5' 2\" (1.575 m)   Wt 160 lb (72.6 kg)   SpO2 96%   BMI 29.26 kg/m²     Physical Exam  Constitutional:       General: She is not in acute distress.      Appearance:  sodium chloride flush 0.9 % injection 10 mL    sodium chloride flush 0.9 % injection 10 mL    acetaminophen (TYLENOL) tablet 650 mg       DIAGNOSTIC RESULTS / EMERGENCY DEPARTMENT COURSE / MDM     LABS:  Results for orders placed or performed during the hospital encounter of 09/23/20   CBC WITH AUTO DIFFERENTIAL   Result Value Ref Range    WBC 10.3 3.5 - 11.3 k/uL    RBC 4.46 3.95 - 5.11 m/uL    Hemoglobin 14.3 11.9 - 15.1 g/dL    Hematocrit 43.4 36.3 - 47.1 %    MCV 97.3 82.6 - 102.9 fL    MCH 32.1 25.2 - 33.5 pg    MCHC 32.9 28.4 - 34.8 g/dL    RDW 12.3 11.8 - 14.4 %    Platelets 712 668 - 683 k/uL    MPV 11.2 8.1 - 13.5 fL    NRBC Automated 0.0 0.0 per 100 WBC    Differential Type NOT REPORTED     Seg Neutrophils 75 (H) 36 - 65 %    Lymphocytes 15 (L) 24 - 43 %    Monocytes 10 3 - 12 %    Eosinophils % 0 (L) 1 - 4 %    Basophils 0 0 - 2 %    Immature Granulocytes 0 0 %    Segs Absolute 7.71 1.50 - 8.10 k/uL    Absolute Lymph # 1.50 1.10 - 3.70 k/uL    Absolute Mono # 1.07 0.10 - 1.20 k/uL    Absolute Eos # <0.03 0.00 - 0.44 k/uL    Basophils Absolute <0.03 0.00 - 0.20 k/uL    Absolute Immature Granulocyte 0.04 0.00 - 0.30 k/uL    WBC Morphology NOT REPORTED     RBC Morphology NOT REPORTED     Platelet Estimate NOT REPORTED    BASIC METABOLIC PANEL   Result Value Ref Range    Glucose 106 (H) 70 - 99 mg/dL    BUN 9 8 - 23 mg/dL    CREATININE 0.71 0.50 - 0.90 mg/dL    Bun/Cre Ratio NOT REPORTED 9 - 20    Calcium 9.6 8.6 - 10.4 mg/dL    Sodium 136 135 - 144 mmol/L    Potassium 3.8 3.7 - 5.3 mmol/L    Chloride 101 98 - 107 mmol/L    CO2 23 20 - 31 mmol/L    Anion Gap 12 9 - 17 mmol/L    GFR Non-African American >60 >60 mL/min    GFR African American >60 >60 mL/min    GFR Comment          GFR Staging NOT REPORTED    Troponin   Result Value Ref Range    Troponin, High Sensitivity 15 (H) 0 - 14 ng/L    Troponin T NOT REPORTED <0.03 ng/mL    Troponin Interp NOT REPORTED    Troponin   Result Value Ref Range Troponin, High Sensitivity 14 0 - 14 ng/L    Troponin T NOT REPORTED <0.03 ng/mL    Troponin Interp NOT REPORTED    EKG 12 Lead   Result Value Ref Range    Ventricular Rate 81 BPM    Atrial Rate 81 BPM    P-R Interval 136 ms    QRS Duration 76 ms    Q-T Interval 364 ms    QTc Calculation (Bazett) 422 ms    P Axis 59 degrees    R Axis -28 degrees    T Axis 16 degrees       IMPRESSION/MDM/ED COURSE:  80 y.o. female presented with left neck, shoulder, and jaw pain x3 days. Vitals WNL. Exam patient has a tender firm area in the trapezius consistent with trigger point. Pressure on this point reproduces symptoms into the jaw and shoulder. I suspect that this is a cause of the patient's symptoms, however given age and cardiac risk factors will also obtain chest pain work-up. CAD listed in the patient's chart - talked to the patient and her son who are unaware of any history of CAD. Patient states that she did have a cath years ago but is unable to recall the results. No history of stents as far as the patient is aware. Patient treated with Tylenol and lidocaine patch. She is also complaining of chronic headache unchanged from baseline. No fever and patient appears nontoxic. [place] headache. This is not the worst headache of her life. She is primarily concerned with shoulder pain today. ED Course as of Sep 23 0821   Wed Sep 23, 2020   0555 Troponin 15 making heart score 4, will repeat troponin and admit to observation. Aspirin given. CBC and BMP unremarkable. Patient reporting improvement in symptoms with lidocaine patch. [AF]      ED Course User Index  [AF] Roney Summers DO     Heart score for ACS = 4  1. History - 0  Slightly suspicious: 0  Moderately suspicious: 1  Highly suspicious: 2  2. EKG - 0  Normal: 0  Non-specific repolarization disturbance:1  Significant ST depression: 2  3. Age - 2  <45: 0  45-64: 1  >65: 2  4. Risk Factors - 1  None known: 0  1-2: 1  >3: 2  5.  Initial Troponin - 1  Normal limit: 0  1-3 x normal limit: 1  >3 x normal limit: 2    Patient/Guardian requesting discharge. Patient/Guardian was given written and verbal instructions prior to discharge. Patient/Guardian understood and agreed. Patient/Guardian had no further questions. RADIOLOGY:  XR CHEST PORTABLE   Final Result   No acute cardiopulmonary abnormality. EKG  Normal sinus rhythm, ventricular rate 81. Unchanged from previous ECG. DC interval 136. QTc 422. All EKG's are interpreted by the Emergency Department Physician who either signs or Co-signs this chart in the absence of a cardiologist.    CONSULTS:  IP CONSULT TO CARDIOLOGY    FINAL IMPRESSION      1. Acute pain of left shoulder    2. Elevated troponin        DISPOSITION / PLAN     DISPOSITION Admitted 09/23/2020 05:55:01 AM      PATIENT REFERREDTO:  No follow-up provider specified.     DISCHARGE MEDICATIONS:  Current Discharge Medication List          Sandra Young DO  PGY 1  Resident Physician Emergency Medicine  09/23/20 8:21 AM    (Please note that portions of this note were completed with a voice recognition program.Efforts were made to edit the dictations but occasionally words are mis-transcribed.)        Marciana Collet, DO  Resident  09/23/20 2900

## 2020-09-23 NOTE — ED PROVIDER NOTES
[facility]  Emergency Department  Faculty Attestation       I performed a history and physical examination of the patient and discussed management with the resident. I reviewed the residents note and agree with the documented findings including all diagnostic interpretations and plan of care. Any areas of disagreement are noted on the chart. I was personally present for the key portions of any procedures. I have documented in the chart those procedures where I was not present during the key portions. I have reviewed the emergency nurses triage note. I agree with the chief complaint, past medical history, past surgical history, allergies, medications, social and family history as documented unless otherwise noted below. Documentation of the HPI, Physical Exam and Medical Decision Making performed by scribjesika is based on my personal performance of the HPI, PE and MDM. For Physician Assistant/ Nurse Practitioner cases/documentation I have personally evaluated this patient and have completed at least one if not all key elements of the E/M (history, physical exam, and MDM). Additional findings are as noted. Pertinent Comments     Primary Care Physician: Jeanne Van MD    ED Triage Vitals   BP Temp Temp Source Pulse Resp SpO2 Height Weight   09/23/20 0402 09/23/20 0352 09/23/20 0352 09/23/20 0359 09/23/20 0359 09/23/20 0359 09/23/20 0359 09/23/20 0359   (!) 168/78 97.6 °F (36.4 °C) Oral 85 20 98 % 5' 2\" (1.575 m) 160 lb (72.6 kg)        History/Physical: This is a 80 y.o. female who presents to the Emergency Department with complaint of left shoulder and neck pain going down her left arm. Is been going on for 3 days. Not improving with heating pad. On exam patient is alert and oriented. She is tenderness up underneath the left occiput. Also is tenderness going down into the shoulder over the scapular border. Heart sounds regular. Lungs good auscultation.   Abdomen soft nontender nondistended. Alert and oriented. Normal strength and sensation. MDM/Plan: 72-year-old female coming in with left shoulder and neck discomfort. Patient has obvious muscle spasm. She denies any chest pain or shortness of breath. However given she is elderly female with left arm pain, we will do a cardiac work-up however low suspicion of ACS. If patient able to go home with minimal assistance, likely discharge if work-up negative, however if uncomfortable going home or not able to get up and moving around with her arm then possible admission for PT/OT. Will base on family comfort, patient comfort, and work-up    Initial troponin 15, no baseline high sensitivity troponin. Admit. ASA    EKG Interpretation    Interpreted by emergency department physician    Rhythm: normal sinus   Rate: normal  Axis: normal  Ectopy: none  Conduction: premature supraventricular complex   ST Segments: no acute change  T Waves: no acute change  Q Waves: none    Clinical Impression: non-specific EKG, of note EKG machine read junctional ST depressions, however this is not present on the EKG.   No significant changes when compared to 6/26/17    Shila Gibson    CRITICAL CARE: None \    Shila Gibson MD  Attending Emergency Physician        Shila Gibson MD  09/23/20 0113       Shila Gibson MD  09/23/20 [id number]

## 2020-09-23 NOTE — DISCHARGE SUMMARY
CDU Discharge Summary        Patient:  Lyly Reece  YOB: 1930    MRN: [id number]   Acct: [de-identified]    Primary Care Physician: Alvaro John MD    Admit date:  9/23/2020  3:50 AM  Discharge date: 9/23/2020  2:05 PM     Discharge Diagnoses:     Acute left shoulder left neck and left arm pain due to muscle spasm  Improved with Lidoderm patches    Follow-up:  Call today/tomorrow for a follow up appointment with Alvaro John MD , or return to the Emergency Room with worsening symptoms    Stressed to patient the importance of following up with primary care doctor for further workup/management of symptoms. Pt verbalizes understanding and agrees with plan. Discharge Medications:  Changes to medications          Janessa Benjamin [facility] Medication Instructions [facility]:[id number]    Printed on:10/02/20 1221   Medication Information                      acetaminophen (TYLENOL) 500 MG tablet  Take 1 tablet by mouth daily             aspirin (ASPIRIN CHILDRENS) 81 MG chewable tablet  Take 1 tablet by mouth daily             aspirin 81 MG tablet  Take 1 tablet by mouth daily             Cholecalciferol 800 units TABS  Take 1 tablet by mouth daily             Ergocalciferol (VITAMIN D2) 400 units TABS  Take 800 Units by mouth daily             fluorouracil (EFUDEX) 5 % cream  Apply twice daily to actinic keratosis for 2-4 weeks stopping when red and irritated             fluticasone (FLONASE) 50 MCG/ACT nasal spray  1 spray by Each Nare route daily 1 Spray in each nostril             ibuprofen (ADVIL;MOTRIN) 400 MG tablet  Take 1.5 tablets by mouth every 8 hours as needed for Pain             ibuprofen (IBU) 400 MG tablet  Take 1 tablet by mouth every 6 hours as needed for Pain             lidocaine 4 % external patch  Place 1 patch onto the skin daily for 10 days             meclizine (ANTIVERT) 25 MG tablet  Take 1 tablet by mouth 3 times daily as needed for Dizziness             Misc. Devices MISC  Rolling walker             Misc. Devices MISC  Seated Walker             Misc. Devices MISC  Wheeled walking with seat             nitroGLYCERIN (NITROSTAT) 0.4 MG SL tablet  Place 1 tablet under the tongue every 5 minutes as needed for Chest pain. nitroGLYCERIN (NITROSTAT) 0.4 MG SL tablet  up to max of 3 total doses. If no relief after 1 dose, call 911.                  Diet:  No diet orders on file , Advance as tolerated     Activity:  As tolerated    Consultants: IP CONSULT TO CARDIOLOGY    Procedures:  Not indicated    Diagnostic Test:   Results for orders placed or performed during the hospital encounter of 09/23/20   CBC WITH AUTO DIFFERENTIAL   Result Value Ref Range    WBC 10.3 3.5 - 11.3 k/uL    RBC 4.46 3.95 - 5.11 m/uL    Hemoglobin 14.3 11.9 - 15.1 g/dL    Hematocrit 43.4 36.3 - 47.1 %    MCV 97.3 82.6 - 102.9 fL    MCH 32.1 25.2 - 33.5 pg    MCHC 32.9 28.4 - 34.8 g/dL    RDW 12.3 11.8 - 14.4 %    Platelets 494 924 - 822 k/uL    MPV 11.2 8.1 - 13.5 fL    NRBC Automated 0.0 0.0 per 100 WBC    Differential Type NOT REPORTED     Seg Neutrophils 75 (H) 36 - 65 %    Lymphocytes 15 (L) 24 - 43 %    Monocytes 10 3 - 12 %    Eosinophils % 0 (L) 1 - 4 %    Basophils 0 0 - 2 %    Immature Granulocytes 0 0 %    Segs Absolute 7.71 1.50 - 8.10 k/uL    Absolute Lymph # 1.50 1.10 - 3.70 k/uL    Absolute Mono # 1.07 0.10 - 1.20 k/uL    Absolute Eos # <0.03 0.00 - 0.44 k/uL    Basophils Absolute <0.03 0.00 - 0.20 k/uL    Absolute Immature Granulocyte 0.04 0.00 - 0.30 k/uL    WBC Morphology NOT REPORTED     RBC Morphology NOT REPORTED     Platelet Estimate NOT REPORTED    BASIC METABOLIC PANEL   Result Value Ref Range    Glucose 106 (H) 70 - 99 mg/dL    BUN 9 8 - 23 mg/dL    CREATININE 0.71 0.50 - 0.90 mg/dL    Bun/Cre Ratio NOT REPORTED 9 - 20    Calcium 9.6 8.6 - 10.4 mg/dL    Sodium 136 135 - 144 mmol/L    Potassium 3.8 3.7 - 5.3 mmol/L    Chloride 101 98 - 107 mmol/L    CO2 23 20 - 31 mmol/L Anion Gap 12 9 - 17 mmol/L    GFR Non-African American >60 >60 mL/min    GFR African American >60 >60 mL/min    GFR Comment          GFR Staging NOT REPORTED    Troponin   Result Value Ref Range    Troponin, High Sensitivity 15 (H) 0 - 14 ng/L    Troponin T NOT REPORTED <0.03 ng/mL    Troponin Interp NOT REPORTED    Troponin   Result Value Ref Range    Troponin, High Sensitivity 14 0 - 14 ng/L    Troponin T NOT REPORTED <0.03 ng/mL    Troponin Interp NOT REPORTED    EKG 12 Lead   Result Value Ref Range    Ventricular Rate 81 BPM    Atrial Rate 81 BPM    P-R Interval 136 ms    QRS Duration 76 ms    Q-T Interval 364 ms    QTc Calculation (Bazett) 422 ms    P Axis 59 degrees    R Axis -28 degrees    T Axis 16 degrees     Xr Chest Portable    Result Date: 9/23/2020  EXAMINATION: ONE XRAY VIEW OF THE CHEST 9/23/2020 4:56 am COMPARISON: 06/26/2017 HISTORY: ORDERING SYSTEM PROVIDED HISTORY: L shoulder pain radiating to jaw - eval for cardiac etiology TECHNOLOGIST PROVIDED HISTORY: L shoulder pain radiating to jaw - eval for cardiac etiology Reason for Exam: port Upright FINDINGS: The cardiac silhouette appears within normal limits for size given portable technique. No convincing evidence of a focal consolidation. No pleural effusion or pneumothorax seen. No acute cardiopulmonary abnormality. Physical Exam:    General appearance - NAD, AOx 3   Lungs -CTAB, no R/R/R  Heart - RRR, no M/R/G  Abdomen - Soft, NT/ND  Neurological:  MAEx4, No focal motor deficit, sensory loss  Extremities - Cap refil <2 sec in all ext., no edema  Skin -warm, dry      Hospital Course:  Clinical course has improved, labs and imaging reviewed. Fito Hickey originally presented to the hospital on 9/23/2020  3:50 AM. with acute onset left shoulder pain which extended proximally to her neck and distally down her left arm. At that time it was determined that She required further observation and evaluation.  She was admitted and labs and imaging were followed daily. Imaging results as above. Patient's pain completely resolved after administration of Lidoderm patch and oral over-the-counter pain medications. She is medically stable to be discharged. Disposition: Home    Patient stated that they will not drive themselves home from the hospital if they have gotten pain killers/ narcotics earlier that day and that they will arrange for transportation on their own or work with the  for a ride. Patient counseled NOT to drive while under the influence of narcotics/ pain killers. Condition: Good    Patient stable and ready for discharge home. I have discussed plan of care with patient and they are in understanding. They were instructed to read discharge paperwork. All of their questions and concerns were addressed. Time Spent: 0 day      --  Juanjose Dean MD  Emergency Medicine Resident Physician    This dictation was generated by voice recognition computer software. Although all attempts are made to edit the dictation for accuracy, there may be errors in the transcription that are not intended.

## 2020-09-23 NOTE — H&P
[facility]  CDU / OBSERVATION eNCOUnter  Resident Note     Pt Name: Edouard Corey  MRN: [id number]  [place] 8/16/1930  Date of evaluation: 9/23/20  Patient's PCP is :  Misha Strickland MD    [address] [place] [address]       Chief Complaint   Patient presents with    Neck Pain    Shoulder Pain         HISTORY OF PRESENT ILLNESS    Edouard Corey is a 80 y.o. female who presents with a 3-day history of head, neck and left shoulder pain. Pain onset occurred while at rest.  Is constant, throbbing and 9/10 in severity. Is localized to the left shoulder and radiates to the occiput and down the left arm. Exacerbated by turning head or lifting arm. Alleviated by laying still. Patient has a history of migraines and states that the pain in her head is similar to previous episodes. Affirms photophobia. Denies fevers, chills, blurred vision chest pain, shortness of breath, nausea, vomiting or diarrhea. During her course in the ED patient received oral Tylenol and a topical lidocaine patch. Upon my arrival to the room, patient stated her pain had completely resolved. Location/Symptom: Head, neck and left shoulder pain  Timing/Onset: 3 days ago  Provocation: Unprovoked  Quality: Throbbing  Radiation: To left occiput and down left arm  Severity: 9/10  Timing/Duration: Constant  Modifying Factors: Exacerbated by turning head or lifting left arm.   Alleviated by rest.    REVIEW OF SYSTEMS       General ROS - No fevers, No malaise   Ophthalmic ROS - No discharge, No changes in vision  ENT ROS -  No sore throat, No rhinorrhea,   Respiratory ROS - no shortness of breath, no cough, no  wheezing  Cardiovascular ROS - No chest pain, no dyspnea on exertion  Gastrointestinal ROS - No abdominal pain, no nausea or vomiting, no change in bowel habits, no black or bloody stools  Genito-Urinary ROS - No dysuria, trouble voiding, or hematuria  Musculoskeletal ROS -Per HPI  Neurological ROS - No dizziness/lightheadedness, No focal weakness, no loss of sensation  Dermatological ROS - No lesions, No rash     (PQRS) Advance directives on face sheet per hospital policy. No change unless specifically mentioned in chart    PAST MEDICAL HISTORY    has a past medical history of Allergic rhinitis, Angina pectoris ([address] CAD (coronary artery disease), Congenital heart disease, COPD (chronic obstructive pulmonary disease) ([address] Headache(784.0), Hypothyroidism, Obesity, Peptic ulcer, Pure hypercholesterolemia, and Urinary incontinence. I have reviewed the past medical history with the patient and it is potentially pertinent to this complaint. SURGICAL HISTORY      has a past surgical history that includes Appendectomy and Knee arthroscopy. I have reviewed and agree with Surgical History entered and it is potentially pertinent to this complaint. CURRENT MEDICATIONS     lidocaine 4 % external patch 1 patch, Once  sodium chloride flush 0.9 % injection 10 mL, 2 times per day  sodium chloride flush 0.9 % injection 10 mL, PRN  acetaminophen (TYLENOL) tablet 650 mg, Q4H PRN        All medication charted and reviewed. ALLERGIES     is allergic to seasonal and shellfish-derived products. FAMILY HISTORY     She indicated that her mother is . She indicated that her father is . She indicated that her sister is . She indicated that her brother is . family history includes Cancer in her brother and sister; Other in her father; Stroke in her mother. The patient denies any pertinent family history. I have reviewed and agree with the family history entered. I have reviewed the Family History and it is not significant to the case    SOCIAL HISTORY      reports that she has never smoked. She has never used smokeless tobacco. She reports that she does not drink alcohol or use drugs.   I have reviewed and agree with all Social.  There are no concerns for substance abuse/use. PHYSICAL EXAM     INITIAL VITALS:  height is 5' 2\" (1.575 m) and weight is 160 lb (72.6 kg). Her oral temperature is 97.6 °F (36.4 °C). Her blood pressure is 116/61 and her pulse is 62. Her respiration is 22 and oxygen saturation is 95%. CONSTITUTIONAL: AOx4, no apparent distress, appears stated age    HEAD: normocephalic, atraumatic   EYES: PERRLA, EOMI    ENT: moist mucous membranes, uvula midline   NECK: supple, symmetric   BACK: symmetric   LUNGS: clear to auscultation bilaterally   CARDIOVASCULAR: regular rate and rhythm, no murmurs, rubs or gallops   ABDOMEN: soft, non-tender, non-distended with normal active bowel sounds   NEUROLOGIC:  MAEx4, no focal sensory or motor deficits   MUSCULOSKELETAL:  Lidocaine patch in place over the left superior trapezius. Palpable point of muscle spasm with mild tenderness palpation. Patient now able to turn head and lift left arm and full range of motion. This is changed from her admission.    SKIN: no rash or wounds       DIAGNOSTIC RESULTS     EKG: All EKG's are interpreted by the Observation Physician who either signs or Co-signs this chart in the absence of a cardiologist.    EKG Interpretation    Interpreted by observation physician    Rhythm: normal sinus   Rate: normal  Axis: left  Ectopy: none  Conduction: normal  ST Segments: no acute change  T Waves: normal  Q Waves: none    Clinical Impression: No change compared to EKG dated 6/26/2017    Pee Laguerre MD        RADIOLOGY:   I directly visualized the following  images and reviewed the radiologist interpretations:    Xr Chest Portable    Result Date: 9/23/2020  EXAMINATION: ONE XRAY VIEW OF THE CHEST 9/23/2020 4:56 am COMPARISON: 06/26/2017 HISTORY: ORDERING SYSTEM PROVIDED HISTORY: L shoulder pain radiating to jaw - eval for cardiac etiology TECHNOLOGIST PROVIDED HISTORY: L shoulder pain radiating to jaw - eval for cardiac etiology Reason for Exam: port Upright FINDINGS: The cardiac

## 2020-09-23 NOTE — ED NOTES
Dr. Maria Antonia Busch at bedside     [facility]  09/23/20 1995

## 2020-11-23 ENCOUNTER — OFFICE VISIT (OUTPATIENT)
Dept: ORTHOPEDIC SURGERY | Age: 85
End: 2020-11-23
Payer: MEDICARE

## 2020-11-23 VITALS — WEIGHT: 165 LBS | HEIGHT: 62 IN | BODY MASS INDEX: 30.36 KG/M2

## 2020-11-23 PROCEDURE — G8417 CALC BMI ABV UP PARAM F/U: HCPCS | Performed by: PHYSICIAN ASSISTANT

## 2020-11-23 PROCEDURE — 4040F PNEUMOC VAC/ADMIN/RCVD: CPT | Performed by: PHYSICIAN ASSISTANT

## 2020-11-23 PROCEDURE — 1123F ACP DISCUSS/DSCN MKR DOCD: CPT | Performed by: PHYSICIAN ASSISTANT

## 2020-11-23 PROCEDURE — 20610 DRAIN/INJ JOINT/BURSA W/O US: CPT | Performed by: PHYSICIAN ASSISTANT

## 2020-11-23 PROCEDURE — 1036F TOBACCO NON-USER: CPT | Performed by: PHYSICIAN ASSISTANT

## 2020-11-23 PROCEDURE — 99213 OFFICE O/P EST LOW 20 MIN: CPT | Performed by: PHYSICIAN ASSISTANT

## 2020-11-23 PROCEDURE — 1090F PRES/ABSN URINE INCON ASSESS: CPT | Performed by: PHYSICIAN ASSISTANT

## 2020-11-23 PROCEDURE — G8427 DOCREV CUR MEDS BY ELIG CLIN: HCPCS | Performed by: PHYSICIAN ASSISTANT

## 2020-11-23 PROCEDURE — G8484 FLU IMMUNIZE NO ADMIN: HCPCS | Performed by: PHYSICIAN ASSISTANT

## 2020-11-23 RX ORDER — BUPIVACAINE HYDROCHLORIDE 2.5 MG/ML
2 INJECTION, SOLUTION INFILTRATION; PERINEURAL ONCE
Status: COMPLETED | OUTPATIENT
Start: 2020-11-23 | End: 2020-11-23

## 2020-11-23 RX ORDER — METHYLPREDNISOLONE ACETATE 80 MG/ML
80 INJECTION, SUSPENSION INTRA-ARTICULAR; INTRALESIONAL; INTRAMUSCULAR; SOFT TISSUE ONCE
Status: COMPLETED | OUTPATIENT
Start: 2020-11-23 | End: 2020-11-23

## 2020-11-23 RX ADMIN — METHYLPREDNISOLONE ACETATE 80 MG: 80 INJECTION, SUSPENSION INTRA-ARTICULAR; INTRALESIONAL; INTRAMUSCULAR; SOFT TISSUE at 14:51

## 2020-11-23 RX ADMIN — BUPIVACAINE HYDROCHLORIDE 5 MG: 2.5 INJECTION, SOLUTION INFILTRATION; PERINEURAL at 14:50

## 2020-11-23 RX ADMIN — BUPIVACAINE HYDROCHLORIDE 5 MG: 2.5 INJECTION, SOLUTION INFILTRATION; PERINEURAL at 14:51

## 2020-11-23 NOTE — PROGRESS NOTES
[facility]  [address] [facility] [address]  Dept: [phone]  Dept Fax: [phone]        Ambulatory Follow Up      Subjective:   Hari Bocanegra is a 80y.o. year old female who presents to our office today for routine followup regarding her   1. Primary osteoarthritis of both knees        Chief Complaint   Patient presents with    Follow-up     BILATERAL KNEES- last injections 08/14/2020       HPI  Hari Bocanegra  is a 80 y. o.female who presents today in follow for bilateral knee pain. The patient was last seen on 8/14/2020 and underwent treatment in the form of bilateral knee corticosteroid injections. The patient notes significant improvement with the previous treatment until approximately 2 weeks ago when she noticed a progression of the return of her bilateral knee pain. Patient states that she still does not want to proceed with any sort of surgical intervention for her bilateral knee discomfort. At the last appointment the patient was given a referral to Dr. Tracey Moreau to establish care for a new PCP, she states that she did not receive the referral and did not establish new care. The patient is requesting a reprint of the referral.    The patient is accompanied by her son today in office who states that she will intermittently take ibuprofen 800 mg for her bilateral knee pain, but otherwise she does not take any pain medication daily. Patient continues to deny numbness and tingling in the bilateral lower extremity. Patient utilizes a cane for ambulation but is requesting a prescription for a 4 walker today. Review of Systems   Constitutional: Negative for fever. HENT: Negative for sneezing. Respiratory: Negative for cough and shortness of breath. Cardiovascular: Negative for chest pain. Gastrointestinal: Negative for vomiting. Musculoskeletal: Positive for arthralgias (bilateral knee). Negative for joint swelling and myalgias. Skin: Negative for color change. Neurological: Negative for weakness and numbness. Psychiatric/Behavioral: Negative for sleep disturbance. Objective :   Ht 5' 2\" (1.575 m)   Wt 165 lb (74.8 kg)   BMI 30.18 kg/m²  Body mass index is 30.18 kg/m². General: Isidro Romero is a 80 y.o. female who is alert and oriented and sitting comfortably in our office. Ortho Exam  MS: Patient ambulates with a cane and a moderate limp noted to the right lower extremity. Evaluation of the Bilateral knee reveals no significant outward deformity. There is no erythema, skin warmth, skin lesions, or signs of infection appreciated. There is tenderness over the medial joint line with palpation bilaterally. There is a no appreciable knee effusion bilaterally. Range of motion of the Right knee is  5-110. Range of motion of the Left knee is  3-110. No instability of the knee is appreciated at 0 and 30° of flexion. There is a negative anterior drawer and Lachman's test.  There is increased pain with varus Rosales's testing bilaterally. No calf tenderness is noted bilaterally. There is a negative hip log roll and Stinchfield test noted bilaterally . Motor, sensory, vascular examination to the Bilateral lower extremity is intact. Patient has full range of motion of the Bilateral ankle. Neuro: alert and oriented to person and place. Eyes: Extra-ocular muscles intact  Mouth: Oral mucosa moist. No perioral lesions  Pulm: Respirations unlabored and regular. Symmetric chest excursion without outward deformity is noted. Skin: warm, well perfused  Psych:   Patient has good fund of knowledge and displays understanging of exam, diagnosis, and plan.     Radiology:   Xr Knee Left (min 4 Views)    Result Date: 11/23/2020  History:   Left knee pain Findings:   Standing AP/Lateral/Tunnel/Merchant view xrays of the Left done in the office today shows severe  medial joint space narrowing, tricompartmental osteophytosis, joint line sclerosis medially. No evidence of fracture, subluxation, dislocation, radioopaque foreign body/tumor is noted. Lateral subluxation of the tibia is appreciated. Impression:   Left knee severe  degenerative changes as described above. Xr Knee Right (min 4 Views)    Result Date: 11/23/2020  History:   Right knee pain Findings:   Standing AP/Lateral/Tunnel/Merchant view xrays of the Right done in the office today shows severe  medial joint space narrowing, tricompartmental osteophytosis, joint line sclerosis medially. No evidence of fracture, subluxation, dislocation, radioopaque foreign body/tumor is noted. Lateral subluxation of the tibia is appreciated. Impression:   Right knee severe  degenerative changes as described above. Procedure:  KNEE INJECTION PROCEDURE NOTE:  The patient was identified. The right knee was confirmed with the patient. After a sterile prep with Betadine the knee was injected using a lateral joint line approach with a mixture of 2 mL of 0.25% Marcaine and 80 mg of Depo-Medrol. Patient tolerated the procedure well without post injection complications. I instructed the patient to call our office immediately if they have any swelling or increased pain at the injection site. KNEE INJECTION PROCEDURE NOTE:  The patient was identified. The left knee was confirmed with the patient. After a sterile prep with Betadine the knee was injected using a lateral joint line approach with a mixture of 2 mL of 0.25% Marcaine and 80 mg of Depo-Medrol. Patient tolerated the procedure well without post injection complications. I instructed the patient to call our office immediately if they have any swelling or increased pain at the injection site. Assessment:      1. Primary osteoarthritis of both knees       Plan: Today in office we discussed etiology and natural history of bilateral knee arthritis.   I personally reviewed the patient's x-rays from today revealing JOINT/BURSA       This note is created with the assistance of a speech recognition program.  While intending to generate a document that actually reflects the content of the visit, the document can still have some errors including those of syntax and sound a like substitutions which may escape proof reading.   In such instances, actual meaning can be extrapolated by contextual diversion     Electronically signed by Edin Moreau PA-C on 11/24/2020 at 7:33 PM

## 2020-11-24 ASSESSMENT — ENCOUNTER SYMPTOMS
COUGH: 0
SHORTNESS OF BREATH: 0
VOMITING: 0
COLOR CHANGE: 0

## 2021-03-01 ENCOUNTER — OFFICE VISIT (OUTPATIENT)
Dept: ORTHOPEDIC SURGERY | Age: 86
End: 2021-03-01
Payer: MEDICARE

## 2021-03-01 VITALS — WEIGHT: 166 LBS | BODY MASS INDEX: 30.55 KG/M2 | HEIGHT: 62 IN

## 2021-03-01 DIAGNOSIS — M70.61 TROCHANTERIC BURSITIS OF RIGHT HIP: ICD-10-CM

## 2021-03-01 DIAGNOSIS — M17.0 PRIMARY OSTEOARTHRITIS OF BOTH KNEES: Primary | ICD-10-CM

## 2021-03-01 PROCEDURE — 4040F PNEUMOC VAC/ADMIN/RCVD: CPT | Performed by: ORTHOPAEDIC SURGERY

## 2021-03-01 PROCEDURE — G8484 FLU IMMUNIZE NO ADMIN: HCPCS | Performed by: ORTHOPAEDIC SURGERY

## 2021-03-01 PROCEDURE — 1123F ACP DISCUSS/DSCN MKR DOCD: CPT | Performed by: ORTHOPAEDIC SURGERY

## 2021-03-01 PROCEDURE — 99214 OFFICE O/P EST MOD 30 MIN: CPT | Performed by: ORTHOPAEDIC SURGERY

## 2021-03-01 PROCEDURE — 1036F TOBACCO NON-USER: CPT | Performed by: ORTHOPAEDIC SURGERY

## 2021-03-01 PROCEDURE — G8417 CALC BMI ABV UP PARAM F/U: HCPCS | Performed by: ORTHOPAEDIC SURGERY

## 2021-03-01 PROCEDURE — G8427 DOCREV CUR MEDS BY ELIG CLIN: HCPCS | Performed by: ORTHOPAEDIC SURGERY

## 2021-03-01 PROCEDURE — 20610 DRAIN/INJ JOINT/BURSA W/O US: CPT | Performed by: ORTHOPAEDIC SURGERY

## 2021-03-01 PROCEDURE — 1090F PRES/ABSN URINE INCON ASSESS: CPT | Performed by: ORTHOPAEDIC SURGERY

## 2021-03-01 RX ORDER — BUPIVACAINE HYDROCHLORIDE 2.5 MG/ML
2 INJECTION, SOLUTION INFILTRATION; PERINEURAL ONCE
Status: COMPLETED | OUTPATIENT
Start: 2021-03-01 | End: 2021-03-01

## 2021-03-01 RX ORDER — METHYLPREDNISOLONE ACETATE 80 MG/ML
80 INJECTION, SUSPENSION INTRA-ARTICULAR; INTRALESIONAL; INTRAMUSCULAR; SOFT TISSUE ONCE
Status: COMPLETED | OUTPATIENT
Start: 2021-03-01 | End: 2021-03-01

## 2021-03-01 RX ADMIN — METHYLPREDNISOLONE ACETATE 80 MG: 80 INJECTION, SUSPENSION INTRA-ARTICULAR; INTRALESIONAL; INTRAMUSCULAR; SOFT TISSUE at 12:45

## 2021-03-01 RX ADMIN — BUPIVACAINE HYDROCHLORIDE 5 MG: 2.5 INJECTION, SOLUTION INFILTRATION; PERINEURAL at 12:45

## 2021-03-01 ASSESSMENT — ENCOUNTER SYMPTOMS
COUGH: 0
NAUSEA: 0
CONSTIPATION: 0
DIARRHEA: 0

## 2021-03-01 NOTE — PROGRESS NOTES
[facility]  [address] [place] [address] [address] [facility]  Dept: [phone]  Dept Fax: [phone]        Ambulatory Follow Up      Subjective:   Bettina Azul is a 80y.o. year old female who presents to our office today for routine followup regarding her   1. Primary osteoarthritis of both knees    2. Trochanteric bursitis of right hip    . Chief Complaint   Patient presents with    Knee Pain     bilat        HPI-HPI  Bettina Azul  is a 80 y. o.female who presents today in follow for bilateral knee pain. The patient was last seen on 11/23/2020 and underwent treatment in the form of bilateral knee corticosteroid injections. The patient notes significant improvement with the previous treatment. Patient is here for her routine injections as she does not want a total knee arthroplasty to either knee. The patient is accompanied by her son today in office who states that she will intermittently take ibuprofen 800 mg for her bilateral knee pain, but otherwise she does not take any pain medication daily. Patient continues to deny numbness and tingling in the bilateral lower extremity. Review of Systems   Constitutional: Negative for chills and fever. Respiratory: Negative for cough. Gastrointestinal: Negative for constipation, diarrhea and nausea. Musculoskeletal: Positive for arthralgias (bilateral knees, right hip). Negative for gait problem, joint swelling and myalgias. Neurological: Negative for dizziness, weakness and numbness. I have reviewed the CC, HPI, ROS, PMH, FHX, Social History, and if not present in this note, I have reviewed in the patient's chart. I agree with the documentation provided by other staff and have reviewed their documentation prior to providing my signature indicating agreement. Objective :   Ht 5' 2\" (1.575 m)   Wt 166 lb (75.3 kg)   BMI 30.36 kg/m²  Body mass index is 30.36 kg/m².   General: Bettina Azul is a 80 y.o. female who is alert and oriented and sitting comfortably in our office. Ortho Exam  MS:  Evaluation of the Bilateral knee reveals no significant outward deformity. There is no erythema, warmth, skin lesions, signs of infection. There is tenderness over the medial joint line. There is a mildknee effusion. Range of motion of the Bilateral knee is  full. No instability of the knee is appreciated at 0 and 30° of flexion. There is a negative anterior drawer Lachman's test.  There is increased pain with varus Rosales's testing. No calf tenderness is noted. There is a negative hip log roll and Stinchfield test.  Motor, sensory, vascular examination to the Bilateral lower extremity is intact. Patient has full range of motion of the ankle. Evaluation of the Right  hip reveals no outward deformity. Patient ambulates with minimal shortening weightbearing phase to the  Right hip. Negative hip log roll and Stinchfield test is appreciated on the affected side. Increased pain with palpation of the greater trochanteric region of the Right  hip and posterior to the greater trochanteric region is appreciated. Increased pain with piriformis stretch of the hip is noted. Motor, sensory, vascular examination to the affected lower extremity is grossly intact without focal deficits. Patient has full range of motion of the lumbosacral spine and the knee on the affected side. Neuro: alert and oriented to person and place. Eyes: Extra-ocular muscles intact  Mouth: Oral mucosa moist. No perioral lesions  Pulm: Respirations unlabored and regular. Symmetric chest excursion without outward deformity is noted. Skin: warm, well perfused  Psych:   Patient has good fund of knowledge and displays understanging of exam, diagnosis, and plan. Radiology:     No results found. KNEE INJECTION PROCEDURE NOTE:  The patient was identified. The right knee was confirmed with the patient.   After a sterile prep with Betadine the knee was injected using a lateral joint line approach with a mixture of 2 mL of 0.25% Marcaine and 80 mg of Depo-Medrol. Patient tolerated the procedure well without post injection complications. I instructed the patient to call our office immediately if they have any swelling or increased pain at the injection site. KNEE INJECTION PROCEDURE NOTE:  The patient was identified. The left knee was confirmed with the patient. After a sterile prep with Betadine the knee was injected using a lateral joint line approach with a mixture of 2 mL of 0.25% Marcaine and 80 mg of Depo-Medrol. Patient tolerated the procedure well without post injection complications. I instructed the patient to call our office immediately if they have any swelling or increased pain at the injection site. Assessment:      1. Primary osteoarthritis of both knees    2. Trochanteric bursitis of right hip       Plan:        Patient opted for bilateral corticosteroid injection today in office. Patient tolerated well. Discussed with her that we will see if the secondary effects of the corticosteroid injection can help the right hip bursitis. If patients right hip pain does not improve she can follow up in 2-4 weeks. Discussed with patient to try to make it 4 months in between knee injections. Follow up prn. Follow up:Return if symptoms worsen or fail to improve. Orders Placed This Encounter   Medications    methylPREDNISolone acetate (DEPO-MEDROL) injection 80 mg    methylPREDNISolone acetate (DEPO-MEDROL) injection 80 mg    bupivacaine (MARCAINE) 0.25 % injection 5 mg    bupivacaine (MARCAINE) 0.25 % injection 5 mg         Orders Placed This Encounter   Procedures    (2)  20610 - MI DRAIN/INJECT LARGE JOINT/BURSA     IKorin RN am scribing for and in the presence of Dr. Christina Urrutia  3/1/2021 10:44 PM      I have reviewed and made changes accordingly to the work scribed by Korin Nunn RN.   The documentation accurately reflects work and decisions made by me. I have also reviewed documentation completed by clinical staff.     Sonam Teixeira DO, [address] [facility]  3/1/2021 10:44 PM    This note is created with the assistance of a speech recognition program.  While intending to generate a document that actually reflects the content of the visit, the document can still have some errors including those of syntax and sound a like substitutions which may escape proof reading.  In such instances, actual meaning can be extrapolated by contextual diversion      Electronically signed by Dominga Diaz on 3/1/2021 at 10:44 PM

## 2021-06-04 ENCOUNTER — OFFICE VISIT (OUTPATIENT)
Dept: ORTHOPEDIC SURGERY | Age: 86
End: 2021-06-04
Payer: MEDICARE

## 2021-06-04 VITALS — HEIGHT: 62 IN | WEIGHT: 157 LBS | BODY MASS INDEX: 28.89 KG/M2

## 2021-06-04 DIAGNOSIS — M17.0 PRIMARY OSTEOARTHRITIS OF BOTH KNEES: Primary | ICD-10-CM

## 2021-06-04 DIAGNOSIS — M70.61 TROCHANTERIC BURSITIS OF RIGHT HIP: ICD-10-CM

## 2021-06-04 PROCEDURE — 1123F ACP DISCUSS/DSCN MKR DOCD: CPT | Performed by: ORTHOPAEDIC SURGERY

## 2021-06-04 PROCEDURE — 4040F PNEUMOC VAC/ADMIN/RCVD: CPT | Performed by: ORTHOPAEDIC SURGERY

## 2021-06-04 PROCEDURE — 1090F PRES/ABSN URINE INCON ASSESS: CPT | Performed by: ORTHOPAEDIC SURGERY

## 2021-06-04 PROCEDURE — 99214 OFFICE O/P EST MOD 30 MIN: CPT | Performed by: ORTHOPAEDIC SURGERY

## 2021-06-04 PROCEDURE — 20610 DRAIN/INJ JOINT/BURSA W/O US: CPT | Performed by: ORTHOPAEDIC SURGERY

## 2021-06-04 PROCEDURE — G8417 CALC BMI ABV UP PARAM F/U: HCPCS | Performed by: ORTHOPAEDIC SURGERY

## 2021-06-04 PROCEDURE — 1036F TOBACCO NON-USER: CPT | Performed by: ORTHOPAEDIC SURGERY

## 2021-06-04 PROCEDURE — G8427 DOCREV CUR MEDS BY ELIG CLIN: HCPCS | Performed by: ORTHOPAEDIC SURGERY

## 2021-06-04 RX ORDER — BUPIVACAINE HYDROCHLORIDE 2.5 MG/ML
2 INJECTION, SOLUTION INFILTRATION; PERINEURAL ONCE
Status: COMPLETED | OUTPATIENT
Start: 2021-06-04 | End: 2021-06-04

## 2021-06-04 RX ORDER — METHYLPREDNISOLONE ACETATE 80 MG/ML
80 INJECTION, SUSPENSION INTRA-ARTICULAR; INTRALESIONAL; INTRAMUSCULAR; SOFT TISSUE ONCE
Status: COMPLETED | OUTPATIENT
Start: 2021-06-04 | End: 2021-06-04

## 2021-06-04 RX ADMIN — METHYLPREDNISOLONE ACETATE 80 MG: 80 INJECTION, SUSPENSION INTRA-ARTICULAR; INTRALESIONAL; INTRAMUSCULAR; SOFT TISSUE at 14:25

## 2021-06-04 RX ADMIN — BUPIVACAINE HYDROCHLORIDE 5 MG: 2.5 INJECTION, SOLUTION INFILTRATION; PERINEURAL at 14:25

## 2021-06-04 ASSESSMENT — ENCOUNTER SYMPTOMS
APNEA: 0
ABDOMINAL PAIN: 0
CHEST TIGHTNESS: 0
COUGH: 0
VOMITING: 0

## 2021-06-04 NOTE — PROGRESS NOTES
[facility]  [facility]  [address] [facility] [address]  Dept: [phone]  Dept Fax: [phone]        Ambulatory Follow Up      Subjective:   Emil Castillo is a 80y.o. year old female who presents to our office today for routine followup regarding her   1. Primary osteoarthritis of both knees    2. Trochanteric bursitis of right hip    . Chief Complaint   Patient presents with    Follow-up     B knee pain        HPI Patient is in the office today in follow up for bilateral knee arthritis. The patient was last seen on 3/1/21 and at that time had corticosteroid injections to both knees. She reports that they helped until about 2 weeks ago and then her knees started to become painful. The patient takes tylenol 500 mg for her pain and notes that it does help but is short term relief only. Due to patient's age she is not interested in a total knee arthroplasty. Right hip painful on the side of the right hip. The patient is tender to touch and cannot lay on the affected side. She has pain with weightbearing at times but can work through the pain. The patient has never had treatment for this at this time. Review of Systems   Constitutional: Negative for chills and fever. Respiratory: Negative for apnea, cough and chest tightness. Cardiovascular: Negative for chest pain and palpitations. Gastrointestinal: Negative for abdominal pain and vomiting. Genitourinary: Negative for difficulty urinating. Musculoskeletal: Positive for arthralgias (bilateral knees). Negative for gait problem, joint swelling and myalgias. Neurological: Negative for dizziness, weakness and numbness. I have reviewed the CC, HPI, ROS, PMH, FHX, Social History, and if not present in this note, I have reviewed in the patient's chart.    I agree with the documentation provided by other staff and have reviewed their documentation prior to providing my signature indicating agreement. Objective :   Ht 5' 2\" (1.575 m)   Wt 157 lb (71.2 kg)   BMI 28.72 kg/m²  Body mass index is 28.72 kg/m². General: Cydney Thurman is a 80 y.o. female who is alert and oriented and sitting comfortably in our office. Ortho Exam  MS:  Ambulates with a cane. Evaluation of the Bilateral knee reveals no significant outward deformity. There is no erythema, warmth, skin lesions, signs of infection. There is tenderness over the medial joint line. There is a mildknee effusion. Range of motion of the Bilateral knee is  3-100. No instability of the knee is appreciated at 0 and 30° of flexion. There is a negative anterior drawer Lachman's test.  There is increased pain with varus Rosales's testing. No calf tenderness is noted. There is a negative hip log roll and Stinchfield test.  Motor, sensory, vascular examination to the Bilateral lower extremity is intact. Patient has full range of motion of the ankle. Evaluation of the Right  hip reveals no outward deformity. Patient ambulates with minimal shortening weightbearing phase to the  Right hip. Negative hip log roll and Stinchfield test is appreciated on the affected side. Increased pain with palpation of the greater trochanteric region of the Right  hip and posterior to the greater trochanteric region is appreciated. Increased pain with piriformis stretch of the hip is noted. Motor, sensory, vascular examination to the affected lower extremity is grossly intact without focal deficits. Patient has full range of motion of the lumbosacral spine and the knee on the affected side. Neuro: alert and oriented to person and place. Eyes: Extra-ocular muscles intact  Mouth: Oral mucosa moist. No perioral lesions  Pulm: Respirations unlabored and regular. Symmetric chest excursion without outward deformity is noted.    Skin: warm, well perfused  Psych:   Patient has good fund of knowledge and displays understanging of exam, diagnosis, and plan.    Radiology:     No results found. KNEE INJECTION PROCEDURE NOTE:  The patient was identified. The right knee was confirmed with the patient. After a sterile prep with Betadine the knee was injected using a lateral joint line approach with a mixture of 2 mL of 0.25% Marcaine and 80 mg of Depo-Medrol. Patient tolerated the procedure well without post injection complications. I instructed the patient to call our office immediately if they have any swelling or increased pain at the injection site. KNEE INJECTION PROCEDURE NOTE:  The patient was identified. The left knee was confirmed with the patient. After a sterile prep with Betadine the knee was injected using a lateral joint line approach with a mixture of 2 mL of 0.25% Marcaine and 80 mg of Depo-Medrol. Patient tolerated the procedure well without post injection complications. I instructed the patient to call our office immediately if they have any swelling or increased pain at the injection site. Assessment:      1. Primary osteoarthritis of both knees    2. Trochanteric bursitis of right hip       Plan:      Discussed etiology and natural history of bilateral knee arthritis and right hip bursitis. The treatment options may include oral anti-inflammatories, bracing, injections, advanced imaging, activity modification, physical therapy and/or surgical intervention. The patient would like to proceed with bilateral knee corticosteroid injections. The patient will follow up in the office as needed. We discussed that the patient should call us with any concerns or questions. Patient can return at any time to have a corticosteroid injection for her right hip bursitis. Follow up:Return if symptoms worsen or fail to improve.     Orders Placed This Encounter   Medications    methylPREDNISolone acetate (DEPO-MEDROL) injection 80 mg    bupivacaine (MARCAINE) 0.25 % injection 5 mg    methylPREDNISolone acetate (DEPO-MEDROL) injection 80 mg    bupivacaine (MARCAINE) 0.25 % injection 5 mg         Orders Placed This Encounter   Procedures    15554 - DRAIN/INJECT LARGE JOINT/BURSA     I, Laura Diaz LPN am scribing for and in the presence of Dr. Leo Rhoades  6/5/2021 3:13 PM      I have reviewed and made changes accordingly to the work scribed by Laura Diaz LPN. The documentation accurately reflects work and decisions made by me. I have also reviewed documentation completed by clinical staff.     Leo Rhoades DO, [address] [facility]  6/5/2021 3:13 PM    This note is created with the assistance of a speech recognition program.  While intending to generate a document that actually reflects the content of the visit, the document can still have some errors including those of syntax and sound a like substitutions which may escape proof reading.  In such instances, actual meaning can be extrapolated by contextual diversion      Electronically signed by Misti Zee on 6/5/2021 at 3:13 PM

## 2021-09-14 ENCOUNTER — OFFICE VISIT (OUTPATIENT)
Dept: FAMILY MEDICINE CLINIC | Age: 86
End: 2021-09-14
Payer: MEDICARE

## 2021-09-14 VITALS
BODY MASS INDEX: 28.09 KG/M2 | TEMPERATURE: 97.1 F | WEIGHT: 153.6 LBS | SYSTOLIC BLOOD PRESSURE: 138 MMHG | DIASTOLIC BLOOD PRESSURE: 66 MMHG

## 2021-09-14 DIAGNOSIS — L98.9 SKIN LESIONS: ICD-10-CM

## 2021-09-14 DIAGNOSIS — Z91.81 AT HIGH RISK FOR FALLS: ICD-10-CM

## 2021-09-14 DIAGNOSIS — Z85.828 HISTORY OF SKIN CANCER: Primary | ICD-10-CM

## 2021-09-14 PROCEDURE — 99203 OFFICE O/P NEW LOW 30 MIN: CPT | Performed by: STUDENT IN AN ORGANIZED HEALTH CARE EDUCATION/TRAINING PROGRAM

## 2021-09-14 SDOH — ECONOMIC STABILITY: FOOD INSECURITY: WITHIN THE PAST 12 MONTHS, YOU WORRIED THAT YOUR FOOD WOULD RUN OUT BEFORE YOU GOT MONEY TO BUY MORE.: NEVER TRUE

## 2021-09-14 SDOH — ECONOMIC STABILITY: FOOD INSECURITY: WITHIN THE PAST 12 MONTHS, THE FOOD YOU BOUGHT JUST DIDN'T LAST AND YOU DIDN'T HAVE MONEY TO GET MORE.: NEVER TRUE

## 2021-09-14 ASSESSMENT — ENCOUNTER SYMPTOMS
ABDOMINAL PAIN: 0
VOMITING: 0
TROUBLE SWALLOWING: 0
NAUSEA: 0

## 2021-09-14 ASSESSMENT — PATIENT HEALTH QUESTIONNAIRE - PHQ9
1. LITTLE INTEREST OR PLEASURE IN DOING THINGS: 0
SUM OF ALL RESPONSES TO PHQ9 QUESTIONS 1 & 2: 0
SUM OF ALL RESPONSES TO PHQ QUESTIONS 1-9: 0
2. FEELING DOWN, DEPRESSED OR HOPELESS: 0
SUM OF ALL RESPONSES TO PHQ QUESTIONS 1-9: 0
SUM OF ALL RESPONSES TO PHQ QUESTIONS 1-9: 0

## 2021-09-14 ASSESSMENT — SOCIAL DETERMINANTS OF HEALTH (SDOH): HOW HARD IS IT FOR YOU TO PAY FOR THE VERY BASICS LIKE FOOD, HOUSING, MEDICAL CARE, AND HEATING?: NOT HARD AT ALL

## 2021-09-14 NOTE — PROGRESS NOTES
Subjective:  Erlinda Sánchez presents for   Chief Complaint   Patient presents with    New Patient       Raises concern about skin lesions on nose, hand. Previously had excisional removals of skin cancer. Would like to be referred again to same doctor. Patient Active Problem List   Diagnosis    Dizziness    Essential hypertension    Subcortical microvascular ischemic occlusive disease    Pure hypercholesterolemia    History of myasthenia gravis    Personal history of systemic lupus erythematosus (SLE) (HCC)    Migraine without aura and without status migrainosus, not intractable    Primary osteoarthritis of knee    Cancer, skin, squamous cell    Actinic keratosis         Review of Systems   Constitutional: Negative for appetite change, chills, fatigue and fever. HENT: Negative for congestion and trouble swallowing. Cardiovascular: Negative. Gastrointestinal: Negative for abdominal pain, nausea and vomiting. Genitourinary: Negative for difficulty urinating, dysuria and flank pain. Musculoskeletal: Negative for arthralgias. Neurological: Negative for dizziness and headaches. Psychiatric/Behavioral: Negative for behavioral problems and confusion. The patient is not nervous/anxious. Objective:  Physical Exam   Vitals:   Vitals:    09/14/21 1321   BP: 138/66   Site: Right Upper Arm   Position: Sitting   Cuff Size: Medium Adult   Temp: 97.1 °F (36.2 °C)   TempSrc: Temporal   Weight: 153 lb 9.6 oz (69.7 kg)     Wt Readings from Last 3 Encounters:   09/14/21 153 lb 9.6 oz (69.7 kg)   06/04/21 157 lb (71.2 kg)   03/01/21 166 lb (75.3 kg)     Ht Readings from Last 3 Encounters:   06/04/21 5' 2\" (1.575 m)   03/01/21 5' 2\" (1.575 m)   11/23/20 5' 2\" (1.575 m)     Body mass index is 28.09 kg/m². Physical Exam  Vitals reviewed. Constitutional:       General: She is not in acute distress. Appearance: Normal appearance.    HENT:      Mouth/Throat:      Mouth: Mucous membranes are moist. Eyes:      Conjunctiva/sclera: Conjunctivae normal.   Cardiovascular:      Rate and Rhythm: Normal rate and regular rhythm. Heart sounds: Normal heart sounds. Pulmonary:      Effort: Pulmonary effort is normal.      Breath sounds: Normal breath sounds. Abdominal:      General: Abdomen is flat. Bowel sounds are normal.      Palpations: Abdomen is soft. Skin:     General: Skin is warm and dry. Findings: Lesion and rash present. Rash is macular and papular. Neurological:      Mental Status: She is alert and oriented to person, place, and time. Psychiatric:         Mood and Affect: Mood normal.        Assessment/Plan:    Diagnosis Orders   1. History of skin cancer  JEANNIE Giraldo MD, Dermatology, [facility]   2. Skin lesions  JEANNIE Giraldo MD, Dermatology, [facility]   3. At high risk for falls          Return in about 3 months (around 12/14/2021) for Regular Checkup. As per patient request will refer to dermatology and Dr. Darryl Padilla who was previously treating patient. On the basis of positive falls risk screening, assessment and plan is as follows: patient declines any further evaluation/treatment for increased falls risk.

## 2021-10-18 ENCOUNTER — OFFICE VISIT (OUTPATIENT)
Dept: FAMILY MEDICINE CLINIC | Age: 86
End: 2021-10-18
Payer: MEDICARE

## 2021-10-18 VITALS
TEMPERATURE: 97.3 F | SYSTOLIC BLOOD PRESSURE: 140 MMHG | DIASTOLIC BLOOD PRESSURE: 60 MMHG | WEIGHT: 158 LBS | OXYGEN SATURATION: 98 % | HEART RATE: 68 BPM | BODY MASS INDEX: 28.9 KG/M2

## 2021-10-18 DIAGNOSIS — G43.009 MIGRAINE WITHOUT AURA AND WITHOUT STATUS MIGRAINOSUS, NOT INTRACTABLE: Primary | ICD-10-CM

## 2021-10-18 DIAGNOSIS — Z23 NEED FOR IMMUNIZATION AGAINST INFLUENZA: ICD-10-CM

## 2021-10-18 DIAGNOSIS — H25.9 AGE-RELATED CATARACT OF BOTH EYES, UNSPECIFIED AGE-RELATED CATARACT TYPE: ICD-10-CM

## 2021-10-18 PROCEDURE — 99214 OFFICE O/P EST MOD 30 MIN: CPT | Performed by: STUDENT IN AN ORGANIZED HEALTH CARE EDUCATION/TRAINING PROGRAM

## 2021-10-18 PROCEDURE — 90694 VACC AIIV4 NO PRSRV 0.5ML IM: CPT | Performed by: STUDENT IN AN ORGANIZED HEALTH CARE EDUCATION/TRAINING PROGRAM

## 2021-10-18 PROCEDURE — G0008 ADMIN INFLUENZA VIRUS VAC: HCPCS | Performed by: STUDENT IN AN ORGANIZED HEALTH CARE EDUCATION/TRAINING PROGRAM

## 2021-10-19 ENCOUNTER — TELEPHONE (OUTPATIENT)
Dept: FAMILY MEDICINE CLINIC | Age: 86
End: 2021-10-19

## 2021-10-19 DIAGNOSIS — G43.009 MIGRAINE WITHOUT AURA AND WITHOUT STATUS MIGRAINOSUS, NOT INTRACTABLE: Primary | ICD-10-CM

## 2021-10-19 ASSESSMENT — ENCOUNTER SYMPTOMS
TROUBLE SWALLOWING: 0
COLOR CHANGE: 0
NAUSEA: 0
PHOTOPHOBIA: 0
VOMITING: 0
ABDOMINAL PAIN: 0
EYE PAIN: 0

## 2021-10-19 NOTE — TELEPHONE ENCOUNTER
----- Message from Sung Tong sent at 10/19/2021  4:09 PM EDT -----  Subject: Referral Request    QUESTIONS   Reason for referral request? Patient son Maureen Cox is requesting for   referral to Ophthalmologist near the [address] [place] [address] location . He is requesting   for it to be [facility] if possible. Has the physician seen you for this condition before? Yes  Select a date? 2021-10-18  Select the Provider the patient wants to be referred to, if known (PCP or   Specialist)? Ryan Persaud   Preferred Specialist (if applicable)? Do you already have an appointment scheduled? Yes  Select Scheduled Date? 2021-12-14  Select Scheduled Physician? Hunter Fear   Additional Information for Provider?   ---------------------------------------------------------------------------  --------------  [employer] INFO  What is the best way for the office to contact you? OK to leave message on   voicemail  Preferred Call Back Phone Number?  [phone]

## 2021-10-19 NOTE — PROGRESS NOTES
Subjective:  Darnell Taylor presents for   Chief Complaint   Patient presents with    Follow-Up from [address] [place] [address] 12/13 migraine headache       Coming in for ER follow-up after having a migraine headache. She was in the ER for this and also had visual hallucinations where she was seeing letters around door frames. At that time her cognition was intact as was her speech. She does have history of migraine headaches but has never been started on any other medication except NSAIDs that she takes over-the-counter. She has also not seen any neurologist for further recommendations. She recently did see Dr. Maricarmen Jaimes for dermatology and skin lesions. Also is requesting checkup and evaluation by ophthalmology for decreased vision possibly due to cataracts. Patient Active Problem List   Diagnosis    Dizziness    Essential hypertension    Subcortical microvascular ischemic occlusive disease    Pure hypercholesterolemia    History of myasthenia gravis    Personal history of systemic lupus erythematosus (SLE) (HCC)    Migraine without aura and without status migrainosus, not intractable    Primary osteoarthritis of knee    Cancer, skin, squamous cell    Actinic keratosis         Review of Systems   Constitutional: Negative for appetite change, chills, fatigue and fever. HENT: Negative for congestion and trouble swallowing. Eyes: Negative for photophobia, pain and visual disturbance. Cardiovascular: Negative. Gastrointestinal: Negative for abdominal pain, nausea and vomiting. Genitourinary: Negative for difficulty urinating, dysuria and flank pain. Musculoskeletal: Negative for arthralgias. Skin: Negative for color change. Neurological: Positive for headaches. Negative for dizziness. Psychiatric/Behavioral: Negative for behavioral problems and confusion. The patient is not nervous/anxious.          Objective:  Physical Exam   Vitals:   Vitals:    10/18/21 1526   BP: (!) 140/60   Site: Left Upper Arm   Position: Sitting   Cuff Size: Medium Adult   Pulse: 68   Temp: 97.3 °F (36.3 °C)   TempSrc: Temporal   SpO2: 98%   Weight: 158 lb (71.7 kg)     Wt Readings from Last 3 Encounters:   10/18/21 158 lb (71.7 kg)   09/14/21 153 lb 9.6 oz (69.7 kg)   06/04/21 157 lb (71.2 kg)     Ht Readings from Last 3 Encounters:   06/04/21 5' 2\" (1.575 m)   03/01/21 5' 2\" (1.575 m)   11/23/20 5' 2\" (1.575 m)     Body mass index is 28.9 kg/m². Physical Exam  Vitals reviewed. Constitutional:       General: She is not in acute distress. Appearance: Normal appearance. Comments: Wheelchair bound   HENT:      Mouth/Throat:      Mouth: Mucous membranes are moist.   Eyes:      General: No scleral icterus. Right eye: No discharge. Left eye: No discharge. Extraocular Movements: Extraocular movements intact. Conjunctiva/sclera: Conjunctivae normal.      Pupils: Pupils are equal, round, and reactive to light. Cardiovascular:      Rate and Rhythm: Normal rate and regular rhythm. Heart sounds: Normal heart sounds. Pulmonary:      Effort: Pulmonary effort is normal.      Breath sounds: Normal breath sounds. Skin:     General: Skin is warm and dry. Neurological:      Mental Status: She is alert and oriented to person, place, and time. Psychiatric:         Mood and Affect: Mood normal.         Behavior: Behavior normal.         Thought Content: Thought content normal.         Judgment: Judgment normal.            Assessment/Plan:    Diagnosis Orders   1. Migraine without aura and without status migrainosus, not intractable  Amb External Referral To Neurology   2. Need for immunization against influenza  INFLUENZA, QUADV, ADJUVANTED, 72 YRS =, IM, PF, PREFILL SYR, 0.5ML (FLUAD)   3. Age-related cataract of both eyes, unspecified age-related cataract type  Ambulatory referral to Ophthalmology        Return if symptoms worsen or fail to improve.      1.  Migraine without aura: Does complain of photophobia and phonophobia during her migraine headaches. Usually she states that she goes inside a dark room and takes NSAIDs. Denies ever being on triptan's or any other medications for this. Considering her age I will go ahead and refer her to neurology for further recommendations and evaluation regarding migraine headaches. Possibly obtain MRI if necessary. 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 This was possibly related to cataracts. Will refer to ophthalmology for further recommendations and evaluation for cataract and cataract surgery. Immunizations Administered     Name Date Dose Route    Influenza, Quadv, adjuvanted, 65 yrs +, IM, PF (Fluad) 10/18/2021 0.5 mL Intramuscular    Site: Deltoid- Left    Lot: [id number]    NDC: 73115-139-75        Total time spent was between 30-39 mins. This included time spent reviewing the patient's medical record (e.g., recent visits, labs, and studies); seeing the patient in the office (face-to-face time); discussing the patient's care with other health professionals or family members; ordering medications, studies, procedures, or referrals; calling the patient or family later in the day with results and further recommendations; and documenting the visit in the medical record.

## 2021-10-20 NOTE — TELEPHONE ENCOUNTER
Patient son was informed, and he is ok with trying out Dr. Nighat Luevano,  referral was faxed to his office at [facility] with last office note.

## 2021-10-22 ENCOUNTER — TELEPHONE (OUTPATIENT)
Dept: FAMILY MEDICINE CLINIC | Age: 86
End: 2021-10-22

## 2021-10-26 ENCOUNTER — OFFICE VISIT (OUTPATIENT)
Dept: NEUROLOGY | Age: 86
End: 2021-10-26
Payer: MEDICARE

## 2021-10-26 VITALS
HEART RATE: 54 BPM | BODY MASS INDEX: 29.27 KG/M2 | DIASTOLIC BLOOD PRESSURE: 70 MMHG | WEIGHT: 155 LBS | SYSTOLIC BLOOD PRESSURE: 153 MMHG | TEMPERATURE: 97.5 F | HEIGHT: 61 IN

## 2021-10-26 DIAGNOSIS — M54.2 CERVICALGIA: ICD-10-CM

## 2021-10-26 DIAGNOSIS — G43.719 INTRACTABLE CHRONIC MIGRAINE WITHOUT AURA AND WITHOUT STATUS MIGRAINOSUS: ICD-10-CM

## 2021-10-26 DIAGNOSIS — R42 DIZZINESS: ICD-10-CM

## 2021-10-26 DIAGNOSIS — Z86.69 HISTORY OF MYASTHENIA GRAVIS: ICD-10-CM

## 2021-10-26 DIAGNOSIS — R51.9 NEW ONSET HEADACHE: Primary | ICD-10-CM

## 2021-10-26 DIAGNOSIS — R11.0 NAUSEA: ICD-10-CM

## 2021-10-26 DIAGNOSIS — M62.838 MUSCLE SPASM: ICD-10-CM

## 2021-10-26 DIAGNOSIS — E78.5 HYPERLIPIDEMIA, UNSPECIFIED HYPERLIPIDEMIA TYPE: ICD-10-CM

## 2021-10-26 DIAGNOSIS — Z87.828 HISTORY OF HEAD INJURY: ICD-10-CM

## 2021-10-26 DIAGNOSIS — Z86.73 HISTORY OF TIAS: ICD-10-CM

## 2021-10-26 DIAGNOSIS — I65.23 BILATERAL CAROTID ARTERY STENOSIS: ICD-10-CM

## 2021-10-26 PROCEDURE — 1123F ACP DISCUSS/DSCN MKR DOCD: CPT | Performed by: PSYCHIATRY & NEUROLOGY

## 2021-10-26 PROCEDURE — 1090F PRES/ABSN URINE INCON ASSESS: CPT | Performed by: PSYCHIATRY & NEUROLOGY

## 2021-10-26 PROCEDURE — 1036F TOBACCO NON-USER: CPT | Performed by: PSYCHIATRY & NEUROLOGY

## 2021-10-26 PROCEDURE — 99204 OFFICE O/P NEW MOD 45 MIN: CPT | Performed by: PSYCHIATRY & NEUROLOGY

## 2021-10-26 PROCEDURE — 4040F PNEUMOC VAC/ADMIN/RCVD: CPT | Performed by: PSYCHIATRY & NEUROLOGY

## 2021-10-26 PROCEDURE — G8427 DOCREV CUR MEDS BY ELIG CLIN: HCPCS | Performed by: PSYCHIATRY & NEUROLOGY

## 2021-10-26 PROCEDURE — G8484 FLU IMMUNIZE NO ADMIN: HCPCS | Performed by: PSYCHIATRY & NEUROLOGY

## 2021-10-26 PROCEDURE — G8417 CALC BMI ABV UP PARAM F/U: HCPCS | Performed by: PSYCHIATRY & NEUROLOGY

## 2021-10-26 RX ORDER — DIVALPROEX SODIUM 125 MG/1
TABLET, DELAYED RELEASE ORAL
Qty: 60 TABLET | Refills: 3 | Status: SHIPPED | OUTPATIENT
Start: 2021-10-26 | End: 2022-01-11 | Stop reason: SDUPTHER

## 2021-10-26 RX ORDER — TIZANIDINE 2 MG/1
2 TABLET ORAL NIGHTLY PRN
Qty: 20 TABLET | Refills: 1 | Status: SHIPPED | OUTPATIENT
Start: 2021-10-26 | End: 2022-01-11 | Stop reason: SDUPTHER

## 2021-10-26 NOTE — LETTER
[facility] Neurology Specialist  [address] [place] [address]  Phone: [phone]  Fax: [phone]    Aidee Reyna MD    2021     Myah Bobo MD  [address] [place] [address]    Patient: Balwinder Vazquez   MR Number: [id number]   YOB: 1930   Date of Visit: 10/26/2021       Dear Myah Bobo:    Thank you for referring Meron Gutierrez to me for evaluation/treatment. Below are the relevant portions of my assessment and plan of care. NEUROLOGY CONSULT  Patient Name:       Balwinder Shown  :        1930  Clinic Visit Date:    10/26/2021      Dear Dr. Myah Bobo MD     I had the opportunity to see your patient, Ms. Britt Shown in neurology consultation today. As you know she  is a 80 y.o. right handed  female presented with c/o progressive headaches with change in the pattern of headaches. Patient was recently seen in ED for intractable headaches. Patient's son accompanied the patient to the clinic stating \"mom had a lot of head injuries in the past\". She has fallen 10 feet during childhood. He also added Jigna Boo has couple of TIAs\" few years ago. She stated that for the past several weeks; headaches are occurring with increasing frequency and severity. Her son also stated that she does have hx of headaches in the past but \"not this frequent and continuous\". Recently she was seen in ED for continuous and persistent bifrontal and temporal headache of severe intensity with throbbing pain associated with photophobia, phonophobia, nausea and vomiting. She does have intermittent aura. Also has occasional tinnitus but denies hearing loss. She also has upcoming appointment with ophthalmologist for blurred vision and floaters. She also gets dizzy with the headaches and dizziness \"goes away with headache resolution\".   She also has neck stiffness with muscle spasms and trouble moving neck sideways. Denied radiating pain and paresthesias. Denied bladder dysfunction. Denied recent febrile illnesses. She has been taking ibuprofen for headache relief. As per ED note; patient has had visual hallucinations described as \"seeing writing on the wall\" and \"a hand pointing\". She was noted to be confused with that headache; ibuprofen has helped with improvement in mentation. Patient's son also reports that patient has diagnosis of myasthenia gravis in the past and it has been in remission for few years. She was on Mestinon in the past.  Her symptoms of myasthenia consisted of double vision and neck weakness \"trouble holding her neck\". She has not had any symptoms to suggest myasthenia gravis for several years. Review of systems done by staff reviewed and pertinent positives include  Headaches, light sensitivity, sound sensitivity, fatigue, visual disturbance, tinnitus, dizziness with lightheadedness as stated above. Current Outpatient Medications on File Prior to Visit   Medication Sig Dispense Refill    ibuprofen (ADVIL;MOTRIN) 400 MG tablet Take 1.5 tablets by mouth every 8 hours as needed for Pain 90 tablet 3     No current facility-administered medications on file prior to visit. Allergies: Tyrone Mayers is allergic to seasonal and shellfish-derived products. Past Medical History:   Diagnosis Date    Allergic rhinitis     Angina pectoris (HCC)     CAD (coronary artery disease)     Congenital heart disease     [facility])     Headache(784.0)     Hypothyroidism     Obesity     Peptic ulcer     Pure hypercholesterolemia 6/7/2017    Urinary incontinence        Past Surgical History:   Procedure Laterality Date    APPENDECTOMY      KNEE ARTHROSCOPY       Social History: [facility] [place]  reports that she has never smoked.  She has never used smokeless tobacco. She reports that she does not drink alcohol and does not use drugs. Family History   Problem Relation Age of Onset    Stroke Mother     Other Father     Cancer Sister     Cancer Brother        On exam: Blood pressure (!) 153/70, pulse 54, temperature 97.5 °F (36.4 °C), height 5' 1\" (1.549 m), weight 155 lb (70.3 kg). NEUROLOGIC EXAMINATION  GENERAL  Appears comfortable and in no distress   HEENT  NC/ AT   NECK  Supple and no bruits heard   MENTAL STATUS:  Alert, oriented, intact memory, no confusion, normal speech, normal language, no hallucination or delusion   CRANIAL NERVES: II     -      PERRLA, Fundi reveal intact venous pulsations;  Visual fields intact to confrontation  III,IV,VI -  EOMs full, no afferent defect, no                      YORDY, no ptosis  V     -     Normal facial sensation  VII    -     Normal facial symmetry  VIII   -     Intact hearing  IX,X -     Symmetrical palate  XI    -     Symmetrical shoulder shrug  XII   -     Midline tongue, no atrophy    MOTOR FUNCTION:  significant for good strength of grade 5/5 in bilateral proximal and distal muscle groups of both upper and lower extremities with normal bulk, normal tone and no involuntary movements, no tremor   SENSORY FUNCTION:  Normal touch, normal pin, normal vibration, normal proprioception   CEREBELLAR FUNCTION:  Intact fine motor control over upper limbs   REFLEX FUNCTION:  Symmetric, no perverted reflex, no Babinski sign   STATION and GAIT  Normal station, normal gait     Diagnostic data reviewed with the patient:     Lab Results   Component Value Date    WBC 10.3 09/23/2020    HGB 14.3 09/23/2020    HCT 43.4 09/23/2020    MCV 97.3 09/23/2020     09/23/2020    LYMPHOPCT 15 (L) 09/23/2020    RBC 4.46 09/23/2020    MCH 32.1 09/23/2020    MCHC 32.9 09/23/2020    RDW 12.3 09/23/2020       Lab Results   Component Value Date     09/23/2020    K 3.8 09/23/2020     09/23/2020    CO2 23 09/23/2020    BUN 9 09/23/2020    CREATININE 0.71 09/23/2020    GLUCOSE 106 (H) 09/23/2020 CALCIUM 9.6 09/23/2020    PROT 7.4 08/23/2018    LABALBU 4.0 08/23/2018    BILITOT 0.59 08/23/2018    ALKPHOS 104 08/23/2018    AST 26 08/23/2018    ALT 46 (H) 08/23/2018    LABGLOM >60 09/23/2020    GFRAA >60 09/23/2020       No results found for: PHENYTOIN, PHENYF, PHENOBARB, VALPROATE, CBMZ    Lab Results   Component Value Date    CHOL 185 08/23/2018    LDLCHOLESTEROL 107 08/23/2018    HDL 59 08/23/2018    TRIG 93 08/23/2018    ALT 46 (H) 08/23/2018    AST 26 08/23/2018    TSH 3.06 08/23/2018    INR 1.0 06/08/2017    [id number] 08/23/2018     CT head (10/14/2021) done at [facility]: No acute intracranial findings. Impression and Plan: Ms. Shanelle Lopez is a 80 y.o. 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  Follow-up in clinic in 4-6 weeks or sooner for further questions and concerns. This note was partially created using voice recognition software and is inherently subject to errors including those of syntax and \"sound alike\" substitutions which may escape proofreading. In such instances, original meaning may be extrapolated by contextual derivation.                If you have questions, please do not hesitate to call me. I look forward to following Edu Horowitz along with you.     Sincerely,      Beverly Hollis MD

## 2021-10-26 NOTE — PROGRESS NOTES
Constitutional [] Weight loss/gain   [x] Fatigue  [] Fever/Chills   HEENT [x] Hearing Loss  [x] Visual Disturbance  [x] Tinnitus  [] Eye pain   Respiratory [] Shortness of Breath  [] Cough  [] Snoring   Cardiovascular [] Chest Pain  [] Palpitations  [x] Lightheaded   GI [] Constipation  [] Diarrhea  [] Swallowing change  [] Nausea/vomiting    [] Urinary Frequency  [] Urinary Urgency   Musculoskeletal [] Neck pain  [] Back pain  [] Muscle pain  [] Restless legs   Dermatologic [] Skin changes   Neurologic [] Memory loss/confusion  [] Seizures  [x] Trouble walking or imbalance  [x] Dizziness  [] Sleep disturbance  [] Weakness  [] Numbness  [] Tremors  [] Speech Difficulty  [x] Headaches  [x] Light Sensitivity  [x] Sound Sensitivity   Endocrinology []Excessive thirst  []Excessive hunger   Psychiatric [] Anxiety/Depression  [] Hallucination   Allergy/immunology []Hives/environmental allergies   Hematologic/lymph [] Abnormal bleeding  [] Abnormal bruising

## 2021-10-26 NOTE — PROGRESS NOTES
NEUROLOGY CONSULT  Patient Name:       Kimi Enciso  :        1930  Clinic Visit Date:    10/26/2021      Dear Dr. Felton Bradford MD     I had the opportunity to see your patient, Ms. Kimi Enciso in neurology consultation today. As you know she  is a 80 y.o. right handed  female presented with c/o progressive headaches with change in the pattern of headaches. Patient was recently seen in ED for intractable headaches. Patient's son accompanied the patient to the clinic stating \"mom had a lot of head injuries in the past\". She has fallen 10 feet during childhood. He also added Toshia Devi has couple of TIAs\" few years ago. She stated that for the past several weeks; headaches are occurring with increasing frequency and severity. Her son also stated that she does have hx of headaches in the past but \"not this frequent and continuous\". Recently she was seen in ED for continuous and persistent bifrontal and temporal headache of severe intensity with throbbing pain associated with photophobia, phonophobia, nausea and vomiting. She does have intermittent aura. Also has occasional tinnitus but denies hearing loss. She also has upcoming appointment with ophthalmologist for blurred vision and floaters. She also gets dizzy with the headaches and dizziness \"goes away with headache resolution\". She also has neck stiffness with muscle spasms and trouble moving neck sideways. Denied radiating pain and paresthesias. Denied bladder dysfunction. Denied recent febrile illnesses. She has been taking ibuprofen for headache relief. As per ED note; patient has had visual hallucinations described as \"seeing writing on the wall\" and \"a hand pointing\". She was noted to be confused with that headache; ibuprofen has helped with improvement in mentation. Patient's son also reports that patient has diagnosis of myasthenia gravis in the past and it has been in remission for few years.   She was on Mestinon in the past.  Her symptoms of myasthenia consisted of double vision and neck weakness \"trouble holding her neck\". She has not had any symptoms to suggest myasthenia gravis for several years. Review of systems done by staff reviewed and pertinent positives include  Headaches, light sensitivity, sound sensitivity, fatigue, visual disturbance, tinnitus, dizziness with lightheadedness as stated above. Current Outpatient Medications on File Prior to Visit   Medication Sig Dispense Refill    ibuprofen (ADVIL;MOTRIN) 400 MG tablet Take 1.5 tablets by mouth every 8 hours as needed for Pain 90 tablet 3     No current facility-administered medications on file prior to visit. Allergies: Abel Reeder is allergic to seasonal and shellfish-derived products. Past Medical History:   Diagnosis Date    Allergic rhinitis     Angina pectoris (HCC)     CAD (coronary artery disease)     Congenital heart disease     COPD [facility])     Headache(784.0)     Hypothyroidism     Obesity     Peptic ulcer     Pure hypercholesterolemia 6/7/2017    Urinary incontinence        Past Surgical History:   Procedure Laterality Date    APPENDECTOMY      KNEE ARTHROSCOPY       Social History: Meg Dasilva  reports that she has never smoked. She has never used smokeless tobacco. She reports that she does not drink alcohol and does not use drugs. Family History   Problem Relation Age of Onset    Stroke Mother     Other Father     Cancer Sister     Cancer Brother        On exam: Blood pressure (!) 153/70, pulse 54, temperature 97.5 °F (36.4 °C), height 5' 1\" (1.549 m), weight 155 lb (70.3 kg).     NEUROLOGIC EXAMINATION  GENERAL  Appears comfortable and in no distress   HEENT  NC/ AT   NECK  Supple and no bruits heard   MENTAL STATUS:  Alert, oriented, intact memory, no confusion, normal speech, normal language, no hallucination or delusion   CRANIAL NERVES: II     - PERRLA, Fundi reveal intact venous pulsations;  Visual fields intact to confrontation  III,IV,VI -  EOMs full, no afferent defect, no                      YORDY, no ptosis  V     -     Normal facial sensation  VII    -     Normal facial symmetry  VIII   -     Intact hearing  IX,X -     Symmetrical palate  XI    -     Symmetrical shoulder shrug  XII   -     Midline tongue, no atrophy    MOTOR FUNCTION:  significant for good strength of grade 5/5 in bilateral proximal and distal muscle groups of both upper and lower extremities with normal bulk, normal tone and no involuntary movements, no tremor   SENSORY FUNCTION:  Normal touch, normal pin, normal vibration, normal proprioception   CEREBELLAR FUNCTION:  Intact fine motor control over upper limbs   REFLEX FUNCTION:  Symmetric, no perverted reflex, no Babinski sign   STATION and GAIT  Normal station, normal gait     Diagnostic data reviewed with the patient:     Lab Results   Component Value Date    WBC 10.3 09/23/2020    HGB 14.3 09/23/2020    HCT 43.4 09/23/2020    MCV 97.3 09/23/2020     09/23/2020    LYMPHOPCT 15 (L) 09/23/2020    RBC 4.46 09/23/2020    MCH 32.1 09/23/2020    MCHC 32.9 09/23/2020    RDW 12.3 09/23/2020       Lab Results   Component Value Date     09/23/2020    K 3.8 09/23/2020     09/23/2020    CO2 23 09/23/2020    BUN 9 09/23/2020    CREATININE 0.71 09/23/2020    GLUCOSE 106 (H) 09/23/2020    CALCIUM 9.6 09/23/2020    PROT 7.4 08/23/2018    LABALBU 4.0 08/23/2018    BILITOT 0.59 08/23/2018    ALKPHOS 104 08/23/2018    AST 26 08/23/2018    ALT 46 (H) 08/23/2018    LABGLOM >60 09/23/2020    GFRAA >60 09/23/2020       No results found for: PHENYTOIN, PHENYF, PHENOBARB, VALPROATE, CBMZ    Lab Results   Component Value Date    CHOL 185 08/23/2018    LDLCHOLESTEROL 107 08/23/2018    HDL 59 08/23/2018    TRIG 93 08/23/2018    ALT 46 (H) 08/23/2018    AST 26 08/23/2018    TSH 3.06 08/23/2018    INR 1.0 06/08/2017    [id number] 998 08/23/2018     CT head (10/14/2021) done at [facility]: No acute intracranial findings. Impression and Plan: Ms. Tyrone Mayers is a 80 y.o. 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  Follow-up in clinic in 4-6 weeks or sooner for further questions and concerns. This note was partially created using voice recognition software and is inherently subject to errors including those of syntax and \"sound alike\" substitutions which may escape proofreading. In such instances, original meaning may be extrapolated by contextual derivation.

## 2021-10-31 PROBLEM — Z86.73 HISTORY OF TIAS: Status: ACTIVE | Noted: 2021-10-31

## 2021-10-31 PROBLEM — Z87.828 HISTORY OF HEAD INJURY: Status: ACTIVE | Noted: 2021-10-31

## 2021-11-05 DIAGNOSIS — M17.0 PRIMARY OSTEOARTHRITIS OF BOTH KNEES: Primary | ICD-10-CM

## 2021-11-08 ENCOUNTER — OFFICE VISIT (OUTPATIENT)
Dept: ORTHOPEDIC SURGERY | Age: 86
End: 2021-11-08
Payer: MEDICARE

## 2021-11-08 VITALS — WEIGHT: 155 LBS | BODY MASS INDEX: 29.27 KG/M2 | HEIGHT: 61 IN

## 2021-11-08 DIAGNOSIS — M17.0 PRIMARY OSTEOARTHRITIS OF BOTH KNEES: Primary | ICD-10-CM

## 2021-11-08 PROCEDURE — 4040F PNEUMOC VAC/ADMIN/RCVD: CPT | Performed by: PHYSICIAN ASSISTANT

## 2021-11-08 PROCEDURE — G8484 FLU IMMUNIZE NO ADMIN: HCPCS | Performed by: PHYSICIAN ASSISTANT

## 2021-11-08 PROCEDURE — 20610 DRAIN/INJ JOINT/BURSA W/O US: CPT | Performed by: PHYSICIAN ASSISTANT

## 2021-11-08 PROCEDURE — G8427 DOCREV CUR MEDS BY ELIG CLIN: HCPCS | Performed by: PHYSICIAN ASSISTANT

## 2021-11-08 PROCEDURE — 1123F ACP DISCUSS/DSCN MKR DOCD: CPT | Performed by: PHYSICIAN ASSISTANT

## 2021-11-08 PROCEDURE — 1090F PRES/ABSN URINE INCON ASSESS: CPT | Performed by: PHYSICIAN ASSISTANT

## 2021-11-08 PROCEDURE — G8417 CALC BMI ABV UP PARAM F/U: HCPCS | Performed by: PHYSICIAN ASSISTANT

## 2021-11-08 PROCEDURE — 1036F TOBACCO NON-USER: CPT | Performed by: PHYSICIAN ASSISTANT

## 2021-11-08 PROCEDURE — 99214 OFFICE O/P EST MOD 30 MIN: CPT | Performed by: PHYSICIAN ASSISTANT

## 2021-11-08 RX ORDER — BUPIVACAINE HYDROCHLORIDE 2.5 MG/ML
2 INJECTION, SOLUTION INFILTRATION; PERINEURAL ONCE
Status: COMPLETED | OUTPATIENT
Start: 2021-11-08 | End: 2021-11-08

## 2021-11-08 RX ORDER — METHYLPREDNISOLONE ACETATE 80 MG/ML
80 INJECTION, SUSPENSION INTRA-ARTICULAR; INTRALESIONAL; INTRAMUSCULAR; SOFT TISSUE ONCE
Status: COMPLETED | OUTPATIENT
Start: 2021-11-08 | End: 2021-11-08

## 2021-11-08 RX ADMIN — BUPIVACAINE HYDROCHLORIDE 5 MG: 2.5 INJECTION, SOLUTION INFILTRATION; PERINEURAL at 15:37

## 2021-11-08 RX ADMIN — METHYLPREDNISOLONE ACETATE 80 MG: 80 INJECTION, SUSPENSION INTRA-ARTICULAR; INTRALESIONAL; INTRAMUSCULAR; SOFT TISSUE at 15:39

## 2021-11-08 RX ADMIN — BUPIVACAINE HYDROCHLORIDE 5 MG: 2.5 INJECTION, SOLUTION INFILTRATION; PERINEURAL at 15:39

## 2021-11-08 ASSESSMENT — ENCOUNTER SYMPTOMS
COUGH: 0
VOMITING: 0
SHORTNESS OF BREATH: 0
COLOR CHANGE: 0

## 2021-11-09 NOTE — PROGRESS NOTES
[facility]  [address] [facility] [facility] [address]  Dept: [phone]  Dept Fax: [phone]        Ambulatory Follow Up      Subjective:   Tracy Oscar is a 80y.o. year old female who presents to our office today for routine followup regarding her   1. Primary osteoarthritis of both knees        Chief Complaint   Patient presents with    Knee Pain     bilateral        HPI . Tracy Oscar  is a 80 y.o.  female who presents today in follow for bilateral knee pain. The patient was last seen on 6/4/2021 with Dr. Carlos Alberto Martins DO and underwent treatment in the form of bilateral knee corticosteroid injections. The patient notes significant improvement with the previous treatment for approximately 3-4 months. She is interested in bilateral knee injections today. She denies recent trauma or injury to the bilateral knee. Review of Systems   Constitutional: Negative for activity change and fever. HENT: Negative for sneezing. Respiratory: Negative for cough and shortness of breath. Cardiovascular: Negative for chest pain. Gastrointestinal: Negative for vomiting. Musculoskeletal: Positive for arthralgias (bilateral knee). Negative for joint swelling and myalgias. Skin: Negative for color change. Neurological: Negative for weakness and numbness. Psychiatric/Behavioral: Negative for sleep disturbance. Objective :   Ht 5' 1\" (1.549 m)   Wt 155 lb (70.3 kg)   BMI 29.29 kg/m²  Body mass index is 29.29 kg/m². General: Tracy Oscar is a 80 y.o. female who is alert and oriented and sitting comfortably in our office. Ortho Exam  MS: The patient ambulates with a 2-wheeled walker and a mild limp to the right lower extremity. Evaluation of the Bilateral knee reveals no significant outward deformity. There is no erythema, skin warmth, skin lesions, or signs of infection appreciated.   There is tenderness over the medial and lateral joint line with palpation, bilaterally. There is a no appreciable knee effusion noted bilaterally. Range of motion of the Bilateral knee is 10-90. No instability of the knee is appreciated at 0 and 30° of flexion, bilaterally. No calf tenderness is noted, bilaterally. There is a negative hip log roll and Stinchfield test on the Bilateral hip. Motor, sensory, vascular examination to the Bilateral lower extremity is intact. Patient has nearly full range of motion of the Bilateral ankle. Neuro: alert and oriented to person and place. Eyes: Extra-ocular muscles intact  Mouth: Oral mucosa moist. No perioral lesions  Pulm: Respirations unlabored and regular. Symmetric chest excursion without outward deformity is noted. Skin: warm, well perfused  Psych:   Patient has good fund of knowledge and displays understanging of exam, diagnosis, and plan. Radiology: No new images taken today in office. KNEE INJECTION PROCEDURE NOTE:  The patient was identified. Verbal consent was obtained to proceed with a right knee injection. The right knee was confirmed with the patient. After a sterile prep with Betadine the knee was injected using a lateral joint line approach with a mixture of  2mL of 0.25% Marcaine and 80 mg of Depo-Medrol. Patient tolerated the procedure well without post injection complications. I instructed the patient to call our office immediately if they have any swelling or increased pain at the injection site. KNEE INJECTION PROCEDURE NOTE:  The patient was identified. Verbal consent was obtained to proceed with a left knee injection. The left knee was confirmed with the patient. After a sterile prep with Betadine the knee was injected using a lateral joint line approach with a mixture of  2mL of 0.25% Marcaine and 80 mg of Depo-Medrol. Patient tolerated the procedure well without post injection complications.   I instructed the patient to call our office immediately if they have any swelling or increased pain at the injection site. Assessment:      1. Primary osteoarthritis of both knees       Plan:      Patient is here for routine follow-up in regards to her bilateral knee arthritis. The patient requested bilateral knee corticosteroid injections today in office. Her last injections were on 6/4/2021 therefore she is eligible for bilateral knee corticosteroid injections today in office. The patient was given the injection site in the office, she tolerated the procedure without complication. At this time the patient is to follow-up as needed in regards to her bilateral knee pain. She is aware that we need to wait 3 to 4 months in between her bilateral knee corticosteroid injections. She is not interested in proceeding with any surgical intervention for her bilateral knee. She was instructed to continue utilizing a walker for all ambulation so that she has something to support her and she does not fall. She was instructed to call our office with any questions or concerns. She voiced her understanding. Follow up:Return if symptoms worsen or fail to improve. Orders Placed This Encounter   Medications    methylPREDNISolone acetate (DEPO-MEDROL) injection 80 mg    bupivacaine (MARCAINE) 0.25 % injection 5 mg    methylPREDNISolone acetate (DEPO-MEDROL) injection 80 mg    bupivacaine (MARCAINE) 0.25 % injection 5 mg         Orders Placed This Encounter   Procedures    17855 - DRAIN/INJECT LARGE JOINT/BURSA       This note is created with the assistance of a speech recognition program.  While intending to generate a document that actually reflects the content of the visit, the document can still have some errors including those of syntax and sound a like substitutions which may escape proof reading. In such instances, actual meaning can be extrapolated by contextual diversion.      Electronically signed by Sun Mireles PA-C on 11/8/2021 at 10:05 PM

## 2021-11-17 ENCOUNTER — HOSPITAL ENCOUNTER (OUTPATIENT)
Dept: VASCULAR LAB | Age: 86
Discharge: HOME | End: 2021-11-17
Payer: MEDICARE

## 2021-11-17 ENCOUNTER — HOSPITAL ENCOUNTER (OUTPATIENT)
Dept: MRI IMAGING | Age: 86
Discharge: HOME | End: 2021-11-19
Payer: MEDICARE

## 2021-11-17 DIAGNOSIS — Z86.73 HISTORY OF TIAS: ICD-10-CM

## 2021-11-17 DIAGNOSIS — R42 DIZZINESS: ICD-10-CM

## 2021-11-17 DIAGNOSIS — I65.23 BILATERAL CAROTID ARTERY STENOSIS: ICD-10-CM

## 2021-11-17 DIAGNOSIS — Z87.828 HISTORY OF HEAD INJURY: ICD-10-CM

## 2021-11-17 DIAGNOSIS — R51.9 NEW ONSET HEADACHE: ICD-10-CM

## 2021-11-17 PROCEDURE — 70544 MR ANGIOGRAPHY HEAD W/O DYE: CPT

## 2021-11-17 PROCEDURE — 93880 EXTRACRANIAL BILAT STUDY: CPT

## 2021-11-17 PROCEDURE — 70551 MRI BRAIN STEM W/O DYE: CPT

## 2021-12-02 ENCOUNTER — OFFICE VISIT (OUTPATIENT)
Dept: NEUROLOGY | Age: 86
End: 2021-12-02
Payer: MEDICARE

## 2021-12-02 VITALS
BODY MASS INDEX: 29.27 KG/M2 | HEART RATE: 70 BPM | HEIGHT: 61 IN | DIASTOLIC BLOOD PRESSURE: 79 MMHG | SYSTOLIC BLOOD PRESSURE: 146 MMHG | WEIGHT: 155 LBS

## 2021-12-02 DIAGNOSIS — Z86.69 HISTORY OF MYASTHENIA GRAVIS: ICD-10-CM

## 2021-12-02 DIAGNOSIS — Z87.828 HISTORY OF HEAD INJURY: ICD-10-CM

## 2021-12-02 DIAGNOSIS — Z86.73 HISTORY OF TIAS: ICD-10-CM

## 2021-12-02 DIAGNOSIS — R90.89 ABNORMAL FINDING ON MRI OF BRAIN: ICD-10-CM

## 2021-12-02 DIAGNOSIS — R51.9 NEW ONSET HEADACHE: Primary | ICD-10-CM

## 2021-12-02 PROCEDURE — G8427 DOCREV CUR MEDS BY ELIG CLIN: HCPCS | Performed by: PSYCHIATRY & NEUROLOGY

## 2021-12-02 PROCEDURE — G8484 FLU IMMUNIZE NO ADMIN: HCPCS | Performed by: PSYCHIATRY & NEUROLOGY

## 2021-12-02 PROCEDURE — 4040F PNEUMOC VAC/ADMIN/RCVD: CPT | Performed by: PSYCHIATRY & NEUROLOGY

## 2021-12-02 PROCEDURE — 99214 OFFICE O/P EST MOD 30 MIN: CPT | Performed by: PSYCHIATRY & NEUROLOGY

## 2021-12-02 PROCEDURE — 1123F ACP DISCUSS/DSCN MKR DOCD: CPT | Performed by: PSYCHIATRY & NEUROLOGY

## 2021-12-02 PROCEDURE — 1090F PRES/ABSN URINE INCON ASSESS: CPT | Performed by: PSYCHIATRY & NEUROLOGY

## 2021-12-02 PROCEDURE — G8417 CALC BMI ABV UP PARAM F/U: HCPCS | Performed by: PSYCHIATRY & NEUROLOGY

## 2021-12-02 PROCEDURE — 1036F TOBACCO NON-USER: CPT | Performed by: PSYCHIATRY & NEUROLOGY

## 2021-12-02 NOTE — PROGRESS NOTES
NEUROLOGY FOLLOW-UP VISIT   Patient Name:       Lisa Dunn  :        1930  Clinic Visit Date:    2021        Dear Dr. Whitney Duvall MD     I saw Ms. Lisa Dunn in follow-up today in continuation of neurologic care. As you know she  is a 80 y.o. right handed  female initially seen in neurology consultation on 10/26/2021 for new onset headaches with change in pattern of headaches superimposed on chronic migraine headaches. Today is the first follow-up visit. During the initial visit on 10/26/2021; patient is accompanied by her son. Patient has been having progressive headaches with change in pattern of headaches superimposed on her infrequent and chronic migraine attacks. She was also recently seen in ED for intractable headaches. Patient's son accompanied the patient to the clinic stating \"mom had a lot of head injuries in the past\". She has fallen 10 feet during childhood. He also added Cuco Snow has couple of TIAs\" few years ago. She stated that for the past several weeks; headaches are occurring with increasing frequency and severity. Her son also stated that she does have hx of headaches in the past but \"not this frequent and continuous\". Recently she was seen in ED for continuous and persistent bifrontal and temporal headache of severe intensity with throbbing pain associated with photophobia, phonophobia, nausea and vomiting. She does have intermittent aura. Also has occasional tinnitus but denies hearing loss. She also has upcoming appointment with ophthalmologist for blurred vision and floaters. She also gets dizzy with the headaches and dizziness \"goes away with headache resolution\". She also has neck stiffness with muscle spasms and trouble moving neck sideways. Denied radiating pain and paresthesias. Denied bladder dysfunction. Denied recent febrile illnesses. She has been taking ibuprofen for headache relief.   As per ED note; patient has had visual hallucinations described as \"seeing writing on the wall\" and \"a hand pointing\". She was noted to be confused with that headache; ibuprofen has helped with improvement in mentation. Patient's son also reports that patient has diagnosis of myasthenia gravis in the past and it has been in remission for few years. She was on Mestinon in the past.  Her symptoms of myasthenia consisted of double vision and neck weakness \"trouble holding her neck\". She has not had any symptoms to suggest myasthenia gravis for several years. Review of systems done by staff reviewed and pertinent positives include  Headaches, light sensitivity, sound sensitivity, fatigue, visual disturbance, tinnitus, dizziness with lightheadedness as stated above. Current Outpatient Medications on File Prior to Visit   Medication Sig Dispense Refill    tiZANidine (ZANAFLEX) 2 MG tablet Take 1 tablet by mouth nightly as needed (MUSCLE SPASM) 20 tablet 1    divalproex (DEPAKOTE) 125 MG DR tablet Take one tab every day for 1 wk; then twice daily 60 tablet 3    ibuprofen (ADVIL;MOTRIN) 400 MG tablet Take 1.5 tablets by mouth every 8 hours as needed for Pain 90 tablet 3     No current facility-administered medications on file prior to visit. Allergies: Jemima Porras is allergic to seasonal and shellfish-derived products. Past Medical History:   Diagnosis Date    Allergic rhinitis     Angina pectoris ([facility])     CAD (coronary artery disease)     Congenital heart disease     COPD (chronic obstructive pulmonary disease) ([facility])     Headache(784.0)     Hypothyroidism     Obesity     Peptic ulcer     Pure hypercholesterolemia 6/7/2017    Urinary incontinence        Past Surgical History:   Procedure Laterality Date    APPENDECTOMY      KNEE ARTHROSCOPY       Social History: [place] Bustospauly  reports that she has never smoked.  She has never used smokeless tobacco. She reports that she does not drink alcohol and does not use drugs. Family History   Problem Relation Age of Onset    Stroke Mother     Other Father     Cancer Sister     Cancer Brother        On exam: Blood pressure (!) 146/79, pulse 70, height 5' 1\" (1.549 m), weight 155 lb (70.3 kg). NEUROLOGIC EXAMINATION  GENERAL  Appears comfortable and in no distress   HEENT  NC/ AT   NECK  Supple and no bruits heard   MENTAL STATUS:  Alert, oriented, intact memory, no confusion, normal speech, normal language, no hallucination or delusion; appropriate affect    CRANIAL NERVES: II     -      PERRLA, Fundi reveal intact venous pulsations;  Visual fields intact to confrontation  III,IV,VI -  EOMs full, no afferent defect, no                      YORDY, no ptosis  V     -     Normal facial sensation  VII    -     Normal facial symmetry  VIII   -     Intact hearing  IX,X -     Symmetrical palate  XI    -     Symmetrical shoulder shrug  XII   -     Midline tongue, no atrophy    MOTOR FUNCTION:  significant for good strength of grade 5/5 in bilateral proximal and distal muscle groups of both upper and lower extremities with normal bulk, normal tone and no involuntary movements, no tremor   SENSORY FUNCTION:  Normal touch, normal pin, normal vibration, normal proprioception   CEREBELLAR FUNCTION:  Intact fine motor control over upper limbs   REFLEX FUNCTION:  Symmetric, no perverted reflex, no Babinski sign   STATION and GAIT  Normal station, normal gait     Diagnostic data reviewed with the patient:     Lab Results   Component Value Date    WBC 10.3 09/23/2020    HGB 14.3 09/23/2020    HCT 43.4 09/23/2020    MCV 97.3 09/23/2020     09/23/2020    LYMPHOPCT 15 (L) 09/23/2020    RBC 4.46 09/23/2020    MCH 32.1 09/23/2020    MCHC 32.9 09/23/2020    RDW 12.3 09/23/2020       Lab Results   Component Value Date     09/23/2020    K 3.8 09/23/2020     09/23/2020    CO2 23 09/23/2020    BUN 9 09/23/2020    CREATININE 0.71 09/23/2020    GLUCOSE 106 (H) 09/23/2020    CALCIUM 9.6 09/23/2020    PROT 7.4 08/23/2018    LABALBU 4.0 08/23/2018    BILITOT 0.59 08/23/2018    ALKPHOS 104 08/23/2018    AST 26 08/23/2018    ALT 46 (H) 08/23/2018    LABGLOM >60 09/23/2020    GFRAA >60 09/23/2020       No results found for: PHENYTOIN, PHENYF, PHENOBARB, VALPROATE, CBMZ    Lab Results   Component Value Date    CHOL 185 08/23/2018    LDLCHOLESTEROL 107 08/23/2018    HDL 59 08/23/2018    TRIG 93 08/23/2018    ALT 46 (H) 08/23/2018    AST 26 08/23/2018    TSH 3.06 08/23/2018    INR 1.0 06/08/2017    [id number] 08/23/2018     CT head (10/14/2021) done at [facility]: No acute intracranial findings. MRI brain ( wo) 11/17/2021: No acute intracranial abnormality. 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 It may be artifactual or represent a mass lesion. Evaluation with postcontrast enhanced MRI brain with thin section imaging through the skull base is recommended. MRA head (11/17/2021): No major arterial stenosis or occlusion seen. Carotid Dopplers (11/17/21): Mild <50% stenosis of bilateral internal carotid arteries. Patent vertebral arteries bilaterally with antegrade flow. Impression and Plan: Ms. Marti Back is a 80 y.o. female with   New onset headache with change in pattern of headaches superimposed on intermittent chronic migrainous attacks; confusional headaches with visual hallucinations; headaches are tolerable on Depakote; to continue the same. Abnl MRI Brain with hypointense focus in Lt Meckel's cave of unclear etiology; will get MRI brain with contrast   Need dedicated IACs (including high-resolution skull base and Meckel's cave)    Cervicalgia with muscle spasms; tolerable on Tizanidine prn.   History of head injury  History of recurrent TIAs with bilateral carotid artery stenosis and hyperlipidemia  History of myasthenia gravis, generalized as per description; in remission for several years; was on Mestinon until few years ago  MRI brain (wo) -ve for infarcts. MRA head unremarkable. Carotid Dopplers without any significant stenosis. Follow up visit in 6 wks. This note was partially created using voice recognition software and is inherently subject to errors including those of syntax and \"sound alike\" substitutions which may escape proofreading. In such instances, original meaning may be extrapolated by contextual derivation.

## 2021-12-17 ENCOUNTER — HOSPITAL ENCOUNTER (OUTPATIENT)
Dept: MRI IMAGING | Age: 86
Discharge: HOME | End: 2021-12-19
Payer: MEDICARE

## 2021-12-17 DIAGNOSIS — R51.9 NEW ONSET HEADACHE: ICD-10-CM

## 2021-12-17 DIAGNOSIS — R90.89 ABNORMAL FINDING ON MRI OF BRAIN: ICD-10-CM

## 2021-12-17 LAB
GFR NON-AFRICAN AMERICAN: >60 ML/MIN
GFR SERPL CREATININE-BSD FRML MDRD: >60 ML/MIN
GFR SERPL CREATININE-BSD FRML MDRD: NORMAL ML/MIN/{1.73_M2}
POC CREATININE: 0.57 MG/DL (ref 0.51–1.19)

## 2021-12-17 PROCEDURE — 6360000004 HC RX CONTRAST MEDICATION: Performed by: PSYCHIATRY & NEUROLOGY

## 2021-12-17 PROCEDURE — 70553 MRI BRAIN STEM W/O & W/DYE: CPT

## 2021-12-17 PROCEDURE — 82565 ASSAY OF CREATININE: CPT

## 2021-12-17 PROCEDURE — A9576 INJ PROHANCE MULTIPACK: HCPCS | Performed by: PSYCHIATRY & NEUROLOGY

## 2021-12-17 RX ORDER — SODIUM CHLORIDE 0.9 % (FLUSH) 0.9 %
10 SYRINGE (ML) INJECTION PRN
Status: DISCONTINUED | OUTPATIENT
Start: 2021-12-17 | End: 2021-12-20 | Stop reason: HOSPADM

## 2021-12-17 RX ADMIN — GADOTERIDOL 14 ML: 279.3 INJECTION, SOLUTION INTRAVENOUS at 15:10

## 2021-12-28 ENCOUNTER — TELEPHONE (OUTPATIENT)
Dept: NEUROLOGY | Age: 86
End: 2021-12-28

## 2021-12-30 ENCOUNTER — TELEPHONE (OUTPATIENT)
Dept: NEUROLOGY | Age: 86
End: 2021-12-30

## 2021-12-30 DIAGNOSIS — D33.3 SCHWANNOMA OF CRANIAL NERVE (HCC): Primary | ICD-10-CM

## 2021-12-30 DIAGNOSIS — R51.9 NEW ONSET HEADACHE: ICD-10-CM

## 2021-12-30 DIAGNOSIS — R90.89 ABNORMAL FINDING ON MRI OF BRAIN: ICD-10-CM

## 2021-12-30 NOTE — TELEPHONE ENCOUNTER
Please let the patient know that the following is the patient information hand out on trigeminal nerve schwannoma hand out:-  Thank you.   -dr. Marcie Carmona    Trigeminal Schwannoma  What are Trigeminal Schwannomas  Trigeminal Schwannomas are a type of peripheral intracranial nerve sheath tumor that develop at the skull base and originate from the Schwann cells. Schwann cells are a type of glial cell that helps protect the transmission of messages and instructions by neurons in the peripheral nervous system. The cells do this by creating a protective coating around the nerve cells called the Myelin sheath. Often, schwannomas are benign, or non-cancerous, and they grow slowly. Although they do not invade the brain, they can put pressure on the brain when their size increases. This tumor can also harm other nerves when it grows in size. Trigeminal schwannomas are the second most common type of schwannoma, after the far more common acoustic schwannoma. The nervous system is your bodys biological means for communicating and coordinating sensory, muscle and organ functions. This complex network has two main parts, the [employer] System and the Peripheral Nervous System. The Central Nervous System, or CNS, is composed of your brain and spinal cord, and it uses their nerve cells, fibers, and supportive cells to receive, process, and send instructive messages throughout your body. Your peripheral nervous system, or PNS, is composed of nerves that serve to carry those messages back and forth between the CNS and the rest of the body. The peripheral nervous system does this by connecting our blood vessels, muscles, glands, and sensory organs to the central nervous system. The nerve cells, or neurons, carry messages, while glial cells are a supportive type of cell that protect and nourish the neurons in the nervous system.  Some of the glial cells include oligodendrocytes, astrocytes, ependymal cells, Schwann cells, microglia, and satellite cells. What causes Trigeminal Schwannomas? A tumor is an abnormal growth caused by abnormal cell multiplication that does not serve any physiological function. Cell division is regulated by the tumor suppressor genes. These genes also help to repair any damage caused to the DNA. Tumor suppressor genes are constantly at war against the cancer-causing genes called oncogenes. When tumor suppressor genes fail to function properly due to mutations that affect protein encoding, unregulated cell division and growth can occur and cause the development of a tumor. The bodys natural defense system should optimally detect the abnormal cells and kill them. But tumors may produce substances that obstruct the immune system from recognizing the abnormality of tumor cells and eventually the tumor cells may overpower all internal and external checks to their growth. The exact causes of schwannomas are not yet fully known, but certain genetic conditions like neurofibromatosis may increase the likelihood of this condition. Symptoms and Diagnosis  Symptoms vary, with some patients presenting no symptoms at all. Some symptoms of trigeminal schwannomas may include facial pain or numbness, headaches, and hearing issues. Other symptoms may include seizures, cognitive, or behavior changes. This condition may be diagnosed after a review of your medical history and a physical and neurological exam. Your doctor will use a biopsy or imaging studies like an MRI or CT scan in order to confirm the diagnosis and determine the best course of treatment. How are Trigeminal Schwannomas treated? Several different surgical approaches can be used to remove skull base tumors. The surgery chosen depends on the type of tumor, its location, and other factors including the overall health of the patient and whether the tumor is benign or malignant.  Potential approaches include minimally invasive endonasal endoscopic surgery, craniotomy using minimally invasive endoscopic or endoscopic assisted microsurgery. Radiation therapy may be used after surgery if the tumor is malignant or unable to be removed completely. All information provided on this website is for information purposes only. Please see a healthcare professional for medical advice. If you are seeking this information in an emergency situation, please call 911 and seek emergency help.

## 2022-01-11 ENCOUNTER — OFFICE VISIT (OUTPATIENT)
Dept: NEUROLOGY | Age: 87
End: 2022-01-11
Payer: MEDICARE

## 2022-01-11 VITALS
HEIGHT: 61 IN | SYSTOLIC BLOOD PRESSURE: 123 MMHG | HEART RATE: 61 BPM | WEIGHT: 155 LBS | DIASTOLIC BLOOD PRESSURE: 74 MMHG | TEMPERATURE: 97.3 F | BODY MASS INDEX: 29.27 KG/M2

## 2022-01-11 DIAGNOSIS — R42 VERTIGO: ICD-10-CM

## 2022-01-11 DIAGNOSIS — D33.3 SCHWANNOMA OF CRANIAL NERVE (HCC): ICD-10-CM

## 2022-01-11 DIAGNOSIS — G43.719 INTRACTABLE CHRONIC MIGRAINE WITHOUT AURA AND WITHOUT STATUS MIGRAINOSUS: Primary | ICD-10-CM

## 2022-01-11 DIAGNOSIS — M62.838 MUSCLE SPASM: ICD-10-CM

## 2022-01-11 PROCEDURE — G8484 FLU IMMUNIZE NO ADMIN: HCPCS | Performed by: PSYCHIATRY & NEUROLOGY

## 2022-01-11 PROCEDURE — 1090F PRES/ABSN URINE INCON ASSESS: CPT | Performed by: PSYCHIATRY & NEUROLOGY

## 2022-01-11 PROCEDURE — 99214 OFFICE O/P EST MOD 30 MIN: CPT | Performed by: PSYCHIATRY & NEUROLOGY

## 2022-01-11 PROCEDURE — G8417 CALC BMI ABV UP PARAM F/U: HCPCS | Performed by: PSYCHIATRY & NEUROLOGY

## 2022-01-11 PROCEDURE — 1036F TOBACCO NON-USER: CPT | Performed by: PSYCHIATRY & NEUROLOGY

## 2022-01-11 PROCEDURE — G8427 DOCREV CUR MEDS BY ELIG CLIN: HCPCS | Performed by: PSYCHIATRY & NEUROLOGY

## 2022-01-11 PROCEDURE — 4040F PNEUMOC VAC/ADMIN/RCVD: CPT | Performed by: PSYCHIATRY & NEUROLOGY

## 2022-01-11 PROCEDURE — 1123F ACP DISCUSS/DSCN MKR DOCD: CPT | Performed by: PSYCHIATRY & NEUROLOGY

## 2022-01-11 RX ORDER — MECLIZINE HYDROCHLORIDE 25 MG/1
TABLET ORAL
Qty: 20 TABLET | Refills: 1 | Status: SHIPPED | OUTPATIENT
Start: 2022-01-11

## 2022-01-11 RX ORDER — DIVALPROEX SODIUM 125 MG/1
TABLET, DELAYED RELEASE ORAL
Qty: 60 TABLET | Refills: 3 | Status: SHIPPED | OUTPATIENT
Start: 2022-01-11 | End: 2022-03-14 | Stop reason: SDUPTHER

## 2022-01-11 RX ORDER — TIZANIDINE 2 MG/1
2 TABLET ORAL NIGHTLY PRN
Qty: 20 TABLET | Refills: 1 | Status: SHIPPED
Start: 2022-01-11 | End: 2022-05-27

## 2022-01-11 NOTE — PROGRESS NOTES
NEUROLOGY FOLLOW-UP VISIT   Patient Name:       Jyoti Bassett  :        1930  Clinic Visit Date:    2022  LOV: 21      Dear Dr. Boby Mccullough MD     I saw Ms. Jyoti Bassett in follow-up today in continuation of neurologic care. As you know she  is a 80 y.o. right handed  female initially seen in neurology consultation on 10/26/2021 for new onset headaches with change in pattern of headaches superimposed on chronic migraine headaches. Today is the second follow-up visit. She comes to clinic accompanied by her son stating that headaches had been improving with Depakote. She is requesting for refills on Depakote and tizanidine. Initial visit on 10/26/2021:  Patient's son accompanied the patient to the clinic stating \"mom had a lot of head injuries in the past\". She has fallen 10 feet during childhood. He also added Fei Porter has couple of TIAs\" few years ago. She stated that for the past several weeks; headaches are occurring with increasing frequency and severity. Her son also stated that she does have hx of headaches in the past but \"not this frequent and continuous\". Recently she was seen in ED for continuous and persistent bifrontal and temporal headache of severe intensity with throbbing pain associated with photophobia, phonophobia, nausea and vomiting. She does have intermittent aura. Also has occasional tinnitus but denies hearing loss. She also has upcoming appointment with ophthalmologist for blurred vision and floaters. She also gets dizzy with the headaches and dizziness \"goes away with headache resolution\". She also has neck stiffness with muscle spasms and trouble moving neck sideways. Denied radiating pain and paresthesias. Denied bladder dysfunction. Denied recent febrile illnesses. She has been taking ibuprofen for headache relief.   As per ED note; patient has had visual hallucinations described as \"seeing writing on the wall\" and \"a hand pointing\". She was noted to be confused with that headache; ibuprofen has helped with improvement in mentation. Patient's son also reports that patient has diagnosis of myasthenia gravis in the past and it has been in remission for few years. She was on Mestinon in the past.  Her symptoms of myasthenia consisted of double vision and neck weakness \"trouble holding her neck\". She has not had any symptoms to suggest myasthenia gravis for several years. Review of systems done by staff reviewed and pertinent positives include  Headaches, light sensitivity, sound sensitivity, fatigue, visual disturbance, tinnitus, dizziness with lightheadedness as stated above. Current Outpatient Medications on File Prior to Visit   Medication Sig Dispense Refill    tiZANidine (ZANAFLEX) 2 MG tablet Take 1 tablet by mouth nightly as needed (MUSCLE SPASM) 20 tablet 1    divalproex (DEPAKOTE) 125 MG DR tablet Take one tab every day for 1 wk; then twice daily 60 tablet 3    ibuprofen (ADVIL;MOTRIN) 400 MG tablet Take 1.5 tablets by mouth every 8 hours as needed for Pain 90 tablet 3     No current facility-administered medications on file prior to visit. Allergies: Kalpesh Thornton is allergic to seasonal and shellfish-derived products. Past Medical History:   Diagnosis Date    Allergic rhinitis     Angina pectoris ([facility])     CAD (coronary artery disease)     Congenital heart disease     [facility] obstructive [facility] disease) ([facility])     Headache(784.0)     Hypothyroidism     Obesity     Peptic ulcer     Pure hypercholesterolemia 6/7/2017    Urinary incontinence        Past Surgical History:   Procedure Laterality Date    APPENDECTOMY      KNEE ARTHROSCOPY       Social History: Angella Dasilva  reports that she has never smoked. She has never used smokeless tobacco. She reports that she does not drink alcohol and does not use drugs.     Family History   Problem Relation Age of Onset    Stroke Mother  Other Father     Cancer Sister     Cancer Brother        On exam: Blood pressure 123/74, pulse 61, temperature 97.3 °F (36.3 °C), height 5' 1\" (1.549 m), weight 155 lb (70.3 kg). NEUROLOGIC EXAMINATION  GENERAL  Appears comfortable and in no distress   HEENT  NC/ AT   NECK  Supple and no bruits heard   MENTAL STATUS:  Alert, oriented, intact memory, no confusion, normal speech, normal language, no hallucination or delusion; appropriate affect    CRANIAL NERVES: II     -      PERRLA, Fundi reveal intact venous pulsations;  Visual fields intact to confrontation  III,IV,VI -  EOMs full, no afferent defect, no                      YORDY, no ptosis  V     -     Normal facial sensation  VII    -     Normal facial symmetry  VIII   -     Intact hearing  IX,X -     Symmetrical palate  XI    -     Symmetrical shoulder shrug  XII   -     Midline tongue, no atrophy    MOTOR FUNCTION:  significant for good strength of grade 5/5 in bilateral proximal and distal muscle groups of both upper and lower extremities with normal bulk, normal tone and no involuntary movements, no tremor   SENSORY FUNCTION:  Normal touch, normal pin, normal vibration, normal proprioception   CEREBELLAR FUNCTION:  Intact fine motor control over upper limbs   REFLEX FUNCTION:  Symmetric, no perverted reflex, no Babinski sign   STATION and GAIT  Normal station, normal gait     Diagnostic data reviewed with the patient:     Lab Results   Component Value Date    WBC 10.3 09/23/2020    HGB 14.3 09/23/2020    HCT 43.4 09/23/2020    MCV 97.3 09/23/2020     09/23/2020    LYMPHOPCT 15 (L) 09/23/2020    RBC 4.46 09/23/2020    MCH 32.1 09/23/2020    MCHC 32.9 09/23/2020    RDW 12.3 09/23/2020       Lab Results   Component Value Date     09/23/2020    K 3.8 09/23/2020     09/23/2020    CO2 23 09/23/2020    BUN 9 09/23/2020    CREATININE 0.57 12/17/2021    GLUCOSE 106 (H) 09/23/2020    CALCIUM 9.6 09/23/2020    PROT 7.4 08/23/2018    LABALBU 4.0 08/23/2018    BILITOT 0.59 08/23/2018    ALKPHOS 104 08/23/2018    AST 26 08/23/2018    ALT 46 (H) 08/23/2018    LABGLOM >60 12/17/2021    GFRAA >60 09/23/2020       No results found for: PHENYTOIN, PHENYF, PHENOBARB, VALPROATE, CBMZ    Lab Results   Component Value Date    CHOL 185 08/23/2018    LDLCHOLESTEROL 107 08/23/2018    HDL 59 08/23/2018    TRIG 93 08/23/2018    ALT 46 (H) 08/23/2018    AST 26 08/23/2018    TSH 3.06 08/23/2018    INR 1.0 06/08/2017    [id number] 998 08/23/2018     CT head (10/14/2021) done at [facility]: No acute intracranial findings. MRI brain ( wo) 11/17/2021: No acute intracranial abnormality. 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 It may be artifactual or represent a mass lesion. Evaluation with postcontrast enhanced MRI brain with thin section imaging through the skull base is recommended. MRA head (11/17/2021): No major arterial stenosis or occlusion seen. Carotid Dopplers (11/17/21): Mild <50% stenosis of bilateral internal carotid arteries. Patent vertebral arteries bilaterally with antegrade flow. MRI brain (w/wo) 12/17/2021: 11 x 4 mm enhancing mass along the left trigeminal nerve, minimally extending into prepontine cistern, likely representing schwannoma. No acute abnormality seen in the brain. Impression and Plan: Ms. Elieser Benavides is a 80 y.o. female with   New onset headache with change in pattern of headaches superimposed on intermittent chronic migrainous attacks; confusional headaches with visual hallucinations; headaches are tolerable on Depakote; Wants meclizine prn vertigo and vpa for ha prophylaxis and tizandine prn m. spasms;   to continue the same. Abnl MRI Brain with enhancing mass along the left trigeminal nerve likely representing schwannoma; patient has upcoming appointment with neurosurgeon, Dr. Karely Milligan on 2/1/2022  Patient information handout is provided regarding trigeminal schwannoma.

## 2022-02-01 ENCOUNTER — HOSPITAL ENCOUNTER (OUTPATIENT)
Dept: GENERAL RADIOLOGY | Age: 87
Discharge: HOME | End: 2022-02-03
Payer: MEDICARE

## 2022-02-01 ENCOUNTER — OFFICE VISIT (OUTPATIENT)
Dept: NEUROSURGERY | Age: 87
End: 2022-02-01
Payer: MEDICARE

## 2022-02-01 ENCOUNTER — HOSPITAL ENCOUNTER (OUTPATIENT)
Age: 87
Discharge: HOME | End: 2022-02-03
Payer: MEDICARE

## 2022-02-01 VITALS
WEIGHT: 155 LBS | SYSTOLIC BLOOD PRESSURE: 139 MMHG | HEIGHT: 61 IN | OXYGEN SATURATION: 92 % | BODY MASS INDEX: 29.27 KG/M2 | HEART RATE: 65 BPM | DIASTOLIC BLOOD PRESSURE: 65 MMHG

## 2022-02-01 DIAGNOSIS — G44.86 CERVICOGENIC HEADACHE: ICD-10-CM

## 2022-02-01 DIAGNOSIS — D36.10 SCHWANNOMA: Primary | ICD-10-CM

## 2022-02-01 PROCEDURE — 1090F PRES/ABSN URINE INCON ASSESS: CPT | Performed by: NEUROLOGICAL SURGERY

## 2022-02-01 PROCEDURE — 1123F ACP DISCUSS/DSCN MKR DOCD: CPT | Performed by: NEUROLOGICAL SURGERY

## 2022-02-01 PROCEDURE — 1036F TOBACCO NON-USER: CPT | Performed by: NEUROLOGICAL SURGERY

## 2022-02-01 PROCEDURE — G8484 FLU IMMUNIZE NO ADMIN: HCPCS | Performed by: NEUROLOGICAL SURGERY

## 2022-02-01 PROCEDURE — 4040F PNEUMOC VAC/ADMIN/RCVD: CPT | Performed by: NEUROLOGICAL SURGERY

## 2022-02-01 PROCEDURE — 72050 X-RAY EXAM NECK SPINE 4/5VWS: CPT

## 2022-02-01 PROCEDURE — G8417 CALC BMI ABV UP PARAM F/U: HCPCS | Performed by: NEUROLOGICAL SURGERY

## 2022-02-01 PROCEDURE — 99204 OFFICE O/P NEW MOD 45 MIN: CPT | Performed by: NEUROLOGICAL SURGERY

## 2022-02-01 PROCEDURE — G8427 DOCREV CUR MEDS BY ELIG CLIN: HCPCS | Performed by: NEUROLOGICAL SURGERY

## 2022-02-01 NOTE — PROGRESS NOTES
Department of Neurosurgery                                                 New patient clinic note      Reason for Consult:  Schwannoma of cranial nerve  Requesting Physician:  Janet Archuleta MD  Neurosurgeon: Darryl Wyman DO      History Obtained From:  Patient    CHIEF COMPLAINT:         Schwannoma of cranial nerve    HISTORY OF PRESENT ILLNESS:       The patient is a 80 y.o. female who presents for consult for schwannoma of cranial nerve, newly onset headaches, and abnormal MRI. She complains of neck stiffness and chronic nauseating migraines since being a teenager, but now she reports they are worsening, more frequent, and more severe more consistently in the left occipital cervical junction. The region is not atypical for her. She denies numbness and tingling. She admits to pain in the left side of her neck beneath the base of the skull when rotating her neck in any direction. Looking up nauseates her. She has limited ROM in her neck. Her eyes tear up intermittently greater in the left eye than the right. She mobilizes in a wheelchair due to knee surgeries and injury.     PAST MEDICAL HISTORY :       Past Medical History:        Diagnosis Date    Allergic rhinitis     Angina pectoris ([facility]     CAD (coronary artery disease)     Congenital heart disease     COPD (chronic obstructive pulmonary disease) ([facility])     Headache(784.0)     Hypothyroidism     Obesity     Peptic ulcer     Pure hypercholesterolemia 6/7/2017    Urinary incontinence        Past Surgical History:        Procedure Laterality Date    APPENDECTOMY      KNEE ARTHROSCOPY         Social History:   Social History     Socioeconomic History    Marital status: Single     Spouse name: Not on file    Number of children: Not on file    Years of education: Not on file    Highest education level: Not on file   Occupational History    Not on file   Tobacco Use    Smoking status: Never Smoker    Smokeless tobacco: Never Used   Vaping Use    Vaping Use: Never used   Substance and Sexual Activity    Alcohol use: No    Drug use: No    Sexual activity: Never   Other Topics Concern    Not on file   Social History Narrative    Not on file     Social Determinants of Health     Financial Resource Strain: Low Risk     Difficulty of Paying Living Expenses: Not hard at all   Food Insecurity: No Food Insecurity    Worried About Running Out of Food in the Last Year: Never true    [address] in the Last Year: Never true   Transportation Needs:     Lack of Transportation (Medical): Not on file    Lack of Transportation (Non-Medical): Not on file   Physical Activity:     Days of Exercise per Week: Not on file    Minutes of Exercise per Session: Not on file   Stress:     Feeling of Stress : Not on file   Social Connections:     Frequency of Communication with Friends and Family: Not on file    Frequency of Social Gatherings with Friends and Family: Not on file    Attends Gnosticist Services: Not on file    Active Member of [address] [place] [address] or Organizations: Not on file    Attends Club or Organization Meetings: Not on file    Marital Status: Not on file   Intimate Partner Violence:     Fear of Current or Ex-Partner: Not on file    Emotionally Abused: Not on file    Physically Abused: Not on file    Sexually Abused: Not on file   Housing Stability:     Unable to Pay for Housing in the Last Year: Not on file    Number of [place] in the Last Year: Not on file    Unstable Housing in the Last Year: Not on file       Family History:       Problem Relation Age of Onset    Stroke Mother     Other Father     Cancer Sister     Cancer Brother        Allergies:  Seasonal and Shellfish-derived products    Home Medications:  Prior to Admission medications    Medication Sig Start Date End Date Taking?  Authorizing Provider   tiZANidine (ZANAFLEX) 2 MG tablet Take 1 tablet by mouth nightly as needed (MUSCLE SPASM) 1/11/22 Yes Heber Oloms MD   divalproex (DEPAKOTE) 125 MG DR tablet Take one tab twice daily for headache 1/11/22  Yes Heber Olmos MD   meclizine (ANTIVERT) 25 MG tablet Take one tab every 8 hr as needed for vertigo with nausea 1/11/22  Yes Heber Olmos MD   ibuprofen (ADVIL;MOTRIN) 400 MG tablet Take 1.5 tablets by mouth every 8 hours as needed for Pain 3/11/19  Yes Abel Aragon DO       Current Medications:   No current facility-administered medications for this visit. REVIEW OF SYSTEMS:       CONSTITUTIONAL: negative for fatigue and malaise   EYES: negative for double vision and photophobia    HEENT: negative for tinnitus and sore throat   RESPIRATORY: negative for cough, shortness of breath   CARDIOVASCULAR: negative for chest pain, palpitations   GASTROINTESTINAL: negative for nausea, vomiting   GENITOURINARY: negative for incontinence   MUSCULOSKELETAL: negative for neck or back pain   NEUROLOGICAL: negative for seizures   PSYCHIATRIC: negative for agitated     Review of systems otherwise negative.     PHYSICAL EXAM:       /65   Pulse 65   Ht 5' 1\" (1.549 m)   Wt 155 lb (70.3 kg)   SpO2 92%   BMI 29.29 kg/m²     Gen: NAD, comfortable  HEENT: moist mucus membranes  Cardio: RRR  Pulm: chest rise symmetrically  GI: abd soft  Ext: no edema  Skin: warm    Neuro:    AOX3  CN 2-12 grossly intact  Speech articulate  Motor 5/5  Sensation symmetrical   No botello or clonus    LABS AND IMAGING:     CBC with Differential:    Lab Results   Component Value Date    WBC 10.3 09/23/2020    RBC 4.46 09/23/2020    HGB 14.3 09/23/2020    HCT 43.4 09/23/2020     09/23/2020    MCV 97.3 09/23/2020    MCH 32.1 09/23/2020    MCHC 32.9 09/23/2020    RDW 12.3 09/23/2020    LYMPHOPCT 15 09/23/2020    MONOPCT 10 09/23/2020    BASOPCT 0 09/23/2020    MONOSABS 1.07 09/23/2020    LYMPHSABS 1.50 09/23/2020    EOSABS <0.03 09/23/2020    BASOSABS <0.03 09/23/2020    DIFFTYPE NOT REPORTED 09/23/2020     BMP:    Lab Results   Component Value Date     09/23/2020    K 3.8 09/23/2020     09/23/2020    CO2 23 09/23/2020    BUN 9 09/23/2020    LABALBU 4.0 08/23/2018    CREATININE 0.57 12/17/2021    CREATININE 0.71 09/23/2020    CALCIUM 9.6 09/23/2020    GFRAA >60 09/23/2020    LABGLOM >60 12/17/2021    GLUCOSE 106 09/23/2020       Radiology Review:   MRI brain W WO contrast  12/17/2021  Official read:  1. 11 x 4 mm enhancing mass along the left trigeminal nerve, minimally  extending into the prepontine cistern, likely representing a schwannoma. 2. No acute abnormality seen in the brain. 3. Tortuous left vertebral artery exerts mass effect on the ventral medulla. While often seen as an incidental finding, such anatomic variation may be  associated with clinical presentation of dizziness, vertigo, imbalance and  ataxia. My read:  Small trigeminal mass    ASSESSMENT AND PLAN:       Patient Active Problem List   Diagnosis    Dizziness    Essential hypertension    Subcortical microvascular ischemic occlusive disease    Pure hypercholesterolemia    History of myasthenia gravis    Personal history of systemic lupus erythematosus (SLE) ([facility] [address]    Migraine without aura and without status migrainosus, not intractable    Primary osteoarthritis of knee    Cancer, skin, squamous cell    Actinic keratosis    History of TIAs    History of head injury       Schwannoma  -     MRI BRAIN W WO CONTRAST; Future  Cervicogenic headache  -     XR CERVICAL SPINE (4-5 VIEWS); Future  -     CT CERVICAL SPINE WO CONTRAST; Future       A/P:  This is a 80 y.o. female with    Diagnosis Orders   1. Schwannoma  MRI BRAIN W WO CONTRAST   2. Cervicogenic headache  XR CERVICAL SPINE (4-5 VIEWS)    CT CERVICAL SPINE WO CONTRAST       I thoroughly discussed details of diagnosis, natural histories of disease, and treatment options. I used her imaging to depict the diagnosis to the patient.      I advised the patient

## 2022-02-09 ENCOUNTER — OFFICE VISIT (OUTPATIENT)
Dept: ORTHOPEDIC SURGERY | Age: 87
End: 2022-02-09
Payer: MEDICARE

## 2022-02-09 VITALS — BODY MASS INDEX: 29.27 KG/M2 | HEIGHT: 61 IN | WEIGHT: 155 LBS

## 2022-02-09 DIAGNOSIS — M17.0 PRIMARY OSTEOARTHRITIS OF BOTH KNEES: Primary | ICD-10-CM

## 2022-02-09 PROCEDURE — 20610 DRAIN/INJ JOINT/BURSA W/O US: CPT | Performed by: PHYSICIAN ASSISTANT

## 2022-02-09 NOTE — PROGRESS NOTES
[address]  [address] [facility] [address]  Dept: [phone]  Dept Fax: [phone]        Ambulatory Follow Up      Subjective:   Princess Quevedo is a 80y.o. year old female who presents to our office today for routine followup regarding her   1. Primary osteoarthritis of both knees    . Chief Complaint   Patient presents with    Follow-up     B knee pain        HPI Princess Quevedo presents to the office today for follow-up after corticosteroid  injection of the bilateral knee on 11/8/2021. She notes improvement in her discomfort with the injections. The patient notes that she is interested in repeat bilateral knee injections at this time. She also indicated that she would be interested in Visco supplemental injections. We discussed that if she is interested that she should call our office to schedule those. She denies numbness and or tingling in the bilateral knee at this time. Review of Systems   Constitutional: Negative for activity change and fever. HENT: Negative for sneezing. Respiratory: Negative for cough and shortness of breath. Cardiovascular: Negative for chest pain. Gastrointestinal: Negative for vomiting. Musculoskeletal: Positive for arthralgias (Bilateral knee). Negative for joint swelling and myalgias. Skin: Negative for color change. Neurological: Negative for weakness and numbness. Psychiatric/Behavioral: Negative for sleep disturbance. Objective :   Ht 5' 1\" (1.549 m)   Wt 155 lb (70.3 kg)   BMI 29.29 kg/m²  Body mass index is 29.29 kg/m². General: Princess Quevedo is a 80 y.o. female who is alert and oriented and sitting comfortably in our office. Ortho Exam  MS:  The patient ambulates with a 2-wheeled walker and a mild limp to the right lower extremity. Evaluation of the Bilateral knee reveals no significant outward deformity.   There is no erythema, skin warmth, skin lesions, or signs of infection appreciated. There is tenderness over the medial and lateral joint line with palpation, bilaterally. There is a no appreciable knee effusion noted bilaterally. Range of motion of the Bilateral knee is 10-90. No instability of the knee is appreciated at 0 and 30° of flexion, bilaterally. No calf tenderness is noted, bilaterally. There is a negative hip log roll and Stinchfield test on the Bilateral hip. Motor, sensory, vascular examination to the Bilateral lower extremity is intact. Patient has nearly full range of motion of the Bilateral ankle. Neuro: alert and oriented to person and place. Eyes: Extra-ocular muscles intact  Mouth: Oral mucosa moist. No perioral lesions  Pulm: Respirations unlabored and regular. Symmetric chest excursion without outward deformity is noted. Skin: warm, well perfused  Psych:   Patient has good fund of knowledge and displays understanging of exam, diagnosis, and plan. Radiology:     XR KNEE LEFT - 11/23/2020  History:   Left knee pain       Findings:   Standing AP/Lateral/Tunnel/Merchant view xrays of the Left    done in the office today shows severe  medial joint space narrowing,    tricompartmental osteophytosis, joint line sclerosis medially.    No    evidence of fracture, subluxation, dislocation, radioopaque foreign    body/tumor is noted.  Lateral subluxation of the tibia is appreciated.       Impression:   Left knee severe  degenerative changes as described above.        XR KNEE RIGHT - 11/23/2020  History:   Right knee pain       Findings:   Standing AP/Lateral/Tunnel/Merchant view xrays of the Right    done in the office today shows severe  medial joint space narrowing,    tricompartmental osteophytosis, joint line sclerosis medially.    No    evidence of fracture, subluxation, dislocation, radioopaque foreign    body/tumor is noted.  Lateral subluxation of the tibia is appreciated.       Impression:   Right knee severe  degenerative changes as described above. Procedure:  KNEE INJECTION PROCEDURE NOTE:  The patient was identified. Verbal consent was obtained to proceed with a right knee injection. The right knee was confirmed with the patient. After a sterile prep with Betadine the knee was injected using a lateral joint line approach with a mixture of  2mL of 0.25% Marcaine and 80 mg of Depo-Medrol. Patient tolerated the procedure well without post injection complications. I instructed the patient to call our office immediately if they have any swelling or increased pain at the injection site.     KNEE INJECTION PROCEDURE NOTE:  The patient was identified. Verbal consent was obtained to proceed with a left knee injection. The left knee was confirmed with the patient. After a sterile prep with Betadine the knee was injected using a lateral joint line approach with a mixture of  2mL of 0.25% Marcaine and 80 mg of Depo-Medrol. Patient tolerated the procedure well without post injection complications. I instructed the patient to call our office immediately if they have any swelling or increased pain at the injection site. Assessment:      1. Primary osteoarthritis of both knees       Plan:       Patient is here for bilateral knee pain due to severe osteoarthritis. Due to the patient's age she is not interested in any surgical intervention for her knee discomfort and she would like to continue conservative treatment including bilateral knee injections. The patient was given bilateral knee corticosteroid injections today in office. She tolerated the procedures without complication. We did discuss the option for bilateral knee Visco supplemental injections in the future. Due to the patient's current insurance she does not need a prior authorization. Patient may call the office for bilateral knee gel injections when she becomes interested. I would like to do a Durolane or Monovisc if available.      To the patient's next appointment which she will need updated bilateral knee x-rays. She was instructed to call our office with any questions or concerns prior to her next appointment. She voiced understanding. Follow up:Return if symptoms worsen or fail to improve. No orders of the defined types were placed in this encounter. No orders of the defined types were placed in this encounter. This note is created with the assistance of a speech recognition program.  While intending to generate a document that actually reflects the content of the visit, the document can still have some errors including those of syntax and sound a like substitutions which may escape proof reading. In such instances, actual meaning can be extrapolated by contextual diversion.      Electronically signed by Shahab John PA-C on 2/10/2022 at 2:14 PM

## 2022-02-10 ASSESSMENT — ENCOUNTER SYMPTOMS
COLOR CHANGE: 0
SHORTNESS OF BREATH: 0
COUGH: 0
VOMITING: 0

## 2022-02-15 ENCOUNTER — HOSPITAL ENCOUNTER (OUTPATIENT)
Dept: CT IMAGING | Age: 87
Discharge: HOME | End: 2022-02-17
Payer: MEDICARE

## 2022-02-15 ENCOUNTER — HOSPITAL ENCOUNTER (OUTPATIENT)
Dept: MRI IMAGING | Age: 87
Discharge: HOME | End: 2022-02-17
Payer: MEDICARE

## 2022-02-15 DIAGNOSIS — D36.10 SCHWANNOMA: ICD-10-CM

## 2022-02-15 DIAGNOSIS — G44.86 CERVICOGENIC HEADACHE: ICD-10-CM

## 2022-02-15 PROCEDURE — A9576 INJ PROHANCE MULTIPACK: HCPCS | Performed by: NEUROLOGICAL SURGERY

## 2022-02-15 PROCEDURE — 70553 MRI BRAIN STEM W/O & W/DYE: CPT

## 2022-02-15 PROCEDURE — 82565 ASSAY OF CREATININE: CPT

## 2022-02-15 PROCEDURE — 84520 ASSAY OF UREA NITROGEN: CPT

## 2022-02-15 PROCEDURE — 72125 CT NECK SPINE W/O DYE: CPT

## 2022-02-15 PROCEDURE — 6360000004 HC RX CONTRAST MEDICATION: Performed by: NEUROLOGICAL SURGERY

## 2022-02-15 RX ORDER — SODIUM CHLORIDE 0.9 % (FLUSH) 0.9 %
10 SYRINGE (ML) INJECTION PRN
Status: DISCONTINUED | OUTPATIENT
Start: 2022-02-15 | End: 2022-02-18 | Stop reason: HOSPADM

## 2022-02-15 RX ADMIN — GADOTERIDOL 12 ML: 279.3 INJECTION, SOLUTION INTRAVENOUS at 15:59

## 2022-02-16 LAB
GFR NON-AFRICAN AMERICAN: NORMAL ML/MIN
GFR SERPL CREATININE-BSD FRML MDRD: NORMAL ML/MIN
GFR SERPL CREATININE-BSD FRML MDRD: NORMAL ML/MIN/{1.73_M2}
POC BUN: 16 MG/DL (ref 8–26)
POC CREATININE: 0.89 MG/DL (ref 0.51–1.19)

## 2022-02-22 RX ORDER — METHYLPREDNISOLONE ACETATE 80 MG/ML
80 INJECTION, SUSPENSION INTRA-ARTICULAR; INTRALESIONAL; INTRAMUSCULAR; SOFT TISSUE ONCE
Status: COMPLETED | OUTPATIENT
Start: 2022-02-22 | End: 2022-02-22

## 2022-02-22 RX ORDER — BUPIVACAINE HYDROCHLORIDE 2.5 MG/ML
2 INJECTION, SOLUTION INFILTRATION; PERINEURAL ONCE
Status: COMPLETED | OUTPATIENT
Start: 2022-02-22 | End: 2022-02-22

## 2022-02-22 RX ADMIN — BUPIVACAINE HYDROCHLORIDE 5 MG: 2.5 INJECTION, SOLUTION INFILTRATION; PERINEURAL at 11:43

## 2022-02-22 RX ADMIN — METHYLPREDNISOLONE ACETATE 80 MG: 80 INJECTION, SUSPENSION INTRA-ARTICULAR; INTRALESIONAL; INTRAMUSCULAR; SOFT TISSUE at 11:43

## 2022-02-22 RX ADMIN — BUPIVACAINE HYDROCHLORIDE 5 MG: 2.5 INJECTION, SOLUTION INFILTRATION; PERINEURAL at 11:42

## 2022-03-14 ENCOUNTER — OFFICE VISIT (OUTPATIENT)
Dept: NEUROLOGY | Age: 87
End: 2022-03-14
Payer: MEDICARE

## 2022-03-14 VITALS
BODY MASS INDEX: 27.38 KG/M2 | SYSTOLIC BLOOD PRESSURE: 134 MMHG | WEIGHT: 145 LBS | HEIGHT: 61 IN | HEART RATE: 66 BPM | DIASTOLIC BLOOD PRESSURE: 73 MMHG

## 2022-03-14 DIAGNOSIS — R41.3 MEMORY PROBLEM: ICD-10-CM

## 2022-03-14 DIAGNOSIS — G56.01 RIGHT CARPAL TUNNEL SYNDROME: ICD-10-CM

## 2022-03-14 DIAGNOSIS — D33.3 SCHWANNOMA OF CRANIAL NERVE (HCC): ICD-10-CM

## 2022-03-14 DIAGNOSIS — G43.709 CHRONIC MIGRAINE WITHOUT AURA WITHOUT STATUS MIGRAINOSUS, NOT INTRACTABLE: Primary | ICD-10-CM

## 2022-03-14 PROCEDURE — 1036F TOBACCO NON-USER: CPT | Performed by: PSYCHIATRY & NEUROLOGY

## 2022-03-14 PROCEDURE — 99214 OFFICE O/P EST MOD 30 MIN: CPT | Performed by: PSYCHIATRY & NEUROLOGY

## 2022-03-14 PROCEDURE — G8417 CALC BMI ABV UP PARAM F/U: HCPCS | Performed by: PSYCHIATRY & NEUROLOGY

## 2022-03-14 PROCEDURE — 1123F ACP DISCUSS/DSCN MKR DOCD: CPT | Performed by: PSYCHIATRY & NEUROLOGY

## 2022-03-14 PROCEDURE — G8484 FLU IMMUNIZE NO ADMIN: HCPCS | Performed by: PSYCHIATRY & NEUROLOGY

## 2022-03-14 PROCEDURE — 1090F PRES/ABSN URINE INCON ASSESS: CPT | Performed by: PSYCHIATRY & NEUROLOGY

## 2022-03-14 PROCEDURE — 4040F PNEUMOC VAC/ADMIN/RCVD: CPT | Performed by: PSYCHIATRY & NEUROLOGY

## 2022-03-14 PROCEDURE — G8427 DOCREV CUR MEDS BY ELIG CLIN: HCPCS | Performed by: PSYCHIATRY & NEUROLOGY

## 2022-03-14 RX ORDER — DIVALPROEX SODIUM 125 MG/1
TABLET, DELAYED RELEASE ORAL
Qty: 60 TABLET | Refills: 3 | Status: SHIPPED | OUTPATIENT
Start: 2022-03-14 | End: 2022-05-27

## 2022-03-14 NOTE — PROGRESS NOTES
HEENT [x] Hearing Loss  [] Visual Disturbance  [x] Tinnitus  [] Eye pain   Respiratory [] Shortness of Breath  [] Cough  [x] Snoring   Cardiovascular [x] Chest Pain  [] Palpitations  [x] Lightheaded   GI [x] Constipation  [] Diarrhea  [x] Swallowing change  [x] Nausea/vomiting    [x] Urinary Frequency  [x] Urinary Urgency   Musculoskeletal [x] Neck pain  [] Back pain  [x] Muscle pain  [x] Restless legs   Dermatologic [] Skin changes   Neurologic [x] Memory loss/confusion  [] Seizures  [x] Trouble walking or imbalance  [x] Dizziness  [] Sleep disturbance  [x] Weakness  [x] Numbness  [] Tremors  [] Speech Difficulty  [x] Headaches  [x] Light Sensitivity  [x] Sound Sensitivity   Endocrinology [x]Excessive thirst  []Excessive hunger   Psychiatric [x] Anxiety/Depression  [] Hallucination   Allergy/immunology []Hives/environmental allergies   Hematologic/lymph [] Abnormal bleeding  [] Abnormal bruising

## 2022-03-14 NOTE — PROGRESS NOTES
NEUROLOGY FOLLOW-UP VISIT   Patient Name:       Jeff Carmona  :        1930  Clinic Visit Date:    3/14/2022  LOV: 21      Dear Dr. Christine Lovett MD     I saw Ms. Jeff Carmona in follow-up today in continuation of neurologic care. As you know she  is a 80 y.o. right handed  female initially seen in neurology consultation on 10/26/2021 for new onset headaches with change in pattern of headaches superimposed on chronic migraine headaches. She is found to have left trigeminal nerve schwannoma for which she was evaluated by neurosurgeon, Dr. cA Su and she was advised no acute intervention at this point of time; to continue monitoring. Patient comes to clinic stating that Depakote has been \"extremely helpful for headaches\". She is tolerating Depakote well without any adverse effects. But she also stated that she has been having nocturnal paresthesias/dysesthesias in right hand digits with some relief by shaking her hand. This has been occurring on most of the nights. Denied neck pain radiating down right upper extremity. Denied bladder dysfunction. Initial visit on 10/26/2021:  Patient's son accompanied the patient to the clinic stating \"mom had a lot of head injuries in the past\". She has fallen 10 feet during childhood. He also added Brisa Sharma has couple of TIAs\" few years ago. She stated that for the past several weeks; headaches are occurring with increasing frequency and severity. Her son also stated that she does have hx of headaches in the past but \"not this frequent and continuous\". Recently she was seen in ED for continuous and persistent bifrontal and temporal headache of severe intensity with throbbing pain associated with photophobia, phonophobia, nausea and vomiting. She does have intermittent aura. Also has occasional tinnitus but denies hearing loss. She also has upcoming appointment with ophthalmologist for blurred vision and floaters.   She also gets dizzy with the headaches and dizziness \"goes away with headache resolution\". She also has neck stiffness with muscle spasms and trouble moving neck sideways. Denied radiating pain and paresthesias. Denied bladder dysfunction. Denied recent febrile illnesses. She has been taking ibuprofen for headache relief. As per ED note; patient has had visual hallucinations described as \"seeing writing on the wall\" and \"a hand pointing\". She was noted to be confused with that headache; ibuprofen has helped with improvement in mentation. Patient's son also reports that patient has diagnosis of myasthenia gravis in the past and it has been in remission for few years. She was on Mestinon in the past.  Her symptoms of myasthenia consisted of double vision and neck weakness \"trouble holding her neck\". She has not had any symptoms to suggest myasthenia gravis for several years. Review of systems done by staff reviewed and pertinent positives include  Headaches, light sensitivity, sound sensitivity, fatigue, visual disturbance, tinnitus, numbness/tingling in right hand digits as stated above. Current Outpatient Medications on File Prior to Visit   Medication Sig Dispense Refill    tiZANidine (ZANAFLEX) 2 MG tablet Take 1 tablet by mouth nightly as needed (MUSCLE SPASM) 20 tablet 1    divalproex (DEPAKOTE) 125 MG DR tablet Take one tab twice daily for headache 60 tablet 3    meclizine (ANTIVERT) 25 MG tablet Take one tab every 8 hr as needed for vertigo with nausea 20 tablet 1    ibuprofen (ADVIL;MOTRIN) 400 MG tablet Take 1.5 tablets by mouth every 8 hours as needed for Pain 90 tablet 3     No current facility-administered medications on file prior to visit. Allergies: Alvaro Kothari is allergic to seasonal and shellfish-derived products.     Past Medical History:   Diagnosis Date    Allergic rhinitis     Angina pectoris (HCC)     CAD (coronary artery disease)     Congenital heart disease     COPD \"Monday, march 2023,    Maximum score 5 Score 5 Where are we: State, county, town, hospital, floor? Maximum score 3 Score 3 Name 3 objects: clock, guitar, globe. .  Ask patient to repeat 3 objects. Maximum score 5 Score 5 Serial sevens from 100:93, 86, 79, 72, 65   Maximum score 3 Score 2 Recall 3 objects   Maximum score 2 Score 2 Name a pencil and watch. Maximum score 1 Score 1 Repeat the following: No ifs ands or buts.      Maximum score 3 Score 3 Follow 3 stage command take a paper in your hand, fold it in half, and put it on the floor. Maximum score 1  Score 1 Read and obey the following: Close your eyes     Maximum score 1 Score 1 Write a sentence. Maximum score 1 Score 0 Copy a design below. Max 30   Patients score 28        Diagnostic data reviewed with the patient:     Lab Results   Component Value Date    WBC 10.3 09/23/2020    HGB 14.3 09/23/2020    HCT 43.4 09/23/2020    MCV 97.3 09/23/2020     09/23/2020    LYMPHOPCT 15 (L) 09/23/2020    RBC 4.46 09/23/2020    MCH 32.1 09/23/2020    MCHC 32.9 09/23/2020    RDW 12.3 09/23/2020       Lab Results   Component Value Date     09/23/2020    K 3.8 09/23/2020     09/23/2020    CO2 23 09/23/2020    BUN 9 09/23/2020    CREATININE 0.89 02/15/2022    GLUCOSE 106 (H) 09/23/2020    CALCIUM 9.6 09/23/2020    PROT 7.4 08/23/2018    LABALBU 4.0 08/23/2018    BILITOT 0.59 08/23/2018    ALKPHOS 104 08/23/2018    AST 26 08/23/2018    ALT 46 (H) 08/23/2018    LABGLOM NOT REPORTED 02/15/2022    GFRAA >60 09/23/2020       No results found for: PHENYTOIN, PHENYF, PHENOBARB, VALPROATE, CBMZ    Lab Results   Component Value Date    CHOL 185 08/23/2018    LDLCHOLESTEROL 107 08/23/2018    HDL 59 08/23/2018    TRIG 93 08/23/2018    ALT 46 (H) 08/23/2018    AST 26 08/23/2018    TSH 3.06 08/23/2018    INR 1.0 06/08/2017    [id number] 08/23/2018     CT head (10/14/2021) done at [facility]: No acute intracranial findings.     MRI brain ( wo) 11/17/2021: No acute intracranial abnormality. 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 It may be artifactual or represent a mass lesion. Evaluation with postcontrast enhanced MRI brain with thin section imaging through the skull base is recommended. MRA head (11/17/2021): No major arterial stenosis or occlusion seen. Carotid Dopplers (11/17/21): Mild <50% stenosis of bilateral internal carotid arteries. Patent vertebral arteries bilaterally with antegrade flow. MRI brain (w/wo) 12/17/2021: 11 x 4 mm enhancing mass along the left trigeminal nerve, minimally extending into prepontine cistern, likely representing schwannoma. No acute abnormality seen in the brain. Impression and Plan: Ms. Princess Quevedo is a 80 y.o. female with   Chronic migraine headaches without aura; not intractable; significantly improved; to continue Depakote 125 mg bid. Right carpal tunnel symptomatology: To use nightly wrist splint  Left trigeminal nerve schwannoma: To continue follow-ups with neurosurgeon, Dr. Carlos Harmon. History of head injury  History of recurrent TIAs with bilateral carotid artery stenosis and hyperlipidemia  History of myasthenia gravis, generalized as per description; in remission for several years; was on Mestinon until few years ago  Follow up visit in 3-4 months. This note was partially created using voice recognition software and is inherently subject to errors including those of syntax and \"sound alike\" substitutions which may escape proofreading. In such instances, original meaning may be extrapolated by contextual derivation.

## 2022-03-27 PROBLEM — G43.709 CHRONIC MIGRAINE WITHOUT AURA WITHOUT STATUS MIGRAINOSUS, NOT INTRACTABLE: Status: ACTIVE | Noted: 2017-06-26

## 2022-03-27 PROBLEM — D33.3 SCHWANNOMA OF CRANIAL NERVE (HCC): Status: ACTIVE | Noted: 2022-03-27

## 2022-05-13 NOTE — TELEPHONE ENCOUNTER
Salvatore Mackenzie calling from [address] is requesting PT to come first then she will do speech therapy afterwards. Can you please order a PT order.  Order can be faxed to [phone] Yes

## 2022-05-19 ENCOUNTER — TELEPHONE (OUTPATIENT)
Dept: FAMILY MEDICINE CLINIC | Age: 87
End: 2022-05-19

## 2022-05-19 NOTE — TELEPHONE ENCOUNTER
----- Message from [address] [employer] [place] sent at 5/19/2022 12:10 PM EDT -----  Subject: Hospital Follow Up    QUESTIONS  What hospital was the Patient Discharged from? Rehab  Date of Discharge? 2022-04-25  Discharge Location? Home  Reason for hospitalization as patient stated? pt son called and was trying   to schedule an appt for his mom. she fell and punctured a lung and then   was sent to rehab and is now home. Needs an appt before the 25th as that   is what papers say. Please call him to discuss and get scheduled. What question does the patient have, if applicable? call the cell 1st if   you do not reach there call house phone then you can leave a message. Shanelle should be also faxing you something for authorization   so Medicare will cover her therapy   ---------------------------------------------------------------------------  --------------  [address]  What is the best way for the office to contact you? Do not leave any   message, patient will call back for answer  Preferred Call Back Phone Number? [phone]  ---------------------------------------------------------------------------  --------------  SCRIPT ANSWERS  Relationship to Patient? Other  Representative Name? Jeison Wakefield  Is the Representative on the appropriate HIPAA document in Epic?  Yes

## 2022-05-27 ENCOUNTER — OFFICE VISIT (OUTPATIENT)
Dept: FAMILY MEDICINE CLINIC | Age: 87
End: 2022-05-27
Payer: MEDICARE

## 2022-05-27 VITALS
DIASTOLIC BLOOD PRESSURE: 70 MMHG | HEART RATE: 68 BPM | WEIGHT: 150 LBS | BODY MASS INDEX: 28.34 KG/M2 | TEMPERATURE: 97.2 F | SYSTOLIC BLOOD PRESSURE: 116 MMHG | OXYGEN SATURATION: 97 %

## 2022-05-27 DIAGNOSIS — S22.49XD CLOSED FRACTURE OF MULTIPLE RIBS WITH ROUTINE HEALING, UNSPECIFIED LATERALITY, SUBSEQUENT ENCOUNTER: ICD-10-CM

## 2022-05-27 DIAGNOSIS — R53.1 GENERALIZED WEAKNESS: ICD-10-CM

## 2022-05-27 DIAGNOSIS — Z09 HOSPITAL DISCHARGE FOLLOW-UP: Primary | ICD-10-CM

## 2022-05-27 DIAGNOSIS — Z74.2 NEED FOR HOME HEALTH CARE: ICD-10-CM

## 2022-05-27 PROCEDURE — 1123F ACP DISCUSS/DSCN MKR DOCD: CPT | Performed by: NURSE PRACTITIONER

## 2022-05-27 PROCEDURE — 1036F TOBACCO NON-USER: CPT | Performed by: NURSE PRACTITIONER

## 2022-05-27 PROCEDURE — G8427 DOCREV CUR MEDS BY ELIG CLIN: HCPCS | Performed by: NURSE PRACTITIONER

## 2022-05-27 PROCEDURE — G8417 CALC BMI ABV UP PARAM F/U: HCPCS | Performed by: NURSE PRACTITIONER

## 2022-05-27 PROCEDURE — 1111F DSCHRG MED/CURRENT MED MERGE: CPT | Performed by: NURSE PRACTITIONER

## 2022-05-27 PROCEDURE — 99374 HOME HEALTH CARE SUPERVISION: CPT | Performed by: NURSE PRACTITIONER

## 2022-05-27 PROCEDURE — 1090F PRES/ABSN URINE INCON ASSESS: CPT | Performed by: NURSE PRACTITIONER

## 2022-05-27 PROCEDURE — 99214 OFFICE O/P EST MOD 30 MIN: CPT | Performed by: NURSE PRACTITIONER

## 2022-05-27 RX ORDER — OXYCODONE HYDROCHLORIDE 5 MG/1
TABLET ORAL
COMMUNITY
Start: 2022-04-25 | End: 2022-05-27 | Stop reason: ALTCHOICE

## 2022-05-27 ASSESSMENT — PATIENT HEALTH QUESTIONNAIRE - PHQ9
SUM OF ALL RESPONSES TO PHQ9 QUESTIONS 1 & 2: 0
1. LITTLE INTEREST OR PLEASURE IN DOING THINGS: 0
2. FEELING DOWN, DEPRESSED OR HOPELESS: 0
SUM OF ALL RESPONSES TO PHQ QUESTIONS 1-9: 0

## 2022-05-27 NOTE — PROGRESS NOTES
Post-Discharge Transitional Care Management Progress Note      Mayra Dasilva   YOB: 1930    Date of Office Visit:  5/27/2022  Date of Hospital Admission: 03/20/2022 [facility] 03/25/2022 [facility]  Date of Hospital Discharge: 03/25/2022 [facility] \"couple of weeks ago\"    Care management risk score Rising risk (score 2-5) and Complex Care (Scores >=6): 0     Non face to face  following discharge, date last encounter closed (first attempt may have been earlier): *No documented post hospital discharge outreach found in the last 14 days *No documented post hospital discharge outreach found in the last 14 days    Call initiated 2 business days of discharge: *No response recorded in the last 14 days    ASSESSMENT/PLAN:   Hospital discharge follow-up  -     WI DISCHARGE MEDS RECONCILED W/ CURRENT OUTPATIENT MED LIST  Closed fracture of multiple ribs with routine healing, unspecified laterality, subsequent encounter  Generalized weakness  -     Misc. Devices (ROLLER WALKER) MISC; Disp-1 each, R-0, PrintRolling walker with seat  Dx: Unsteady gait, at high risk for falls  Need for home health care  -     Misc. Devices (ROLLER WALKER) MISC; Disp-1 each, R-0, PrintRolling walker with seat  Dx: Unsteady gait, at high risk for falls  -     [address] [place] [address] 15-29 MINUTES      Medical Decision Making: moderate complexity  Return in about 1 month (around 6/27/2022). On this date 5/27/2022 I have spent 30 minutes reviewing previous notes, test results and face to face with the patient discussing the diagnosis and importance of compliance with the treatment plan as well as documenting on the day of the visit. Subjective:   HPI:  Follow up of Hospital problems/diagnosis(es): closed fracture of multiple ribs with flail chest    Inpatient course: Discharge summary reviewed- see chart.     Interval history/Current status: Per hospital notes patient was at her son's house she lost her balance and fell hitting her side on a chair. She was unable to get up and EMS was called for transport to the hospital with left-sided rib pain. Diagnosis left-sided rib fracture with pneumothorax with flail segment on CT. Subsequent left-sided chest tube insertion. Chest tube was removed prior to discharge. She was discharged to [facility] for rehab. She presents today for follow-up of her injuries and discharge. Currently, she reports she is receiving therapy through home health care. She is living with her son who also has physical and medical issues. Due to her weakness and difficulty getting out and about she does meet criteria for home health care. Patient Active Problem List   Diagnosis    Dizziness    Essential hypertension    Subcortical microvascular ischemic occlusive disease    Pure hypercholesterolemia    History of myasthenia gravis    Personal history of systemic lupus erythematosus (SLE) (HCC)    Chronic migraine without aura without status migrainosus, not intractable    Primary osteoarthritis of knee    Cancer, skin, squamous cell    Actinic keratosis    History of TIAs    History of head injury    Schwannoma of cranial nerve ([facility] [address]       Medications listed as ordered at the time of discharge from hospital     Medication List          Accurate as of May 27, 2022  4:37 PM. If you have any questions, ask your nurse or doctor. START taking these medications    Roller Walker Misc  Rolling walker with seat    Dx:  Unsteady gait, at high risk for falls  Started by: GABBIE Medina CNP        CONTINUE taking these medications    ibuprofen 400 MG tablet  Commonly known as: ADVIL;MOTRIN  Take 1.5 tablets by mouth every 8 hours as needed for Pain     meclizine 25 MG tablet  Commonly known as: ANTIVERT  Take one tab every 8 hr as needed for vertigo with nausea        STOP taking these medications    divalproex 125 MG DR tablet  Commonly known as: Depakote  Stopped by: GABBIE Javier CNP     oxyCODONE 5 MG immediate release tablet  Commonly known as: ROXICODONE  Stopped by: GABBIE Javier CNP     tiZANidine 2 MG tablet  Commonly known as: Sunday Poll by: GABBIE Javier CNP           Where to Get Your Medications      You can get these medications from any pharmacy    Bring a paper prescription for each of these medications  · Roller Walker Misc           Medications marked \"taking\" at this time  Outpatient Medications Marked as Taking for the 5/27/22 encounter (Office Visit) with GABBIE Javier CNP   Medication Sig [address] Devices (ROLLER WALKER) MISC Rolling walker with seat    Dx: Unsteady gait, at high risk for falls 1 each 0    meclizine (ANTIVERT) 25 MG tablet Take one tab every 8 hr as needed for vertigo with nausea 20 tablet 1    ibuprofen (ADVIL;MOTRIN) 400 MG tablet Take 1.5 tablets by mouth every 8 hours as needed for Pain 90 tablet 3        Medications patient taking as of now reconciled against medications ordered at time of hospital discharge: Yes    A comprehensive review of systems was negative except for what was noted in the HPI. Objective:      · Constitutional: Erlinda Sánchez is oriented to person, place, and time. Vital signs are normal. Appears well-developed and well-nourished. · HEENT:   · Head: Normocephalic and atraumatic. Right Ear: Hearing and external ear normal.   Left Ear: Hearing and external ear normal.   · Nose:  Nares normal. Septum midline. Mucosa normal. No drainage or sinus tenderness. · Mouth/Throat: Oropharynx-no erythema, no exudate. Uvula midline, no erythema, no edema. Mucous membranes are pink and moist.   · Eyes:PERRL, EOMI, Conjunctiva normal, No discharge. · Neck: Trachea normal, full passive range of motion. Non-tender on palpation. Neck supple. No thyromegaly present.    · Cardiovascular: Normal rate, regular rhythm, S1, S2, no murmur, no gallop, no friction rub, intact distal pulses. · Pulmonary/Chest: Breath sounds are clear throughout, No respiratory distress, No wheezing, Effort normal.  Anterior left-sided chest tenderness  · Musculoskeletal: Extremities appear regular and symmetric. No evident masses, lesions, foreign bodies, or other abnormalities. No edema. No tenderness on palpation. Joints are stable. Full ROM, strength and tone are globally decreased. · Lymphadenopathy: No lymphadenopathy noted. · Neurological: Alert and oriented to person, place, and time. Normal motor function, Normal sensory function, No focal deficits noted. She has globally decreased strength-using wheelchair for mobility at this time. · Skin: Skin is warm, dry and intact. No obvious lesions on exposed skin  · Psychiatric: Normal mood and affect. Speech is normal and behavior is normal.       Nursing note and vitals reviewed. Blood pressure 116/70, pulse 68, temperature 97.2 °F (36.2 °C), temperature source Temporal, weight 150 lb (68 kg), SpO2 97 %. Body mass index is 28.34 kg/m². Wt Readings from Last 3 Encounters:   05/27/22 150 lb (68 kg)   03/14/22 145 lb (65.8 kg)   02/09/22 155 lb (70.3 kg)     BP Readings from Last 3 Encounters:   05/27/22 116/70   03/14/22 134/73   02/01/22 139/65       No results found for this visit on 05/27/22. An electronic signature was used to authenticate this note.   --Patricia Rojas, APRN - CNP

## 2022-06-13 ENCOUNTER — OFFICE VISIT (OUTPATIENT)
Dept: FAMILY MEDICINE CLINIC | Age: 87
End: 2022-06-13
Payer: MEDICARE

## 2022-06-13 VITALS
BODY MASS INDEX: 27.78 KG/M2 | OXYGEN SATURATION: 97 % | TEMPERATURE: 98.1 F | HEART RATE: 60 BPM | WEIGHT: 147 LBS | DIASTOLIC BLOOD PRESSURE: 70 MMHG | SYSTOLIC BLOOD PRESSURE: 130 MMHG

## 2022-06-13 DIAGNOSIS — J34.89 RHINORRHEA: Primary | ICD-10-CM

## 2022-06-13 DIAGNOSIS — C44.602 MALIGNANT NEOPLASM OF SKIN OF RIGHT UPPER EXTREMITY: ICD-10-CM

## 2022-06-13 PROCEDURE — 1123F ACP DISCUSS/DSCN MKR DOCD: CPT | Performed by: STUDENT IN AN ORGANIZED HEALTH CARE EDUCATION/TRAINING PROGRAM

## 2022-06-13 PROCEDURE — 1090F PRES/ABSN URINE INCON ASSESS: CPT | Performed by: STUDENT IN AN ORGANIZED HEALTH CARE EDUCATION/TRAINING PROGRAM

## 2022-06-13 PROCEDURE — G8417 CALC BMI ABV UP PARAM F/U: HCPCS | Performed by: STUDENT IN AN ORGANIZED HEALTH CARE EDUCATION/TRAINING PROGRAM

## 2022-06-13 PROCEDURE — 1036F TOBACCO NON-USER: CPT | Performed by: STUDENT IN AN ORGANIZED HEALTH CARE EDUCATION/TRAINING PROGRAM

## 2022-06-13 PROCEDURE — G8427 DOCREV CUR MEDS BY ELIG CLIN: HCPCS | Performed by: STUDENT IN AN ORGANIZED HEALTH CARE EDUCATION/TRAINING PROGRAM

## 2022-06-13 PROCEDURE — 99214 OFFICE O/P EST MOD 30 MIN: CPT | Performed by: STUDENT IN AN ORGANIZED HEALTH CARE EDUCATION/TRAINING PROGRAM

## 2022-06-13 NOTE — PROGRESS NOTES
Subjective:  Harriett Lam presents for   Chief Complaint   Patient presents with    Follow-up     ribs    Fatigue       HPI   Here for follow-up on fatigue and rib pain. States that she had a fall and ended up in the hospital.  Currently she is doing well. Does complain of rhinorrhea. No other symptoms including fever, chills, nasal congestion or sinus pain. Also states has a skin rash/lesion that is concerning for precancerous skin changes. Patient Active Problem List   Diagnosis    Dizziness    Essential hypertension    Subcortical microvascular ischemic occlusive disease    Pure hypercholesterolemia    History of myasthenia gravis    Personal history of systemic lupus erythematosus (SLE) (HCC)    Chronic migraine without aura without status migrainosus, not intractable    Primary osteoarthritis of knee    Cancer, skin, squamous cell    Actinic keratosis    History of TIAs    History of head injury    Schwannoma of cranial nerve ([address]         Review of Systems   All other systems reviewed and are negative. Objective:  Physical Exam   Vitals:   Vitals:    06/13/22 1508   BP: 130/70   Site: Left Upper Arm   Position: Sitting   Cuff Size: Medium Adult   Pulse: 60   Temp: 98.1 °F (36.7 °C)   TempSrc: Temporal   SpO2: 97%   Weight: 147 lb (66.7 kg)     Wt Readings from Last 3 Encounters:   06/13/22 147 lb (66.7 kg)   05/27/22 150 lb (68 kg)   03/14/22 145 lb (65.8 kg)     Ht Readings from Last 3 Encounters:   03/14/22 5' 1\" (1.549 m)   02/09/22 5' 1\" (1.549 m)   02/01/22 5' 1\" (1.549 m)     Body mass index is 27.78 kg/m². Physical Exam  Vitals reviewed. Constitutional:       General: She is not in acute distress. Appearance: Normal appearance. Comments: Wheelchair bound   HENT:      Mouth/Throat:      Mouth: Mucous membranes are moist.   Eyes:      Extraocular Movements: Extraocular movements intact.       Conjunctiva/sclera: Conjunctivae normal.      Pupils: Pupils are equal, round, and reactive to light. Cardiovascular:      Rate and Rhythm: Normal rate and regular rhythm. Heart sounds: Normal heart sounds. Pulmonary:      Effort: Pulmonary effort is normal.      Breath sounds: Normal breath sounds. Skin:     General: Skin is warm and dry. Findings: Erythema and rash present. No lesion. Neurological:      Mental Status: She is alert and oriented to person, place, and time. Psychiatric:         Mood and Affect: Mood normal.         Behavior: Behavior normal.         Thought Content: Thought content normal.         Judgment: Judgment normal.            Assessment/Plan:    Diagnosis Orders   1. Rhinorrhea  ipratropium (ATROVENT) 0.02 % nebulizer solution   2. Malignant neoplasm of skin of right upper extremity  Hailee Rodríguez MD, Dermatology, [facility]        Return if symptoms worsen or fail to improve. Rhinorrhea- uncertain of etiology. We will try ipratropium nasal spray. Skin lesion of right hand. Uncertain of etiology could be related to squamous cell carcinoma or basal cell. We will refer her to dermatology for evaluation and management.

## 2022-06-20 ENCOUNTER — TELEPHONE (OUTPATIENT)
Dept: FAMILY MEDICINE CLINIC | Age: 87
End: 2022-06-20

## 2022-06-20 DIAGNOSIS — N39.498 OTHER URINARY INCONTINENCE: Primary | ICD-10-CM

## 2022-06-20 RX ORDER — BROMPHENIRAMIN/PSEUDOEPHEDRINE 1-15MG/5ML
LIQUID (ML) ORAL
Qty: 48 EACH | Refills: 2 | Status: SHIPPED | OUTPATIENT
Start: 2022-06-20

## 2022-06-20 NOTE — TELEPHONE ENCOUNTER
Patient needs order for Brief she been bying over the counter and  Home Health Nurse , what to see if you can send orders over to [facility] care on [address] . Thank you

## 2022-07-05 ENCOUNTER — OFFICE VISIT (OUTPATIENT)
Dept: DERMATOLOGY | Age: 87
End: 2022-07-05
Payer: MEDICARE

## 2022-07-05 VITALS
OXYGEN SATURATION: 96 % | HEIGHT: 61 IN | HEART RATE: 59 BPM | SYSTOLIC BLOOD PRESSURE: 138 MMHG | DIASTOLIC BLOOD PRESSURE: 73 MMHG | WEIGHT: 148 LBS | TEMPERATURE: 97.4 F | BODY MASS INDEX: 27.94 KG/M2

## 2022-07-05 DIAGNOSIS — L82.1 SEBORRHEIC KERATOSES: ICD-10-CM

## 2022-07-05 DIAGNOSIS — L81.4 LENTIGINES: ICD-10-CM

## 2022-07-05 DIAGNOSIS — L82.0 INFLAMED SEBORRHEIC KERATOSIS: Primary | ICD-10-CM

## 2022-07-05 PROCEDURE — 1123F ACP DISCUSS/DSCN MKR DOCD: CPT | Performed by: PHYSICIAN ASSISTANT

## 2022-07-05 PROCEDURE — 17110 DESTRUCTION B9 LES UP TO 14: CPT | Performed by: PHYSICIAN ASSISTANT

## 2022-07-05 PROCEDURE — G8427 DOCREV CUR MEDS BY ELIG CLIN: HCPCS | Performed by: PHYSICIAN ASSISTANT

## 2022-07-05 PROCEDURE — 99202 OFFICE O/P NEW SF 15 MIN: CPT | Performed by: PHYSICIAN ASSISTANT

## 2022-07-05 PROCEDURE — 1090F PRES/ABSN URINE INCON ASSESS: CPT | Performed by: PHYSICIAN ASSISTANT

## 2022-07-05 PROCEDURE — 1036F TOBACCO NON-USER: CPT | Performed by: PHYSICIAN ASSISTANT

## 2022-07-05 PROCEDURE — G8417 CALC BMI ABV UP PARAM F/U: HCPCS | Performed by: PHYSICIAN ASSISTANT

## 2022-07-05 NOTE — PROGRESS NOTES
Dermatology Patient Note  Marcio  [address]  [address]  Dept: [phone]  Dept Fax: [phone]      VISITDATE: 7/5/2022   REFERRING PROVIDER: Luana Tavarez MD      Clinton Yusuf is a 80 y.o. female  who presents today in the office for:    New Patient (history of skin cancer) and Lesion(s) (rt arm lesion changing in shape and color)      HISTORY OF PRESENT ILLNESS:  As above. Pt states that scaly lesions on right forearm are pruritic at times, irritated when clothing rubs against them. MEDICAL PROBLEMS:  Patient Active Problem List    Diagnosis Date Noted    Schwannoma of cranial nerve ([facility] [address] 03/27/2022    History of TIAs 10/31/2021    History of head injury 10/31/2021    Cancer, skin, squamous cell 12/19/2017    Actinic keratosis 12/19/2017    Primary osteoarthritis of knee 09/26/2017    Chronic migraine without aura without status migrainosus, not intractable 06/26/2017    Essential hypertension 06/07/2017    Subcortical microvascular ischemic occlusive disease 06/07/2017    Pure hypercholesterolemia 06/07/2017    History of myasthenia gravis 06/07/2017    Personal history of [facility]matosus ([facility]) [facility] 06/07/2017    Dizziness 05/17/2017       CURRENT MEDICATIONS:   Current Outpatient Medications   Medication Sig Dispense Refill    Incontinence Supply Disposable (BRIEF OVERNIGHT LARGE) MISC Use daily 48 each 2    ipratropium (ATROVENT) 0.02 % nebulizer solution Take 2.5 mLs by nebulization every 4 hours as needed for Wheezing 2.5 mL 3    Misc. Devices (ROLLER WALKER) MISC Rolling walker with seat    Dx:  Unsteady gait, at high risk for falls 1 each 0    meclizine (ANTIVERT) 25 MG tablet Take one tab every 8 hr as needed for vertigo with nausea 20 tablet 1    ibuprofen (ADVIL;MOTRIN) 400 MG tablet Take 1.5 tablets by mouth every 8 hours as needed for Pain 90 tablet 3     No current facility-administered medications for this visit. ALLERGIES:   Allergies   Allergen Reactions    Seasonal      Certain plants    Shellfish-Derived Products Hives       SOCIAL HISTORY:  Social History     Tobacco Use    Smoking status: Never Smoker    Smokeless tobacco: Never Used   Substance Use Topics    Alcohol use: No       Pertinent ROS:  Review of Systems  Skin: Denies any new changing, growing or bleeding lesions or rashes except as described in the HPI   Constitutional: Denies fevers, chills, and malaise. PHYSICAL EXAM:   /73   Pulse 59   Temp 97.4 °F (36.3 °C) (Temporal)   Ht 5' 1\" (1.549 m)   Wt 148 lb (67.1 kg)   SpO2 96%   BMI 27.96 kg/m²     The patient is generally well appearing, well nourished, alert and conversational. Affect is normal.    Cutaneous Exam:  Physical Exam  Sun-exposed skin: head/face, neck, both arms, digits and nails were examined. Facial covering was not removed during examination. Diagnoses/exam findings/medical history pertinent to this visit are listed below:    Assessment:   Diagnosis Orders   1. Inflamed seborrheic keratosis  MN DESTRUCTION BENIGN LESIONS UP TO 14   2. Seborrheic keratoses     3. Lentigines          Plan:  1. Inflamed seborrheic keratosis  After discussion of the risks, benefits, and alternative therapies available, the patient elected to proceed. After obtaining written informed consent, the patient's identity, procedure, and site were verified during a time out prior to proceeding procedure. The lesions on the right forearm (x2) were treated using liquid nitrogen spray gun for 6 second per cycle, 2 cycles total. The patient tolerated the procedure well and there were no immediate complications.  - MN DESTRUCTION BENIGN LESIONS UP TO 14    2. Seborrheic keratoses  - reassurance and education    3.  Lentigines  - patient was counseled that UV-damaged skin increases lifetime risk for skin cancer  - I recommended the patient apply broad spectrum spf 30+ sunscreen daily, reapplying every 2 hours. - In additional to regular use of sunscreen, I recommended broad-rimmed hats, long sleeves, and seeking the shade. RTC prn    Future Appointments   Date Time Provider Maycol Amelia   7/20/2022  1:40 PM Stefan Osborn MD Neuro Spec [facility]         There are no Patient Instructions on file for this visit.       Electronically signed by Glendy Ruiz PA-C on 7/5/22 at 4:32 PM EDT

## 2022-08-01 ENCOUNTER — TELEPHONE (OUTPATIENT)
Dept: ORTHOPEDIC SURGERY | Age: 87
End: 2022-08-01

## 2022-08-01 NOTE — TELEPHONE ENCOUNTER
Patients son called and stated that his mom is having a lot of jayson knee pain and some ankle swelling and wanted to be seen in the next couple of days, he does not think she can wait long to be seen due to her pain and did not sleep well last night- please reach out to the son Romeo Goldsmith)  ph. [phone], thank you

## 2022-08-02 DIAGNOSIS — M25.562 PAIN IN BOTH KNEES, UNSPECIFIED CHRONICITY: Primary | ICD-10-CM

## 2022-08-02 DIAGNOSIS — M25.561 PAIN IN BOTH KNEES, UNSPECIFIED CHRONICITY: Primary | ICD-10-CM

## 2022-08-03 ENCOUNTER — TELEPHONE (OUTPATIENT)
Dept: ORTHOPEDIC SURGERY | Age: 87
End: 2022-08-03

## 2022-08-03 ENCOUNTER — OFFICE VISIT (OUTPATIENT)
Dept: ORTHOPEDIC SURGERY | Age: 87
End: 2022-08-03
Payer: MEDICARE

## 2022-08-03 VITALS — WEIGHT: 148 LBS | HEIGHT: 61 IN | BODY MASS INDEX: 27.94 KG/M2

## 2022-08-03 DIAGNOSIS — M25.562 PAIN IN BOTH KNEES, UNSPECIFIED CHRONICITY: ICD-10-CM

## 2022-08-03 DIAGNOSIS — M25.551 PAIN OF RIGHT HIP: Primary | ICD-10-CM

## 2022-08-03 DIAGNOSIS — M25.561 PAIN IN BOTH KNEES, UNSPECIFIED CHRONICITY: ICD-10-CM

## 2022-08-03 DIAGNOSIS — M25.552 HIP PAIN, LEFT: ICD-10-CM

## 2022-08-03 PROCEDURE — G8427 DOCREV CUR MEDS BY ELIG CLIN: HCPCS

## 2022-08-03 PROCEDURE — G8417 CALC BMI ABV UP PARAM F/U: HCPCS

## 2022-08-03 PROCEDURE — 1090F PRES/ABSN URINE INCON ASSESS: CPT

## 2022-08-03 PROCEDURE — 1123F ACP DISCUSS/DSCN MKR DOCD: CPT

## 2022-08-03 PROCEDURE — 1036F TOBACCO NON-USER: CPT

## 2022-08-03 PROCEDURE — 99213 OFFICE O/P EST LOW 20 MIN: CPT

## 2022-08-03 NOTE — TELEPHONE ENCOUNTER
Called patient son for clarification. No answer. Left [facility] to call back. What type of medication is he wanting. Nothing was discussed in appointment.

## 2022-08-03 NOTE — PROGRESS NOTES
[address]  [address] [facility] [address]  Dept: [phone]  Dept Fax: [phone]        Orthopaedic Clinic Follow Up      Subjective:     Lisa Dunn is a 80y.o. year old female who presents to the clinic today for routine follow up regarding her left knee/hip pain. According to the son of the patient and the patient, she had a slip and fall in March 2022, and stated that she was unable to ambulate after the incident. Patient also states that a couple months later, she fell backwards from her toilet and fell onto her left side again. Patient states that her knee was painful than her hip. She has been to our clinic numerous times for injections to the bilateral knees that have been very helpful. Patient states that she does not ambulate much at baseline, but she is unable to bear weight due to the significant mount of pain. Patient states that she is willing to try injections today as she understands that surgical intervention is not an option. She denies numbness or tingling to the bilateral knees at this time. Review of Systems  Gen: no fever, chills, malaise  CV: no chest pain or palpitations  Resp: no cough or shortness of breath  GI: no nausea, vomiting, diarrhea, or constipation  Neuro: no seizures, vertigo, or headache  Msk: As per HPI  10 remaining systems reviewed and negative    Objective : There were no vitals filed for this visit. Body mass index is 27.96 kg/m². General: No acute distress, resting comfortably in the clinic  Neuro: alert. oriented  Eyes: Extra-ocular muscles intact  Pulm: Respirations unlabored and regular. Skin: warm, well perfused  Psych:   Patient has good fund of knowledge and displays understanding of exam, diagnosis, and plan.     MSK: RLE: Patient ambulates with a 2 wheeled walker and a mild limp of the right lower extremity no warmth, erythema, skin lesions, or signs of infection appreciated. Tenderness palpation over the medial lateral joint lines bilaterally. No appreciable joint effusion noted at the knee or hip. Range of motion to the knee from 10-80 degrees. No instability of the knee is appreciated 0 in 30s of flexion and valgus and varus stress bilaterally. No calf tenderness noted. Negative Homans' sign. No pain with logroll. Negative Stinchfield test. Compartments soft and easily compressible. EHL/FHL/TA/GS complex motor intact. Sural/saphenous/SPN/DPN/plantar nerve distribution SILT. Patient has  groin pain with log roll maneuver. Lachman 1a. . DP and PT pulses 2+ with BCR. LLE: Skin intact, no ecchymosis, lacerations, or deformity. Unable to bear weight to left lower extremity. Tenderness palpation over the medial lateral joint lines greater than contralateral side. No appreciable joint effusion noted at the knee or hip. Range of motion to the knee from 10-80 degrees. No instability of the knee is appreciated 0 in 30s of flexion and valgus and varus stress bilaterally. No calf tenderness noted. Negative Homans' sign. No pain with logroll. Negative Stinchfield test. Compartments soft and easily compressible. EHL/FHL/TA/GS complex motor intact. Sural/saphenous/SPN/DPN/plantar nerve distribution SILT. Patient has  groin pain with log roll maneuver. Lachman 1a. . DP and PT pulses 2+ with BCR. Radiology:  History: Right knee pain    Findings: 4 views of the right knee (AP/lateral/tunnel/merchant) showing severe medial joint space narrowing, tricompartmental osteophytosis, joint space sclerosis medially and laterally. No evidence of fracture, subluxation, dislocation, radiopaque foreign body/tumor noted. Lateral subluxation of the tibia is appreciated.     Comparison: 11/23/2020    Impression: Severe tricompartmental osteoarthritis of the right knee        History: Right Left pain    Findings: 4 views of the left knee (AP/lateral/tunnel/merchant) showing severe medial joint space narrowing, tricompartmental osteophytosis, joint space sclerosis medially and laterally. No evidence of fracture, subluxation, dislocation, radiopaque foreign body/tumor noted. Lateral subluxation of the tibia is appreciated. Comparison: 11/23/2020    Impression: Severe tricompartmental osteoarthritis of the left knee      History: Right hip pain    Findings: 2 views of the right hip (AP/cross table lateral) showing significant osteoarthritis of the hip joint, with evidence of severe joint space narrowing, bony sclerosis, cystic changes. No evidence of fracture, subluxation, dislocation, radiopaque foreign body/tumor noted. Lateral subluxation of the tibia is appreciated. Comparison: None    Impression: Severe osteoarthritis of the right hip      History: left hip pain    Findings: 2 views of the left hip (AP/cross table lateral) showing significant osteoarthritis of the hip joint, with evidence of severe joint space narrowing, bony sclerosis, cystic changes. No evidence of fracture, subluxation, dislocation, radiopaque foreign body/tumor noted. Lateral subluxation of the tibia is appreciated. Comparison: None    Impression: Severe tricompartmental osteoarthritis of the left hip     Assessment:   80y.o. year old female with bilateral knee and hip osteoarthritis  Plan: Today, went over the x-rays with the patient, informed patient that she is nearly bone-on-bone of her bilateral hips and bilateral knees, and the only curative option would be for a total knee arthroplasty, which the patient states she is too old for. Also from the patient that we do not see any acute fractures. We went over the progression, etiology, and treatment options for her bilateral osteoarthritis of both knees, but informed the patient that conservative therapy might be the only option given her age.   The patient is amenable to this plan, and stated she only wanted bilateral corticosteroid injections. Bilateral corticosteroid injections were given to the patient without any complications. The patient will follow up in 4 months for repeat injections. Follow up:Return in about 4 months (around 12/3/2022) for repeat injections. No orders of the defined types were placed in this encounter. Orders Placed This Encounter   Procedures    XR HIP RIGHT (2-3 VIEWS)     Standing Status:   Future     Number of Occurrences:   1     Standing Expiration Date:   8/3/2023     Order Specific Question:   Reason for exam:     Answer:   pain    XR HIP LEFT (2-3 VIEWS)     Standing Status:   Future     Number of Occurrences:   1     Standing Expiration Date:   8/3/2023     Order Specific Question:   Reason for exam:     Answer:   other       Electronically signed by Alex Johnson DO Orthopedic Surgery Resident on 8/3/2022 at 2:03 PM    This note is created with the assistance of a speech recognition program.  While intending to generate a document that actually reflects the content of the visit, the document can still have some errors including those of syntax and sound a like substitutions which may escape proof reading.   In such instances, actual meaning can be extrapolated by contextual diversion

## 2022-08-03 NOTE — TELEPHONE ENCOUNTER
At check out today, her son, asked that a pain medication be called in for his mother, Guilherme Zapata. They would like the same medication that was given to her back in February.

## 2022-08-03 NOTE — PROGRESS NOTES
I performed a history and physical examination of the patient and discussed management with the resident. I reviewed the physician assistant/resident physician note and agree with the documented findings and plan of care. Any areas of disagreement are noted on the chart. I have personally evaluated this patient and have completed at least one if not all key elements of the E/M (history, physical exam, and MDM). Additional findings are as noted. I agree with the chief complaint, past medical history, past surgical history, allergies, medications, social and family history as documented unless otherwise noted below.      Electronically signed by Frederick Holman DO on 8/3/2022 at 7:01 PM

## 2022-09-08 RX ORDER — OXYCODONE HYDROCHLORIDE 5 MG/1
TABLET ORAL
Status: CANCELLED | OUTPATIENT
Start: 2022-09-08

## 2022-09-08 NOTE — TELEPHONE ENCOUNTER
Writer spoke with Rexford Kanner regarding refill for oxycodone. Informed him that different provider prescribed that medication. Stated that he would contact patient pcp because she needed strong pain medication and that the oxycodone is that only thing that provided relief. Will contact office if he has any other concerns.

## 2023-02-07 ENCOUNTER — OFFICE VISIT (OUTPATIENT)
Dept: NEUROLOGY | Age: 88
End: 2023-02-07
Payer: MEDICARE

## 2023-02-07 VITALS
BODY MASS INDEX: 28.32 KG/M2 | HEART RATE: 66 BPM | WEIGHT: 150 LBS | HEIGHT: 61 IN | SYSTOLIC BLOOD PRESSURE: 136 MMHG | DIASTOLIC BLOOD PRESSURE: 78 MMHG

## 2023-02-07 DIAGNOSIS — R51.9 ACUTE INTRACTABLE HEADACHE, UNSPECIFIED HEADACHE TYPE: Primary | ICD-10-CM

## 2023-02-07 DIAGNOSIS — D33.3 SCHWANNOMA OF CRANIAL NERVE (HCC): ICD-10-CM

## 2023-02-07 DIAGNOSIS — R42 VERTIGO: ICD-10-CM

## 2023-02-07 DIAGNOSIS — G70.00 MYASTHENIA GRAVIS WITHOUT (ACUTE) EXACERBATION (HCC): ICD-10-CM

## 2023-02-07 DIAGNOSIS — G43.709 CHRONIC MIGRAINE WITHOUT AURA WITHOUT STATUS MIGRAINOSUS, NOT INTRACTABLE: ICD-10-CM

## 2023-02-07 PROCEDURE — 1036F TOBACCO NON-USER: CPT | Performed by: PSYCHIATRY & NEUROLOGY

## 2023-02-07 PROCEDURE — G8427 DOCREV CUR MEDS BY ELIG CLIN: HCPCS | Performed by: PSYCHIATRY & NEUROLOGY

## 2023-02-07 PROCEDURE — 99214 OFFICE O/P EST MOD 30 MIN: CPT | Performed by: PSYCHIATRY & NEUROLOGY

## 2023-02-07 PROCEDURE — 1090F PRES/ABSN URINE INCON ASSESS: CPT | Performed by: PSYCHIATRY & NEUROLOGY

## 2023-02-07 PROCEDURE — G8484 FLU IMMUNIZE NO ADMIN: HCPCS | Performed by: PSYCHIATRY & NEUROLOGY

## 2023-02-07 PROCEDURE — 1123F ACP DISCUSS/DSCN MKR DOCD: CPT | Performed by: PSYCHIATRY & NEUROLOGY

## 2023-02-07 PROCEDURE — G8417 CALC BMI ABV UP PARAM F/U: HCPCS | Performed by: PSYCHIATRY & NEUROLOGY

## 2023-02-07 RX ORDER — DIVALPROEX SODIUM 125 MG/1
TABLET, DELAYED RELEASE ORAL
Qty: 30 TABLET | Refills: 3 | Status: SHIPPED | OUTPATIENT
Start: 2023-02-07

## 2023-02-07 RX ORDER — BUTALBITAL, ACETAMINOPHEN AND CAFFEINE 50; 325; 40 MG/1; MG/1; MG/1
TABLET ORAL
Qty: 20 TABLET | Refills: 0 | Status: SHIPPED | OUTPATIENT
Start: 2023-02-07

## 2023-02-07 RX ORDER — MECLIZINE HYDROCHLORIDE 25 MG/1
TABLET ORAL
Qty: 20 TABLET | Refills: 3 | Status: SHIPPED | OUTPATIENT
Start: 2023-02-07

## 2023-02-07 NOTE — PROGRESS NOTES
NEUROLOGY FOLLOW-UP VISIT   Patient Name:       Christoper Dandy  :        1930  Clinic Visit Date:    2023  LOV: 21      Dear Dr. Ajith Flores MD     I saw Ms. Christoper Dandy in follow-up today in continuation of neurologic care. As you know she  is a 80 y.o. right handed  female with a trigeminal nerve schwannoma and myasthenia gravis and chronic migraine headaches. She was on Depakote in the past with a significant improvement in headaches. She was following up with neurosurgeon, Dr. Jimbo Camarena. Lately her headaches have been occurring much more frequently and these headaches are described as pressure-like and throbbing/pounding headaches located bifrontally with the photophobia and phonophobia. She has occasional nausea and vomiting and she has not tried Fioricet in the past.  She has been taking extra strength Tylenol as needed for headaches. She is initially seen in neurology consultation on 10/26/2021 for new onset headaches with change in pattern of headaches superimposed on chronic migraine headaches. She is found to have left trigeminal nerve schwannoma for which she was evaluated by neurosurgeon, Dr. Jimbo Camarena and she was advised no acute intervention at this point of time; to continue monitoring. Patient comes to clinic stating that Depakote has been \"extremely helpful for headaches\". She is tolerating Depakote well without any adverse effects. But she also stated that she has been having nocturnal paresthesias/dysesthesias in right hand digits with some relief by shaking her hand. This has been occurring on most of the nights. Denied neck pain radiating down right upper extremity. Denied bladder dysfunction. Initial visit on 10/26/2021:  Patient's son accompanied the patient to the clinic stating \"mom had a lot of head injuries in the past\". She has fallen 10 feet during childhood. He also added Lb Annelise has couple of TIAs\" few years ago.   She stated that for the past several weeks; headaches are occurring with increasing frequency and severity. Her son also stated that she does have hx of headaches in the past but \"not this frequent and continuous\". Recently she was seen in ED for continuous and persistent bifrontal and temporal headache of severe intensity with throbbing pain associated with photophobia, phonophobia, nausea and vomiting. She does have intermittent aura. Also has occasional tinnitus but denies hearing loss. She also has upcoming appointment with ophthalmologist for blurred vision and floaters. She also gets dizzy with the headaches and dizziness \"goes away with headache resolution\". She also has neck stiffness with muscle spasms and trouble moving neck sideways. Denied radiating pain and paresthesias. Denied bladder dysfunction. Denied recent febrile illnesses. She has been taking ibuprofen for headache relief. As per ED note; patient has had visual hallucinations described as \"seeing writing on the wall\" and \"a hand pointing\". She was noted to be confused with that headache; ibuprofen has helped with improvement in mentation. Patient's son also reports that patient has diagnosis of myasthenia gravis in the past and it has been in remission for few years. She was on Mestinon in the past.  Her symptoms of myasthenia consisted of double vision and neck weakness \"trouble holding her neck\". She has not had any symptoms to suggest myasthenia gravis for several years. Review of systems done by staff reviewed and pertinent positives include  Headaches, light sensitivity, sound sensitivity, fatigue, visual disturbance, tinnitus, numbness/tingling in right hand digits as stated above.       Current Outpatient Medications on File Prior to Visit   Medication Sig Dispense Refill    Incontinence Supply Disposable (BRIEF OVERNIGHT LARGE) [facility] Use daily 48 each 2    ipratropium (ATROVENT) 0.02 % nebulizer solution Take 2.5 mLs by nebulization every 4 hours as needed for Wheezing 2.5 mL 3    Misc. Devices (ROLLER WALKER) MISC Rolling walker with seat    Dx: Unsteady gait, at high risk for falls 1 each 0    meclizine (ANTIVERT) 25 MG tablet Take one tab every 8 hr as needed for vertigo with nausea 20 tablet 1    ibuprofen (ADVIL;MOTRIN) 400 MG tablet Take 1.5 tablets by mouth every 8 hours as needed for Pain (Patient not taking: Reported on 2/7/2023) 90 tablet 3     No current facility-administered medications on file prior to visit. Allergies: Raina Deleon is allergic to seasonal and shellfish-derived products. Past Medical History:   Diagnosis Date    Allergic rhinitis     Angina pectoris (HCC)     CAD (coronary artery disease)     Congenital heart disease     COPD (chronic obstructive pulmonary disease) ([facility] [address]     Headache(784.0)     Hypothyroidism     Obesity     Peptic ulcer     Pure hypercholesterolemia 6/7/2017    Urinary incontinence        Past Surgical History:   Procedure Laterality Date    APPENDECTOMY      KNEE ARTHROSCOPY       Social History: Nisreen Dasilva  reports that she has never smoked. She has never used smokeless tobacco. She reports that she does not drink alcohol and does not use drugs. Family History   Problem Relation Age of Onset    Stroke Mother     Other Father     Cancer Sister     Cancer Brother      On exam; Blood pressure 136/78, pulse 66, height 5' 1\" (1.549 m), weight 150 lb (68 kg). NEUROLOGIC EXAMINATION  GENERAL  Appears comfortable and in no distress   HEENT  NC/ AT   NECK  Supple and no bruits heard   MENTAL STATUS:  Alert, oriented, intact memory, no confusion, normal speech, normal language, no hallucination or delusion; appropriate affect    CRANIAL NERVES: II     -      PERRLA, Fundi reveal intact venous pulsations;  Visual fields intact to confrontation  III,IV,VI -  EOMs full, no afferent defect, no                      YORDY, no ptosis  V     -     Normal facial sensation  VII    -     Normal facial symmetry  VIII   -     Intact hearing  IX,X -     Symmetrical palate  XI    -     Symmetrical shoulder shrug  XII   -     Midline tongue, no atrophy    MOTOR FUNCTION:  significant for good strength of grade 5/5 in bilateral proximal and distal muscle groups of both upper and lower extremities with normal bulk, normal tone and no involuntary movements, no tremor   SENSORY FUNCTION:  Normal touch, normal pin, normal vibration, normal proprioception   CEREBELLAR FUNCTION:  Intact fine motor control over upper limbs   REFLEX FUNCTION:  Symmetric, no perverted reflex, no Babinski sign   STATION and GAIT  Normal station, normal gait        2/7/2023  Maximum score 5 Score 4 What is the year, season, date, day, month \"Monday, march 2023,    Maximum score 5 Score 5 Where are we: State, county, town, hospital, floor? Maximum score 3 Score 3 Name 3 objects: clock, guitar, globe. .  Ask patient to repeat 3 objects. Maximum score 5 Score 5 Serial sevens from 100:93, 86, 79, 72, 65   Maximum score 3 Score 2 Recall 3 objects   Maximum score 2 Score 2 Name a pencil and watch. Maximum score 1 Score 1 Repeat the following: No ifs ands or buts.      Maximum score 3 Score 3 Follow 3 stage command take a paper in your hand, fold it in half, and put it on the floor. Maximum score 1  Score 1 Read and obey the following: Close your eyes     Maximum score 1 Score 1 Write a sentence. Maximum score 1 Score 0 Copy a design below.        Max 30   Patients score 28        Diagnostic data reviewed with the patient:     Lab Results   Component Value Date    WBC 10.3 09/23/2020    HGB 14.3 09/23/2020    HCT 43.4 09/23/2020    MCV 97.3 09/23/2020     09/23/2020    LYMPHOPCT 15 (L) 09/23/2020    RBC 4.46 09/23/2020    MCH 32.1 09/23/2020    MCHC 32.9 09/23/2020    RDW 12.3 09/23/2020       Lab Results   Component Value Date     09/23/2020    K 3.8 09/23/2020     09/23/2020    CO2 23 09/23/2020    BUN 9 09/23/2020    CREATININE 0.89 02/15/2022    GLUCOSE 106 (H) 09/23/2020    CALCIUM 9.6 09/23/2020    PROT 7.4 08/23/2018    LABALBU 4.0 08/23/2018    BILITOT 0.59 08/23/2018    ALKPHOS 104 08/23/2018    AST 26 08/23/2018    ALT 46 (H) 08/23/2018    LABGLOM NOT REPORTED 02/15/2022    GFRAA >60 09/23/2020       No results found for: PHENYTOIN, PHENYF, PHENOBARB, VALPROATE, CBMZ    Lab Results   Component Value Date    CHOL 185 08/23/2018    LDLCHOLESTEROL 107 08/23/2018    HDL 59 08/23/2018    TRIG 93 08/23/2018    ALT 46 (H) 08/23/2018    AST 26 08/23/2018    TSH 3.06 08/23/2018    INR 1.0 06/08/2017    [id number] 08/23/2018     CT head (10/14/2021) done at [facility]: No acute intracranial findings. MRI brain ( wo) 11/17/2021: No acute intracranial abnormality. 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 It may be artifactual or represent a mass lesion. Evaluation with postcontrast enhanced MRI brain with thin section imaging through the skull base is recommended. MRA head (11/17/2021): No major arterial stenosis or occlusion seen. Carotid Dopplers (11/17/21): Mild <50% stenosis of bilateral internal carotid arteries. Patent vertebral arteries bilaterally with antegrade flow. MRI brain (w/wo) 12/17/2021: 11 x 4 mm enhancing mass along the left trigeminal nerve, minimally extending into prepontine cistern, likely representing schwannoma. No acute abnormality seen in the brain. Impression and Plan: Ms. Eleonora Ruiz is a 80 y.o. female with   Progressive headaches with migrainous as well as tension type headaches; with trigeminal nerve schwannoma; will get MRI brain with/without contrast and proceed accordingly. Chronic migraine headaches without aura; intractable; migraines were improving and patient stopped it on her own; she was advised to continue Depakote   Right carpal tunnel symptomatology: To use nightly wrist splint  Left trigeminal nerve schwannoma:  To continue follow-ups with neurosurgeon, Dr. Zoe Souza. Chronic myasthenia gravis: We will get myasthenia gravis panel to follow-up on it  History of head injury  History of recurrent TIAs with bilateral carotid artery stenosis and hyperlipidemia  Follow-up visit in 2-3 months or sooner. This note was partially created using voice recognition software and is inherently subject to errors including those of syntax and \"sound alike\" substitutions which may escape proofreading. In such instances, original meaning may be extrapolated by contextual derivation.   Ramona Núñez MD 2/7/2023 3:22 PM

## 2023-03-03 ENCOUNTER — HOSPITAL ENCOUNTER (OUTPATIENT)
Dept: MRI IMAGING | Age: 88
End: 2023-03-03
Payer: MEDICARE

## 2023-03-03 DIAGNOSIS — D33.3 SCHWANNOMA OF CRANIAL NERVE (HCC): ICD-10-CM

## 2023-03-03 DIAGNOSIS — R42 VERTIGO: ICD-10-CM

## 2023-03-03 LAB
CREAT SERPL-MCNC: 0.76 MG/DL (ref 0.5–0.9)
GFR SERPL CREATININE-BSD FRML MDRD: >60 ML/MIN/1.73M2

## 2023-03-03 PROCEDURE — 36415 COLL VENOUS BLD VENIPUNCTURE: CPT

## 2023-03-03 PROCEDURE — 6360000004 HC RX CONTRAST MEDICATION: Performed by: PSYCHIATRY & NEUROLOGY

## 2023-03-03 PROCEDURE — A9579 GAD-BASE MR CONTRAST NOS,1ML: HCPCS | Performed by: PSYCHIATRY & NEUROLOGY

## 2023-03-03 PROCEDURE — 82565 ASSAY OF CREATININE: CPT

## 2023-03-03 PROCEDURE — G1010 CDSM STANSON: HCPCS

## 2023-03-03 RX ADMIN — GADOTERIDOL 13 ML: 279.3 INJECTION, SOLUTION INTRAVENOUS at 14:36

## 2023-03-31 DIAGNOSIS — G43.709 CHRONIC MIGRAINE WITHOUT AURA WITHOUT STATUS MIGRAINOSUS, NOT INTRACTABLE: ICD-10-CM

## 2023-04-03 NOTE — TELEPHONE ENCOUNTER
Pharmacy requesting refill of Fioricet.       Medication active on med list yes      Date of last fill: 2/7/23  verified on 4/3/2023   verified by [facility] LPN      Date of last appointment 2/7/23    Next Visit Date:  5/9/2023

## 2023-04-04 RX ORDER — BUTALBITAL, ACETAMINOPHEN AND CAFFEINE 50; 325; 40 MG/1; MG/1; MG/1
TABLET ORAL
Qty: 20 TABLET | Refills: 0 | Status: SHIPPED | OUTPATIENT
Start: 2023-04-04 | End: 2023-05-03

## 2023-04-17 ENCOUNTER — CARE COORDINATION (OUTPATIENT)
Dept: CARE COORDINATION | Age: 88
End: 2023-04-17

## 2023-04-18 NOTE — CARE COORDINATION
Email sent to SNF staff to alert of [facility] pt admission, requesting clinical notes and Collaboration during SNF skilled stay.     Rodrigo Shaw RN Post Acute Care Manager [phone] Hydroxychloroquine Counseling:  I discussed with the patient that a baseline ophthalmologic exam is needed at the start of therapy and every year thereafter while on therapy. A CBC may also be warranted for monitoring.  The side effects of this medication were discussed with the patient, including but not limited to agranulocytosis, aplastic anemia, seizures, rashes, retinopathy, and liver toxicity. Patient instructed to call the office should any adverse effect occur.  The patient verbalized understanding of the proper use and possible adverse effects of Plaquenil.  All the patient's questions and concerns were addressed.

## 2023-05-01 ENCOUNTER — CARE COORDINATION (OUTPATIENT)
Dept: CARE COORDINATION | Age: 88
End: 2023-05-01

## 2023-05-01 NOTE — CARE COORDINATION
[facility] to obtain discharge disposition. Was transferred to [address] [facility], and no answer or option for voicemail. Will try back later.

## 2023-05-11 ENCOUNTER — OFFICE VISIT (OUTPATIENT)
Dept: FAMILY MEDICINE CLINIC | Age: 88
End: 2023-05-11

## 2023-05-11 VITALS
TEMPERATURE: 98.6 F | SYSTOLIC BLOOD PRESSURE: 108 MMHG | WEIGHT: 169.8 LBS | HEIGHT: 59 IN | HEART RATE: 41 BPM | OXYGEN SATURATION: 99 % | BODY MASS INDEX: 34.23 KG/M2 | DIASTOLIC BLOOD PRESSURE: 64 MMHG

## 2023-05-11 DIAGNOSIS — G47.9 SLEEPING DIFFICULTY: Primary | ICD-10-CM

## 2023-05-11 DIAGNOSIS — R42 VERTIGO: ICD-10-CM

## 2023-05-11 DIAGNOSIS — F05 SUNDOWNING: ICD-10-CM

## 2023-05-11 DIAGNOSIS — G43.709 CHRONIC MIGRAINE WITHOUT AURA WITHOUT STATUS MIGRAINOSUS, NOT INTRACTABLE: ICD-10-CM

## 2023-05-11 DIAGNOSIS — M32.9 PERSONAL HISTORY OF SYSTEMIC LUPUS ERYTHEMATOSUS (SLE) (HCC): ICD-10-CM

## 2023-05-11 DIAGNOSIS — N39.45 CONTINUOUS LEAKAGE OF URINE: ICD-10-CM

## 2023-05-11 PROBLEM — J44.9 CHRONIC OBSTRUCTIVE PULMONARY DISEASE, UNSPECIFIED (HCC): Status: ACTIVE | Noted: 2023-05-11

## 2023-05-11 RX ORDER — MECLIZINE HYDROCHLORIDE 25 MG/1
TABLET ORAL
Qty: 20 TABLET | Refills: 3 | Status: SHIPPED | OUTPATIENT
Start: 2023-05-11

## 2023-05-11 RX ORDER — HYDROXYZINE HYDROCHLORIDE 10 MG/1
10 TABLET, FILM COATED ORAL EVERY EVENING
Qty: 30 TABLET | Refills: 1 | Status: SHIPPED | OUTPATIENT
Start: 2023-05-11

## 2023-05-11 RX ORDER — ZOSTER VACCINE RECOMBINANT, ADJUVANTED 50 MCG/0.5
0.5 KIT INTRAMUSCULAR SEE ADMIN INSTRUCTIONS
Qty: 0.5 ML | Refills: 0 | Status: CANCELLED | OUTPATIENT
Start: 2023-05-11 | End: 2023-05-12

## 2023-05-11 RX ORDER — PEN NEEDLE, DIABETIC 32GX 5/32"
1 NEEDLE, DISPOSABLE MISCELLANEOUS EVERY 4 HOURS
Qty: 200 EACH | Refills: 5 | Status: SHIPPED | OUTPATIENT
Start: 2023-05-11

## 2023-05-11 SDOH — ECONOMIC STABILITY: INCOME INSECURITY: HOW HARD IS IT FOR YOU TO PAY FOR THE VERY BASICS LIKE FOOD, HOUSING, MEDICAL CARE, AND HEATING?: NOT HARD AT ALL

## 2023-05-11 SDOH — ECONOMIC STABILITY: FOOD INSECURITY: WITHIN THE PAST 12 MONTHS, YOU WORRIED THAT YOUR FOOD WOULD RUN OUT BEFORE YOU GOT MONEY TO BUY MORE.: NEVER TRUE

## 2023-05-11 SDOH — ECONOMIC STABILITY: FOOD INSECURITY: WITHIN THE PAST 12 MONTHS, THE FOOD YOU BOUGHT JUST DIDN'T LAST AND YOU DIDN'T HAVE MONEY TO GET MORE.: NEVER TRUE

## 2023-05-11 SDOH — ECONOMIC STABILITY: HOUSING INSECURITY
IN THE LAST 12 MONTHS, WAS THERE A TIME WHEN YOU DID NOT HAVE A STEADY PLACE TO SLEEP OR SLEPT IN A SHELTER (INCLUDING NOW)?: NO

## 2023-05-11 ASSESSMENT — PATIENT HEALTH QUESTIONNAIRE - PHQ9
SUM OF ALL RESPONSES TO PHQ QUESTIONS 1-9: 1
SUM OF ALL RESPONSES TO PHQ9 QUESTIONS 1 & 2: 1
1. LITTLE INTEREST OR PLEASURE IN DOING THINGS: 0
SUM OF ALL RESPONSES TO PHQ QUESTIONS 1-9: 1
2. FEELING DOWN, DEPRESSED OR HOPELESS: 1

## 2023-05-16 ENCOUNTER — TELEPHONE (OUTPATIENT)
Dept: FAMILY MEDICINE CLINIC | Age: 88
End: 2023-05-16

## 2023-05-16 NOTE — TELEPHONE ENCOUNTER
Salo Mclaughlin from [facility] called to notify Dr Portia Larson that the   Incontinence Supply Disposable (COMFORT SHIELD ADULT DIAPERS)  are not covered. I tried to reach patient but was unavailable, message was left.

## 2023-05-30 ENCOUNTER — OFFICE VISIT (OUTPATIENT)
Dept: NEUROLOGY | Age: 88
End: 2023-05-30
Payer: COMMERCIAL

## 2023-05-30 VITALS
HEART RATE: 51 BPM | DIASTOLIC BLOOD PRESSURE: 63 MMHG | HEIGHT: 59 IN | WEIGHT: 138 LBS | SYSTOLIC BLOOD PRESSURE: 134 MMHG | BODY MASS INDEX: 27.82 KG/M2

## 2023-05-30 DIAGNOSIS — G43.709 CHRONIC MIGRAINE WITHOUT AURA WITHOUT STATUS MIGRAINOSUS, NOT INTRACTABLE: ICD-10-CM

## 2023-05-30 DIAGNOSIS — I69.398 ALTERED SENSATION DUE TO RECENT STROKE: ICD-10-CM

## 2023-05-30 DIAGNOSIS — E78.5 HYPERLIPIDEMIA, UNSPECIFIED HYPERLIPIDEMIA TYPE: ICD-10-CM

## 2023-05-30 DIAGNOSIS — R20.9 ALTERED SENSATION DUE TO RECENT STROKE: ICD-10-CM

## 2023-05-30 DIAGNOSIS — I63.431 CEREBROVASCULAR ACCIDENT (CVA) DUE TO EMBOLISM OF RIGHT POSTERIOR CEREBRAL ARTERY (HCC): Primary | ICD-10-CM

## 2023-05-30 DIAGNOSIS — R26.9 GAIT DIFFICULTY: ICD-10-CM

## 2023-05-30 PROCEDURE — 1123F ACP DISCUSS/DSCN MKR DOCD: CPT | Performed by: PSYCHIATRY & NEUROLOGY

## 2023-05-30 PROCEDURE — 99215 OFFICE O/P EST HI 40 MIN: CPT | Performed by: PSYCHIATRY & NEUROLOGY

## 2023-05-30 PROCEDURE — 1036F TOBACCO NON-USER: CPT | Performed by: PSYCHIATRY & NEUROLOGY

## 2023-05-30 PROCEDURE — G8417 CALC BMI ABV UP PARAM F/U: HCPCS | Performed by: PSYCHIATRY & NEUROLOGY

## 2023-05-30 PROCEDURE — G8427 DOCREV CUR MEDS BY ELIG CLIN: HCPCS | Performed by: PSYCHIATRY & NEUROLOGY

## 2023-05-30 PROCEDURE — 1090F PRES/ABSN URINE INCON ASSESS: CPT | Performed by: PSYCHIATRY & NEUROLOGY

## 2023-05-30 RX ORDER — BUTALBITAL, ACETAMINOPHEN AND CAFFEINE 50; 325; 40 MG/1; MG/1; MG/1
TABLET ORAL
Qty: 40 TABLET | Refills: 0 | Status: SHIPPED | OUTPATIENT
Start: 2023-05-30

## 2023-05-30 RX ORDER — ATORVASTATIN CALCIUM 40 MG/1
40 TABLET, FILM COATED ORAL DAILY
Qty: 90 TABLET | Refills: 1 | Status: SHIPPED | OUTPATIENT
Start: 2023-05-30

## 2023-05-30 RX ORDER — ASPIRIN 81 MG/1
81 TABLET ORAL DAILY
Qty: 30 TABLET | Refills: 5 | Status: SHIPPED | OUTPATIENT
Start: 2023-05-30

## 2023-05-30 NOTE — PROGRESS NOTES
03/03/2023    GLUCOSE 106 (H) 09/23/2020    CALCIUM 9.6 09/23/2020    PROT 7.4 08/23/2018    LABALBU 4.0 08/23/2018    BILITOT 0.59 08/23/2018    ALKPHOS 104 08/23/2018    AST 26 08/23/2018    ALT 46 (H) 08/23/2018    LABGLOM >60 03/03/2023    GFRAA >60 09/23/2020       No results found for: PHENYTOIN, PHENYF, PHENOBARB, VALPROATE, CBMZ    Lab Results   Component Value Date    CHOL 185 08/23/2018    LDLCHOLESTEROL 107 08/23/2018    HDL 59 08/23/2018    TRIG 93 08/23/2018    ALT 46 (H) 08/23/2018    AST 26 08/23/2018    TSH 3.06 08/23/2018    INR 1.0 06/08/2017    [id number] 998 08/23/2018     CT head (10/14/2021) done at [facility]: No acute intracranial findings. MRI brain ( wo) 11/17/2021: No acute intracranial abnormality. 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 It may be artifactual or represent a mass lesion. Evaluation with postcontrast enhanced MRI brain with thin section imaging through the skull base is recommended. MRA head (11/17/2021): No major arterial stenosis or occlusion seen. Carotid Dopplers (11/17/21): Mild <50% stenosis of bilateral internal carotid arteries. Patent vertebral arteries bilaterally with antegrade flow. MRI brain (w/wo) 12/17/2021: 11 x 4 mm enhancing mass along the left trigeminal nerve, minimally extending into prepontine cistern, likely representing schwannoma. No acute abnormality seen in the brain. Recent [facility] [place] Neurological Work-up:   EEG 4/12/2023: Abnormal indicative of mild diffuse encephalopathy. No epileptiform discharges or clear lateralizing signs seen. MRI brain 4/12/2023: Acute ischemia right PCA territory. Additional features suggestive of amyloid angiopathy. Moderate to severe, nearly confluent, white matter signal changes noted. Nonspecific asymmetric enhancement of left trigeminal nerve noted. MRA head 4/14/2023:  Minimal narrowing in distal right P2 segment, with distal reconstitution and no

## 2023-06-19 ENCOUNTER — TELEPHONE (OUTPATIENT)
Dept: PRIMARY CARE CLINIC | Age: 88
End: 2023-06-19

## 2023-06-19 DIAGNOSIS — F05 SUNDOWNING: ICD-10-CM

## 2023-06-19 DIAGNOSIS — G47.9 SLEEPING DIFFICULTY: ICD-10-CM

## 2023-06-19 NOTE — TELEPHONE ENCOUNTER
Spoke with granddaughter Quinten Kanner. She states the hydroxyzine is helping. State she is not giving her one every night and wants to know if this is ok. States she tried to get her evaluated with PT but states it is hard to get her out of the house. States it was discussed at last visit about doing home PT through home care services. Ok to leave detailed message.

## 2023-06-19 NOTE — TELEPHONE ENCOUNTER
Pt called into the office stating she was returning a call to the [facility] regarding PT. I did not readily see anything in chart about this specifically so I told her I would route a message to the [facility] pool to call her back.

## 2023-07-11 ENCOUNTER — TELEMEDICINE (OUTPATIENT)
Dept: FAMILY MEDICINE CLINIC | Age: 88
End: 2023-07-11
Payer: MEDICARE

## 2023-07-11 DIAGNOSIS — R53.1 GENERALIZED WEAKNESS: ICD-10-CM

## 2023-07-11 DIAGNOSIS — F05 SUNDOWNING: ICD-10-CM

## 2023-07-11 DIAGNOSIS — Z23 NEED FOR SHINGLES VACCINE: ICD-10-CM

## 2023-07-11 DIAGNOSIS — G47.9 SLEEPING DIFFICULTY: Primary | ICD-10-CM

## 2023-07-11 DIAGNOSIS — G43.709 CHRONIC MIGRAINE WITHOUT AURA WITHOUT STATUS MIGRAINOSUS, NOT INTRACTABLE: ICD-10-CM

## 2023-07-11 DIAGNOSIS — J44.9 CHRONIC OBSTRUCTIVE PULMONARY DISEASE, UNSPECIFIED COPD TYPE (HCC): ICD-10-CM

## 2023-07-11 DIAGNOSIS — Z91.81 AT HIGH RISK FOR INJURY RELATED TO FALL: ICD-10-CM

## 2023-07-11 DIAGNOSIS — R14.2 BELCHING: ICD-10-CM

## 2023-07-11 PROCEDURE — G8427 DOCREV CUR MEDS BY ELIG CLIN: HCPCS | Performed by: INTERNAL MEDICINE

## 2023-07-11 PROCEDURE — 99214 OFFICE O/P EST MOD 30 MIN: CPT | Performed by: INTERNAL MEDICINE

## 2023-07-11 PROCEDURE — G8417 CALC BMI ABV UP PARAM F/U: HCPCS | Performed by: INTERNAL MEDICINE

## 2023-07-11 PROCEDURE — 1036F TOBACCO NON-USER: CPT | Performed by: INTERNAL MEDICINE

## 2023-07-11 PROCEDURE — 1123F ACP DISCUSS/DSCN MKR DOCD: CPT | Performed by: INTERNAL MEDICINE

## 2023-07-11 PROCEDURE — 3023F SPIROM DOC REV: CPT | Performed by: INTERNAL MEDICINE

## 2023-07-11 PROCEDURE — 1090F PRES/ABSN URINE INCON ASSESS: CPT | Performed by: INTERNAL MEDICINE

## 2023-07-11 RX ORDER — ZOSTER VACCINE RECOMBINANT, ADJUVANTED 50 MCG/0.5
0.5 KIT INTRAMUSCULAR SEE ADMIN INSTRUCTIONS
Qty: 0.5 ML | Refills: 0 | Status: SHIPPED | OUTPATIENT
Start: 2023-07-11 | End: 2023-07-12

## 2023-07-11 RX ORDER — HYDROXYZINE HYDROCHLORIDE 10 MG/1
10 TABLET, FILM COATED ORAL EVERY EVENING
Qty: 30 TABLET | Refills: 1 | Status: SHIPPED | OUTPATIENT
Start: 2023-07-11

## 2023-07-11 RX ORDER — BUTALBITAL, ACETAMINOPHEN AND CAFFEINE 50; 325; 40 MG/1; MG/1; MG/1
TABLET ORAL
Qty: 40 TABLET | Refills: 0 | Status: SHIPPED | OUTPATIENT
Start: 2023-07-11

## 2023-07-11 ASSESSMENT — ENCOUNTER SYMPTOMS
CONSTIPATION: 0
ABDOMINAL PAIN: 0
NAUSEA: 0
COUGH: 0
WHEEZING: 0
VOMITING: 0
SHORTNESS OF BREATH: 0
BLOOD IN STOOL: 0
ANAL BLEEDING: 0
CHOKING: 0
CHEST TIGHTNESS: 0
DIARRHEA: 0

## 2023-07-11 NOTE — PROGRESS NOTES
Tyler Espinoza (:  1930) is a Established patient, presenting virtually for evaluation of the following:    Assessment & Plan   Below is the assessment and plan developed based on review of pertinent history, physical exam, labs, studies, and medications. 1. Sleeping difficulty  -     hydrOXYzine HCl (ATARAX) 10 MG tablet; Take 1 tablet by mouth every evening, Disp-30 tablet, R-1Normal  2. Sundowning  -     hydrOXYzine HCl (ATARAX) 10 MG tablet; Take 1 tablet by mouth every evening, Disp-30 tablet, R-1Normal  3. Need for shingles vaccine  -     zoster recombinant adjuvanted vaccine [facility]) 50 MCG/0.5ML SUSR injection; Inject 0.5 mLs into the muscle See Admin Instructions for 1 day, Disp-0.5 mL, R-0Print  4. Chronic migraine without aura without status migrainosus, not intractable  -     butalbital-acetaminophen-caffeine (FIORICET, ESGIC) -40 MG per tablet; Take one tab every other day as needed for severe headache, Disp-40 tablet, R-0Normal  5. Belching  6. Chronic obstructive pulmonary disease, unspecified COPD type ([address])  7. Generalized weakness  -     External Referral To Home Health  8. At high risk for injury related to fall  -     External Referral To Home Health    No follow-ups on file. Subjective   Moved in with her granddaughter Abhishek Hoyoschjhonny   She feels she is doing better overall   She is off depakote   Constantly belching   Occasionally wakes up with headache, goes away once she eats breakfast and has a coffee. Unsteady gait, requiring assistance getting up from sitting, walking down hallway, toileting, bathing. Would like to try home PT followed by orthopedics for injections. Review of Systems   Constitutional:  Negative for fatigue, fever and unexpected weight change. Respiratory:  Negative for cough, choking, chest tightness, shortness of breath and wheezing. Cardiovascular:  Negative for chest pain, palpitations and leg swelling.    Gastrointestinal:  Negative for

## 2023-07-11 NOTE — PROGRESS NOTES
Pt is being seen today for a regular f/u     Pt stated she has been having more headaches, but granddaughter Gary Cam was in the background saying \"no you haven't\" then Gary Cam said you used to stay in bed for days when you get a headache and you haven't been doing that       Pt did new glasses, prescription stayed the same

## 2023-07-12 ENCOUNTER — TELEPHONE (OUTPATIENT)
Dept: FAMILY MEDICINE CLINIC | Age: 88
End: 2023-07-12

## 2023-07-12 NOTE — TELEPHONE ENCOUNTER
----- Message from [facility] sent at 7/11/2023  3:21 PM EDT -----  Subject: Message to Provider    QUESTIONS  Information for Provider? [address] [place] [address] unable to provide any   care due to not going to [facility] sorry for this   ---------------------------------------------------------------------------  --------------  [place] Trino  [phone]; Do not leave any message, patient will call back for answer  ---------------------------------------------------------------------------  --------------  SCRIPT ANSWERS  Relationship to Patient? Covered Entity  Covered Entity Type? Home Health Care?   Representative Name? Clancy Homans

## 2023-07-17 ENCOUNTER — TELEPHONE (OUTPATIENT)
Dept: FAMILY MEDICINE CLINIC | Age: 88
End: 2023-07-17

## 2023-07-17 NOTE — TELEPHONE ENCOUNTER
Cally from [facility] called for a verbal to do home PT.  How many times a week are listed below:    1 day for a week  2 days for 3 weeks  1 day for a week    There was a referral for [address] [place] [address] but had to be changed to [facility] unable to provide any care due to not going to [facility])

## 2023-07-20 ENCOUNTER — TELEPHONE (OUTPATIENT)
Dept: FAMILY MEDICINE CLINIC | Age: 88
End: 2023-07-20

## 2023-07-20 NOTE — TELEPHONE ENCOUNTER
Received call from Belinda Shook (granddaughter), states PT and OT both recommended that patient have hospital bed. Advised hamilton, that patient will need an appt in order for insurance to cover.  Visit scheduled

## 2023-07-20 NOTE — TELEPHONE ENCOUNTER
Received call from Sandip (OT) with [facility]. He called to get verbal to see patient twice a week for four weeks.     Approval given

## 2023-08-07 ENCOUNTER — TELEPHONE (OUTPATIENT)
Dept: FAMILY MEDICINE CLINIC | Age: 88
End: 2023-08-07

## 2023-08-07 NOTE — TELEPHONE ENCOUNTER
Sravani Canseco called to get verbal to do physical therapy 2 days a week for 4 weeks. It will be for balance, gait and strength  She is going to send form for signature.  Verbal was given    LAST APPT WAS 7/11/23

## 2023-08-11 ENCOUNTER — TELEPHONE (OUTPATIENT)
Dept: FAMILY MEDICINE CLINIC | Age: 88
End: 2023-08-11

## 2023-08-11 NOTE — TELEPHONE ENCOUNTER
Patient's granddaughter called stating patient has not slept in 24 hours. States she is having hallucinations also. Asked if this was something new that happened and she stated no. She then stated the hallucinations are new, but she used to have them in the past.    States she has given her 3 hydroxyzine in the last 24 hours. Explained to her it may be best to get her checked for a UTI and granddaughter states she does not have a UTI. States she is drinking water, urine is clear.     She is asking if she can give her something OTC like tylenol PM.

## 2023-08-13 NOTE — TELEPHONE ENCOUNTER
They can give her tylenol PM, but she likely needs to be seen and get labs done.  They can either take her to ER or urgent care if hallucinations persistent

## 2023-08-15 ENCOUNTER — TELEPHONE (OUTPATIENT)
Dept: FAMILY MEDICINE CLINIC | Age: 88
End: 2023-08-15

## 2023-08-15 DIAGNOSIS — F44.89 CONFUSION STATE: Primary | ICD-10-CM

## 2023-08-15 NOTE — TELEPHONE ENCOUNTER
Lindsey Schroeder called to see if labs and urine culture can be ordered. Anabel Mustafacarlyleethel will be at the home on 08/17/23. Henri Cao is eating, drinking but she is having some confusion. She does have dementia and a recent TIA. Lindsey Schroeder is worried about UTI.     Anabel Mustafaavery fax #  [phone] or [phone]

## 2023-08-16 NOTE — TELEPHONE ENCOUNTER
Orders signed - there was a a call on 8/11 about hallucinations -if this is persistent, she would benefit from an ER evaluation given her recent TIA

## 2023-08-17 LAB
ALBUMIN SERPL-MCNC: 3.9 G/DL
ALP BLD-CCNC: 331 U/L
ALT SERPL-CCNC: 77 U/L
ANION GAP SERPL CALCULATED.3IONS-SCNC: 13 MMOL/L
AST SERPL-CCNC: 91 U/L
BASOPHILS ABSOLUTE: 0 /ΜL
BASOPHILS RELATIVE PERCENT: 0.5 %
BILIRUB SERPL-MCNC: 0.8 MG/DL (ref 0.1–1.4)
BUN BLDV-MCNC: 13 MG/DL
CALCIUM SERPL-MCNC: 9.8 MG/DL
CHLORIDE BLD-SCNC: 97 MMOL/L
CO2: 29 MMOL/L
CREAT SERPL-MCNC: 0.68 MG/DL
EGFR: 81
EOSINOPHILS ABSOLUTE: 0 /ΜL
EOSINOPHILS RELATIVE PERCENT: 0.4 %
GLUCOSE BLD-MCNC: 85 MG/DL
HCT VFR BLD CALC: 40 % (ref 36–46)
HEMOGLOBIN: 13.5 G/DL (ref 12–16)
LYMPHOCYTES ABSOLUTE: 1.3 /ΜL
LYMPHOCYTES RELATIVE PERCENT: 22.8 %
MCH RBC QN AUTO: 32.6 PG
MCHC RBC AUTO-ENTMCNC: 33.8 G/DL
MCV RBC AUTO: 97 FL
MONOCYTES ABSOLUTE: 0.4 /ΜL
MONOCYTES RELATIVE PERCENT: 7.1 %
NEUTROPHILS ABSOLUTE: 3.9 /ΜL
NEUTROPHILS RELATIVE PERCENT: 69.2 %
PDW BLD-RTO: 14.3 %
PLATELET # BLD: 210 K/ΜL
PMV BLD AUTO: 9.4 FL
POTASSIUM SERPL-SCNC: 3.4 MMOL/L
RBC # BLD: 4.14 10^6/ΜL
SODIUM BLD-SCNC: 139 MMOL/L
TOTAL PROTEIN: 7.3
WBC # BLD: 5.7 10^3/ML

## 2023-08-21 DIAGNOSIS — F44.89 CONFUSION STATE: ICD-10-CM

## 2023-08-31 DIAGNOSIS — R74.8 ELEVATED LIVER ENZYMES: Primary | ICD-10-CM

## 2023-10-26 ENCOUNTER — HOSPITAL ENCOUNTER (INPATIENT)
Age: 88
LOS: 3 days | Discharge: NURSING, REHAB OR LONG-TERM CARE | DRG: 102 | End: 2023-10-31
Attending: EMERGENCY MEDICINE | Admitting: STUDENT IN AN ORGANIZED HEALTH CARE EDUCATION/TRAINING PROGRAM
Payer: MEDICARE

## 2023-10-26 ENCOUNTER — APPOINTMENT (OUTPATIENT)
Dept: GENERAL RADIOLOGY | Age: 88
DRG: 102 | End: 2023-10-26
Payer: MEDICARE

## 2023-10-26 ENCOUNTER — TELEPHONE (OUTPATIENT)
Dept: FAMILY MEDICINE CLINIC | Age: 88
End: 2023-10-26

## 2023-10-26 ENCOUNTER — APPOINTMENT (OUTPATIENT)
Dept: CT IMAGING | Age: 88
DRG: 102 | End: 2023-10-26
Payer: MEDICARE

## 2023-10-26 DIAGNOSIS — F01.50 VASCULAR DEMENTIA WITHOUT BEHAVIORAL DISTURBANCE, PSYCHOTIC DISTURBANCE, MOOD DISTURBANCE, OR ANXIETY, UNSPECIFIED DEMENTIA SEVERITY (HCC): Primary | ICD-10-CM

## 2023-10-26 DIAGNOSIS — R51.9 NONINTRACTABLE HEADACHE, UNSPECIFIED CHRONICITY PATTERN, UNSPECIFIED HEADACHE TYPE: ICD-10-CM

## 2023-10-26 DIAGNOSIS — I63.9 ISCHEMIC CEREBROVASCULAR ACCIDENT (CVA) (HCC): ICD-10-CM

## 2023-10-26 PROBLEM — Z78.9 UNABLE TO CARE FOR SELF: Status: ACTIVE | Noted: 2023-10-26

## 2023-10-26 LAB
ALBUMIN SERPL-MCNC: 3.8 G/DL (ref 3.5–5.2)
ALP SERPL-CCNC: 150 U/L (ref 35–104)
ALT SERPL-CCNC: 14 U/L (ref 5–33)
ANION GAP SERPL CALCULATED.3IONS-SCNC: 10 MMOL/L (ref 9–17)
AST SERPL-CCNC: 19 U/L
BACTERIA URNS QL MICRO: ABNORMAL
BASOPHILS # BLD: 0.03 K/UL (ref 0–0.2)
BASOPHILS NFR BLD: 1 % (ref 0–2)
BILIRUB SERPL-MCNC: 0.4 MG/DL (ref 0.3–1.2)
BILIRUB UR QL STRIP: NEGATIVE
BUN SERPL-MCNC: 11 MG/DL (ref 8–23)
BUN/CREAT SERPL: 14 (ref 9–20)
CALCIUM SERPL-MCNC: 9.6 MG/DL (ref 8.6–10.4)
CHLORIDE SERPL-SCNC: 101 MMOL/L (ref 98–107)
CLARITY UR: ABNORMAL
CO2 SERPL-SCNC: 27 MMOL/L (ref 20–31)
COLOR UR: YELLOW
CREAT SERPL-MCNC: 0.8 MG/DL (ref 0.5–0.9)
EOSINOPHIL # BLD: <0.03 K/UL (ref 0–0.44)
EOSINOPHILS RELATIVE PERCENT: 0 % (ref 1–4)
EPI CELLS #/AREA URNS HPF: ABNORMAL /HPF (ref 0–5)
ERYTHROCYTE [DISTWIDTH] IN BLOOD BY AUTOMATED COUNT: 13.6 % (ref 11.8–14.4)
GFR SERPL CREATININE-BSD FRML MDRD: >60 ML/MIN/1.73M2
GLUCOSE SERPL-MCNC: 96 MG/DL (ref 70–99)
GLUCOSE UR STRIP-MCNC: NEGATIVE MG/DL
HCT VFR BLD AUTO: 42.8 % (ref 36.3–47.1)
HGB BLD-MCNC: 13.9 G/DL (ref 11.9–15.1)
HGB UR QL STRIP.AUTO: ABNORMAL
IMM GRANULOCYTES # BLD AUTO: 0.03 K/UL (ref 0–0.3)
IMM GRANULOCYTES NFR BLD: 1 %
KETONES UR STRIP-MCNC: NEGATIVE MG/DL
LEUKOCYTE ESTERASE UR QL STRIP: ABNORMAL
LYMPHOCYTES NFR BLD: 1.7 K/UL (ref 1.1–3.7)
LYMPHOCYTES RELATIVE PERCENT: 27 % (ref 24–43)
MCH RBC QN AUTO: 32.9 PG (ref 25.2–33.5)
MCHC RBC AUTO-ENTMCNC: 32.5 G/DL (ref 28.4–34.8)
MCV RBC AUTO: 101.4 FL (ref 82.6–102.9)
MONOCYTES NFR BLD: 0.61 K/UL (ref 0.1–1.2)
MONOCYTES NFR BLD: 10 % (ref 3–12)
NEUTROPHILS NFR BLD: 61 % (ref 36–65)
NEUTS SEG NFR BLD: 3.96 K/UL (ref 1.5–8.1)
NITRITE UR QL STRIP: NEGATIVE
NRBC BLD-RTO: 0 PER 100 WBC
PH UR STRIP: 6 [PH] (ref 5–8)
PLATELET # BLD AUTO: 220 K/UL (ref 138–453)
PMV BLD AUTO: 10.2 FL (ref 8.1–13.5)
POTASSIUM SERPL-SCNC: 4.1 MMOL/L (ref 3.7–5.3)
PROT SERPL-MCNC: 7.4 G/DL (ref 6.4–8.3)
PROT UR STRIP-MCNC: NEGATIVE MG/DL
RBC # BLD AUTO: 4.22 M/UL (ref 3.95–5.11)
RBC #/AREA URNS HPF: ABNORMAL /HPF (ref 0–2)
SODIUM SERPL-SCNC: 138 MMOL/L (ref 135–144)
SP GR UR STRIP: 1.02 (ref 1–1.03)
UROBILINOGEN UR STRIP-ACNC: NORMAL EU/DL (ref 0–1)
WBC #/AREA URNS HPF: ABNORMAL /HPF (ref 0–5)
WBC OTHER # BLD: 6.4 K/UL (ref 3.5–11.3)

## 2023-10-26 PROCEDURE — 6360000002 HC RX W HCPCS: Performed by: EMERGENCY MEDICINE

## 2023-10-26 PROCEDURE — 99285 EMERGENCY DEPT VISIT HI MDM: CPT

## 2023-10-26 PROCEDURE — 96375 TX/PRO/DX INJ NEW DRUG ADDON: CPT

## 2023-10-26 PROCEDURE — 70450 CT HEAD/BRAIN W/O DYE: CPT

## 2023-10-26 PROCEDURE — 96361 HYDRATE IV INFUSION ADD-ON: CPT

## 2023-10-26 PROCEDURE — 72125 CT NECK SPINE W/O DYE: CPT

## 2023-10-26 PROCEDURE — 2580000003 HC RX 258: Performed by: EMERGENCY MEDICINE

## 2023-10-26 PROCEDURE — 85025 COMPLETE CBC W/AUTO DIFF WBC: CPT

## 2023-10-26 PROCEDURE — 99221 1ST HOSP IP/OBS SF/LOW 40: CPT | Performed by: NURSE PRACTITIONER

## 2023-10-26 PROCEDURE — 81001 URINALYSIS AUTO W/SCOPE: CPT

## 2023-10-26 PROCEDURE — 71045 X-RAY EXAM CHEST 1 VIEW: CPT

## 2023-10-26 PROCEDURE — 80053 COMPREHEN METABOLIC PANEL: CPT

## 2023-10-26 PROCEDURE — 93005 ELECTROCARDIOGRAM TRACING: CPT | Performed by: EMERGENCY MEDICINE

## 2023-10-26 PROCEDURE — 96374 THER/PROPH/DIAG INJ IV PUSH: CPT

## 2023-10-26 RX ORDER — DIPHENHYDRAMINE HYDROCHLORIDE 50 MG/ML
25 INJECTION INTRAMUSCULAR; INTRAVENOUS ONCE
Status: COMPLETED | OUTPATIENT
Start: 2023-10-26 | End: 2023-10-26

## 2023-10-26 RX ORDER — METOCLOPRAMIDE HYDROCHLORIDE 5 MG/ML
10 INJECTION INTRAMUSCULAR; INTRAVENOUS ONCE
Status: COMPLETED | OUTPATIENT
Start: 2023-10-26 | End: 2023-10-26

## 2023-10-26 RX ORDER — MORPHINE SULFATE 2 MG/ML
2 INJECTION, SOLUTION INTRAMUSCULAR; INTRAVENOUS ONCE
Status: COMPLETED | OUTPATIENT
Start: 2023-10-26 | End: 2023-10-26

## 2023-10-26 RX ORDER — 0.9 % SODIUM CHLORIDE 0.9 %
1000 INTRAVENOUS SOLUTION INTRAVENOUS ONCE
Status: COMPLETED | OUTPATIENT
Start: 2023-10-26 | End: 2023-10-26

## 2023-10-26 RX ADMIN — SODIUM CHLORIDE 1000 ML: 9 INJECTION, SOLUTION INTRAVENOUS at 21:08

## 2023-10-26 RX ADMIN — MORPHINE SULFATE 2 MG: 2 INJECTION, SOLUTION INTRAMUSCULAR; INTRAVENOUS at 21:08

## 2023-10-26 RX ADMIN — DIPHENHYDRAMINE HYDROCHLORIDE 25 MG: 50 INJECTION INTRAMUSCULAR; INTRAVENOUS at 21:08

## 2023-10-26 RX ADMIN — METOCLOPRAMIDE HYDROCHLORIDE 10 MG: 5 INJECTION INTRAMUSCULAR; INTRAVENOUS at 21:08

## 2023-10-26 ASSESSMENT — PAIN SCALES - GENERAL: PAINLEVEL_OUTOF10: 7

## 2023-10-26 ASSESSMENT — PAIN - FUNCTIONAL ASSESSMENT
PAIN_FUNCTIONAL_ASSESSMENT: 0-10
PAIN_FUNCTIONAL_ASSESSMENT: ACTIVITIES ARE NOT PREVENTED

## 2023-10-26 ASSESSMENT — PAIN DESCRIPTION - LOCATION: LOCATION: HEAD

## 2023-10-26 ASSESSMENT — PAIN DESCRIPTION - DESCRIPTORS: DESCRIPTORS: ACHING

## 2023-10-26 ASSESSMENT — PAIN DESCRIPTION - ORIENTATION: ORIENTATION: LEFT;RIGHT

## 2023-10-26 NOTE — TELEPHONE ENCOUNTER
Pts granddaughter called stating that pt is getting worse, has had some episodes of falling, pt is needing 24 hour care and [facility] is not able to do this, pts confusion is getting worse     Jarrell Mckeon was advised to take pt thru the ER to get help with facility placement

## 2023-10-27 ENCOUNTER — APPOINTMENT (OUTPATIENT)
Dept: MRI IMAGING | Age: 88
DRG: 102 | End: 2023-10-27
Payer: MEDICARE

## 2023-10-27 PROBLEM — Z86.73: Status: ACTIVE | Noted: 2021-10-31

## 2023-10-27 PROBLEM — D68.69 HYPERCOAGULABILITY DUE TO ATRIAL FIBRILLATION (HCC): Status: ACTIVE | Noted: 2023-10-27

## 2023-10-27 PROBLEM — R26.89 MULTIFACTORIAL GAIT DISORDER: Status: ACTIVE | Noted: 2023-10-27

## 2023-10-27 PROBLEM — G44.89 OTHER HEADACHE SYNDROME: Status: ACTIVE | Noted: 2023-10-27

## 2023-10-27 PROBLEM — I48.91 HYPERCOAGULABILITY DUE TO ATRIAL FIBRILLATION (HCC): Status: ACTIVE | Noted: 2023-10-27

## 2023-10-27 PROBLEM — F01.50 VASCULAR DEMENTIA WITHOUT BEHAVIORAL DISTURBANCE, PSYCHOTIC DISTURBANCE, MOOD DISTURBANCE, OR ANXIETY (HCC): Status: ACTIVE | Noted: 2023-10-27

## 2023-10-27 LAB
AMMONIA PLAS-SCNC: 18 UMOL/L (ref 11–51)
CHOLEST SERPL-MCNC: 165 MG/DL
CHOLESTEROL/HDL RATIO: 2.4
EST. AVERAGE GLUCOSE BLD GHB EST-MCNC: 88 MG/DL
FOLATE SERPL-MCNC: 6.4 NG/ML (ref 4.8–24.2)
HBA1C MFR BLD: 4.7 % (ref 4–6)
HDLC SERPL-MCNC: 68 MG/DL
INR PPP: 1
LDLC SERPL CALC-MCNC: 83 MG/DL (ref 0–130)
PROTHROMBIN TIME: 13.3 SEC (ref 11.5–14.2)
TRIGL SERPL-MCNC: 69 MG/DL
TSH SERPL DL<=0.05 MIU/L-ACNC: 4.33 UIU/ML (ref 0.3–5)
VIT B12 SERPL-MCNC: 457 PG/ML (ref 232–1245)

## 2023-10-27 PROCEDURE — 97116 GAIT TRAINING THERAPY: CPT

## 2023-10-27 PROCEDURE — 80061 LIPID PANEL: CPT

## 2023-10-27 PROCEDURE — 99232 SBSQ HOSP IP/OBS MODERATE 35: CPT | Performed by: STUDENT IN AN ORGANIZED HEALTH CARE EDUCATION/TRAINING PROGRAM

## 2023-10-27 PROCEDURE — 83036 HEMOGLOBIN GLYCOSYLATED A1C: CPT

## 2023-10-27 PROCEDURE — 84443 ASSAY THYROID STIM HORMONE: CPT

## 2023-10-27 PROCEDURE — 82140 ASSAY OF AMMONIA: CPT

## 2023-10-27 PROCEDURE — 85610 PROTHROMBIN TIME: CPT

## 2023-10-27 PROCEDURE — G0378 HOSPITAL OBSERVATION PER HR: HCPCS

## 2023-10-27 PROCEDURE — 97166 OT EVAL MOD COMPLEX 45 MIN: CPT

## 2023-10-27 PROCEDURE — 82607 VITAMIN B-12: CPT

## 2023-10-27 PROCEDURE — 2580000003 HC RX 258: Performed by: STUDENT IN AN ORGANIZED HEALTH CARE EDUCATION/TRAINING PROGRAM

## 2023-10-27 PROCEDURE — 97535 SELF CARE MNGMENT TRAINING: CPT

## 2023-10-27 PROCEDURE — 82746 ASSAY OF FOLIC ACID SERUM: CPT

## 2023-10-27 PROCEDURE — 96365 THER/PROPH/DIAG IV INF INIT: CPT

## 2023-10-27 PROCEDURE — 6370000000 HC RX 637 (ALT 250 FOR IP): Performed by: PSYCHIATRY & NEUROLOGY

## 2023-10-27 PROCEDURE — 97163 PT EVAL HIGH COMPLEX 45 MIN: CPT

## 2023-10-27 PROCEDURE — 6370000000 HC RX 637 (ALT 250 FOR IP): Performed by: NURSE PRACTITIONER

## 2023-10-27 PROCEDURE — 6360000002 HC RX W HCPCS: Performed by: STUDENT IN AN ORGANIZED HEALTH CARE EDUCATION/TRAINING PROGRAM

## 2023-10-27 PROCEDURE — 2580000003 HC RX 258: Performed by: NURSE PRACTITIONER

## 2023-10-27 PROCEDURE — 99222 1ST HOSP IP/OBS MODERATE 55: CPT | Performed by: PSYCHIATRY & NEUROLOGY

## 2023-10-27 PROCEDURE — 36415 COLL VENOUS BLD VENIPUNCTURE: CPT

## 2023-10-27 PROCEDURE — 97530 THERAPEUTIC ACTIVITIES: CPT

## 2023-10-27 PROCEDURE — 70551 MRI BRAIN STEM W/O DYE: CPT

## 2023-10-27 PROCEDURE — 84425 ASSAY OF VITAMIN B-1: CPT

## 2023-10-27 RX ORDER — SODIUM CHLORIDE 0.9 % (FLUSH) 0.9 %
10 SYRINGE (ML) INJECTION PRN
Status: DISCONTINUED | OUTPATIENT
Start: 2023-10-27 | End: 2023-10-31 | Stop reason: HOSPADM

## 2023-10-27 RX ORDER — POTASSIUM CHLORIDE 7.45 MG/ML
10 INJECTION INTRAVENOUS PRN
Status: DISCONTINUED | OUTPATIENT
Start: 2023-10-27 | End: 2023-10-31 | Stop reason: HOSPADM

## 2023-10-27 RX ORDER — ACETAMINOPHEN 650 MG/1
650 SUPPOSITORY RECTAL EVERY 6 HOURS PRN
Status: DISCONTINUED | OUTPATIENT
Start: 2023-10-27 | End: 2023-10-31 | Stop reason: HOSPADM

## 2023-10-27 RX ORDER — HEPARIN SODIUM 5000 [USP'U]/ML
5000 INJECTION, SOLUTION INTRAVENOUS; SUBCUTANEOUS EVERY 8 HOURS SCHEDULED
Status: DISCONTINUED | OUTPATIENT
Start: 2023-10-27 | End: 2023-10-31 | Stop reason: HOSPADM

## 2023-10-27 RX ORDER — SODIUM CHLORIDE 0.9 % (FLUSH) 0.9 %
5-40 SYRINGE (ML) INJECTION EVERY 12 HOURS SCHEDULED
Status: DISCONTINUED | OUTPATIENT
Start: 2023-10-27 | End: 2023-10-31 | Stop reason: HOSPADM

## 2023-10-27 RX ORDER — ONDANSETRON 2 MG/ML
4 INJECTION INTRAMUSCULAR; INTRAVENOUS EVERY 6 HOURS PRN
Status: DISCONTINUED | OUTPATIENT
Start: 2023-10-27 | End: 2023-10-31 | Stop reason: HOSPADM

## 2023-10-27 RX ORDER — ASPIRIN 81 MG/1
81 TABLET ORAL DAILY
Status: DISCONTINUED | OUTPATIENT
Start: 2023-10-27 | End: 2023-10-31 | Stop reason: HOSPADM

## 2023-10-27 RX ORDER — POTASSIUM CHLORIDE 20 MEQ/1
40 TABLET, EXTENDED RELEASE ORAL PRN
Status: DISCONTINUED | OUTPATIENT
Start: 2023-10-27 | End: 2023-10-31 | Stop reason: HOSPADM

## 2023-10-27 RX ORDER — SODIUM CHLORIDE 9 MG/ML
INJECTION, SOLUTION INTRAVENOUS PRN
Status: DISCONTINUED | OUTPATIENT
Start: 2023-10-27 | End: 2023-10-31 | Stop reason: HOSPADM

## 2023-10-27 RX ORDER — MAGNESIUM SULFATE 1 G/100ML
1000 INJECTION INTRAVENOUS PRN
Status: DISCONTINUED | OUTPATIENT
Start: 2023-10-27 | End: 2023-10-31 | Stop reason: HOSPADM

## 2023-10-27 RX ORDER — ONDANSETRON 4 MG/1
4 TABLET, ORALLY DISINTEGRATING ORAL EVERY 8 HOURS PRN
Status: DISCONTINUED | OUTPATIENT
Start: 2023-10-27 | End: 2023-10-31 | Stop reason: HOSPADM

## 2023-10-27 RX ORDER — ACETAMINOPHEN 325 MG/1
650 TABLET ORAL EVERY 6 HOURS PRN
Status: DISCONTINUED | OUTPATIENT
Start: 2023-10-27 | End: 2023-10-31 | Stop reason: HOSPADM

## 2023-10-27 RX ORDER — POLYETHYLENE GLYCOL 3350 17 G/17G
17 POWDER, FOR SOLUTION ORAL DAILY PRN
Status: DISCONTINUED | OUTPATIENT
Start: 2023-10-27 | End: 2023-10-31 | Stop reason: HOSPADM

## 2023-10-27 RX ADMIN — ACETAMINOPHEN 650 MG: 325 TABLET ORAL at 06:31

## 2023-10-27 RX ADMIN — SODIUM CHLORIDE, PRESERVATIVE FREE 10 ML: 5 INJECTION INTRAVENOUS at 09:33

## 2023-10-27 RX ADMIN — ASPIRIN 81 MG: 81 TABLET, COATED ORAL at 18:43

## 2023-10-27 RX ADMIN — SODIUM CHLORIDE, PRESERVATIVE FREE 10 ML: 5 INJECTION INTRAVENOUS at 20:08

## 2023-10-27 RX ADMIN — CEFTRIAXONE SODIUM 1000 MG: 1 INJECTION, POWDER, FOR SOLUTION INTRAMUSCULAR; INTRAVENOUS at 11:42

## 2023-10-27 RX ADMIN — ACETAMINOPHEN 650 MG: 325 TABLET ORAL at 18:43

## 2023-10-27 ASSESSMENT — PAIN SCALES - GENERAL
PAINLEVEL_OUTOF10: 6
PAINLEVEL_OUTOF10: 3
PAINLEVEL_OUTOF10: 7

## 2023-10-27 ASSESSMENT — PAIN - FUNCTIONAL ASSESSMENT: PAIN_FUNCTIONAL_ASSESSMENT: ACTIVITIES ARE NOT PREVENTED

## 2023-10-27 ASSESSMENT — ENCOUNTER SYMPTOMS
NAUSEA: 0
ABDOMINAL PAIN: 0
VOMITING: 0
SHORTNESS OF BREATH: 0

## 2023-10-27 ASSESSMENT — PAIN DESCRIPTION - LOCATION
LOCATION: HEAD
LOCATION: HEAD

## 2023-10-27 ASSESSMENT — PAIN DESCRIPTION - PAIN TYPE: TYPE: ACUTE PAIN

## 2023-10-27 ASSESSMENT — PAIN DESCRIPTION - DESCRIPTORS
DESCRIPTORS: ACHING
DESCRIPTORS: ACHING

## 2023-10-27 ASSESSMENT — PAIN DESCRIPTION - ORIENTATION: ORIENTATION: RIGHT

## 2023-10-27 ASSESSMENT — PAIN DESCRIPTION - FREQUENCY: FREQUENCY: CONTINUOUS

## 2023-10-27 ASSESSMENT — PAIN DESCRIPTION - ONSET: ONSET: ON-GOING

## 2023-10-27 NOTE — PROGRESS NOTES
[facility]  Office: [phone]  Chele Blackman DO, Adriana Klein DO, Chance Grimaldo DO, Kb Alvarez, DO, Oziel Desai MD, Breana Christian MD, Aviva Ayala MD, Manohar Porter MD,  Vicente Armstrong MD, Nalini Alarcon MD, Jeannette Cohen MD,  Ruma Albarran MD, Bere Singh MD, Bella Rubin DO, Baudilio Cunningham MD,  Maisha Flores DO, Jin Colvin MD, Lindsey Doran MD, Glen Garcia MD, Freda Cano MD,  Arash Mccormick MD, Angelo Thompson MD, Marah Mares MD, Amor Rice MD, Renteta Granado MD, Karely Rosales DO, Brendan Alejandre DO, Nick Capps MD,  Luci Neil MD, Dylan Medrano, CNP,  Bryson Gomez, CNP, Mishel Carl, CNP,  Primo Nunez, DNP, Kellee De Santiago, CNP, Bandar Hora, CNP, Monster Lucas, CNP, Irma Combs, CNP, Forrest Enriquez, CNP, Henri Kellogg, CNP, Radha Olsen, CNS, Mercer Fabry, CNP, Anya Peña, [address] [facility]    Progress Note    10/27/2023    3:50 PM    Name:   Abdi Serna  MRN:     [id number]     Acct:      [de-identified]   Room:   2006/2006-02   Day:  0  Admit Date:  10/26/2023  8:22 PM    PCP:   Atiya Cm MD  Code Status:  DNR-CCA    Subjective:     C/C:   Chief Complaint   Patient presents with    Headache     Migraine hx and 2 past strokes    Hallucinations    Fall     Last one 12 hours pta, fell off the bed     Social work consult     SNF placement      Interval History Status: not changed. Patient is alert to place, person, time however she does not understand the situation and keeps asking how she ended up in the hospital and who got her here. She does not have any acute complaints, trying to get out of the bed. Patient states that she has headache on the day and is both in the front and the back. She states she has migraines.     Labs and vitals reviewed   CT scan shows chronic infarcts which were not present on the previous MRI cardiopulmonary pathology.        Physical Examination:        General appearance:  alert, cooperative and no distress, confused to situation  Mental Status:  oriented to time, person  Lungs:  clear to auscultation bilaterally, normal effort  Heart: afib  Abdomen:  soft, nontender, nondistended, normal bowel sounds, no masses, hepatomegaly, splenomegaly  Extremities:  no edema, redness, tenderness in the calves  Skin:  no gross lesions, rashes, induration    Assessment:        Hospital Problems             Last Modified POA    * (Principal) Unable to care for self 10/26/2023 Yes    Essential hypertension 10/27/2023 Yes    Hx of ischemic right PCA stroke 10/27/2023 Yes    Other headache syndrome 10/27/2023 Yes    Vascular dementia without behavioral disturbance, psychotic disturbance, mood disturbance, or anxiety ([address]) 10/27/2023 Yes    Multifactorial gait disorder 10/27/2023 Yes       Plan:        Check MRI, rule out new stroke  Afib history, always ar risk of stroke  Continue aspirin  Patient will be a high risk if started on anticoagulant given recurrent falls with dementia  Follow up on tsh, b12, folate  Watch for delirium and continue to reorient  Will treat mild uti with short course rocephin, send urine culture  Discharge planning    Luis Holland MD  10/27/2023  3:50 PM

## 2023-10-27 NOTE — CARE COORDINATION
Case Management Assessment  Initial Evaluation    Date/Time of Evaluation: 10/27/2023 9:35 AM  Assessment Completed by: Renetta Sparks RN    If patient is discharged prior to next notation, then this note serves as note for discharge by case management. Patient Name: Shanae Garcia                   YOB: 1930  Diagnosis: Nonintractable headache, unspecified chronicity pattern, unspecified headache type [R51.9]  Ischemic cerebrovascular accident (CVA) ([address]) [I63.9]  Unable to care for self [Z78.9]  Vascular dementia without behavioral disturbance, psychotic disturbance, mood disturbance, or anxiety, unspecified dementia severity ([address]) [F01.50]                   Date / Time: 10/26/2023  8:22 PM    Patient Admission Status: Observation   Readmission Risk (Low < 19, Mod (19-27), High > 27): No data recorded  Current PCP: Jay Acosta MD  PCP verified by CM? (P) Yes    Chart Reviewed: Yes      History Provided by: (P) Patient, Medical Record, Other (see comment) (granddaughter Elin Pearblossom)  Patient Orientation: (P) Alert and Oriented, Person, Place, Situation, Other (see comment) (patient answered  all orientation questions correctly at this time, JACKSON explained and patient signed, given copy)    Patient Cognition: (P) Alert    Hospitalization in the last 30 days (Readmission):  No    If yes, Readmission Assessment in CM Navigator will be completed.     Advance Directives:      Code Status: Full Code   Patient's Primary Decision Maker is: (P) Named in [address] Elin Pearblossom)    Primary Decision Maker: [address] - Emergency Contact - [phone]    Discharge Planning:    Patient lives with: (P) Other (Comment) (No longer safely ambulating at home, falls x 2) Type of Home: (P) Acute Rehab, Skilled Nursing Facility  Primary Care Giver: (P) Family  Patient Support Systems include: (P) Family Members   Current Financial resources: (P) Medicare, Other (Comment) (Social security)  Current

## 2023-10-27 NOTE — H&P
[facility]  Office: [phone], DO, Memo Saab, DO, Digna Mccray, DO, Karen Marx Blood, DO, Vik Mills MD, Augusto Zapien MD, Leeann Waite MD, Ebbie Meigs, MD,  Dominick Dhaliwal MD, Suzette Fisher MD, Jaida Hammonds MD,  Praveen Xie MD, Matthew Conde MD, Angelica Greene DO, Luisa Walters MD,  Kellie Garcia, DO, Verner Crandall, MD, Kalin Joseph MD, Yulissa Griffin MD, Ghulam Stanley MD,  Arturo Robles MD, Carolee Gibson MD, Gayatri Toledo MD, Aracelis Weber MD, August Barboza MD, April Parker DO, Eliseo Dsouza, DO, Richie Silva MD,  Ismael Hall MD, Rachael Dickey, CNP,  Meaghan Quinn, CNP, Herminio Waldron, CNP,  Sakina Casey, DNP, Noman Whittington, CNP, Antoine Mckenna, CNP, Angela Weinberg, CNP, Jacqueline Thakur, CNP, Bessy Dinh, CNP, Kelly Mendez, CNP, Brunilda Olmedo, CNS, Nayana Pearson, CNP, Anni Tang, [facility]    HISTORY AND PHYSICAL EXAMINATION            Date:   10/27/2023  Patient name:  Radames Dasilva  Date of admission:  10/26/2023  8:22 PM  MRN:   [id number]  Account:  [de-identified]  YOB: 1930  PCP:    Steven Donato MD  Room:   2006/2006-02  Code Status:    Full Code    Chief Complaint:     Chief Complaint   Patient presents with    Headache     Migraine hx and 2 past strokes    Hallucinations    Fall     Last one 12 hours pta, fell off the bed     Social work consult     SNF placement        History Obtained From:     patient, electronic medical record    History of Present Illness:     Leonie Conn is a 80 y.o. Non- / non  female who presents with Headache (Migraine hx and 2 past strokes), Hallucinations, Fall (Last one 12 hours pta, fell off the bed ), and Social work consult (SNF placement )   and is admitted to the hospital for the management of Unable to care for self.     This is a very pleasant 89 year lacunar infarct right thalamus, new since March 2023 MRI. Age-indeterminate but likely chronic infarct right occipital lobe, new since March 2023 MRI. XR CHEST PORTABLE    Result Date: 10/26/2023  1. Age indeterminate left rib deformities, correlate with physical exam. 2. No radiographic evidence of acute cardiopulmonary pathology. Assessment :      Hospital Problems             Last Modified POA    * (Principal) Unable to care for self 10/26/2023 Yes    Essential hypertension 10/27/2023 Yes    Other headache syndrome 10/27/2023 Yes       Plan:     Patient status observation in the  Med/Surge    History of recurrent falls. Increased confusion. Family unable to provide ATC care. Needs placement. History of HTN: No medications on home med list. B/P 830-123 systolic. Start low dose Norvasc. Recurrent headaches. Has undergone extensive workup, Continue prn analgesia for comfort. Fall precautions. CM consult. Appreciate help in finding placement for safe discharge. Consultations:   IP CONSULT TO INTERNAL MEDICINE     Patient is admitted as inpatient status because of co-morbidities listed above, severity of signs and symptoms as outlined, requirement for current medical therapies and most importantly because of direct risk to patient if care not provided in a hospital setting. Expected length of stay > 48 hours.     GABBIE Cruz - NP  10/27/2023  2:17 AM    Copy sent to Dr. Kim Saenz MD

## 2023-10-27 NOTE — CONSULTS
[facility]   IN-PATIENT SERVICE      NEUROLOGY CONSULT  NOTE            Date:   10/27/2023  Patient name:  Erma Dasilva  Date of admission:  10/26/2023  YOB: 1930      Chief Complaint:     Chief Complaint   Patient presents with    Headache     Migraine hx and 2 past strokes    Hallucinations    Fall     Last one 12 hours pta, fell off the bed     Social work consult     SNF placement        Reason for Consult:      Frequent falls, abnormal CT brain    History of Present Illness:     80year old female with past medical hx of afib not on AC, CAD, migraine, right PCA stroke, dementia, left trigeminal nerve schwannoma, presents for hallucinations, frequent falls. History obtained from patient, granddaughter over phone (Shaan Bond) and chart review. Patient follows with Dr. Maria E Rodas outpatient. She has a history of dementia. Also had a right PCA stroke in April 2023. There was initial concern for possible amyloid angiopathy hence she was not on anticoagulation for A-fib. At baseline, granddaughter reports that she is oriented x1-2, uses a walker for ambulation, requires assistance with ADLs. Over the last 4 to 6 weeks, she reports that patient has been having more hallucinations, trouble sleeping at night. During her hallucinations, she has also been falling, no recent head trauma. Patient was hospitalized in July at [facility] for left-sided facial asymmetry and dysarthria. Her symptoms resolved. MRI with no acute findings. GRE sequences showed improvement in microbleeds, not concerning for amyloid. There was discussion about restarting anticoagulation. Ultimately, decision was left to family whether they wanted to start Eliquis 2.5 twice daily versus continuing aspirin. Granddaughter reports that due to risk of bleeding, age, frequent falls, they opted to defer anticoagulation and continue aspirin 81 mg daily. Since then, she has remained stable on this.   No dysarthria, aphasia. Cranial nerves   II - visual fields with ?left homonymous hemianopia; pupils reactive (4 mm OU)  III, IV, VI - extraocular muscles intact; no YORDY; no nystagmus; no ptosis   V - normal facial sensation                                                               VII - mild left facial droop corrects with smile                                                         VIII - hearing decreased bilaterally                                                                        IX, X - symmetrical palate elevation                                               XI - symmetrical shoulder shrug                                                       XII - midline tongue without atrophy or fasciculation     Motor function  Strength: 4+/5 throughout. Normal bulk and tone. No tremors                      Sensory function Intact to touch and pinprick. Diminished vibration and proprioception at the toes bilaterally. Cerebellar Intact finger-nose-finger testing. Reflex function 1/4 symmetric throughout . Downgoing plantar response bilaterally.  (-)Novoa's sign bilaterally    Gait                  Cautious, uses walker (baseline)             Diagnostics:      Laboratory Testing:  CBC:   Recent Labs     10/26/23  2105   WBC 6.4   HGB 13.9        BMP:    Recent Labs     10/26/23  2105      K 4.1      CO2 27   BUN 11   CREATININE 0.8   GLUCOSE 96         Lab Results   Component Value Date    CHOL 185 08/23/2018    LDLCHOLESTEROL 107 08/23/2018    HDL 59 08/23/2018    TRIG 93 08/23/2018    ALT 14 10/26/2023    AST 19 10/26/2023    TSH 3.06 08/23/2018    INR 1.0 10/27/2023    NAQPZXIU79 998 08/23/2018    MG 2.0 06/27/2017         Imaging/Diagnostics:      Results for orders placed during the hospital encounter of 03/03/23    MRI BRAIN W WO CONTRAST    Narrative  EXAMINATION:  MRI OF THE BRAIN WITHOUT AND WITH CONTRAST  3/3/2023 2:36 pm    TECHNIQUE:  Multiplanar multisequence MRI of

## 2023-10-27 NOTE — CARE COORDINATION
Social Work-Spoke with granddaughter regarding [employer]. She would like patient to go to rehab at [facility].  Sent referral. Manisha Gerardo

## 2023-10-27 NOTE — PROGRESS NOTES
Physical Therapy  Facility/Department: [facility] MED SURG  Physical Therapy Initial Assessment    Name: Chris Garcia  : 1930  MRN: [id number]  Date of Service: 10/27/2023    Discharge Recommendations:  Patient would benefit from continued therapy after discharge          Patient Diagnosis(es): The primary encounter diagnosis was Vascular dementia without behavioral disturbance, psychotic disturbance, mood disturbance, or anxiety, unspecified dementia severity ([address]). Diagnoses of Nonintractable headache, unspecified chronicity pattern, unspecified headache type and Ischemic cerebrovascular accident (CVA) ([address]) were also pertinent to this visit. Past Medical History:  has a past medical history of Allergic rhinitis, Angina pectoris ([address]), CAD (coronary artery disease), Congenital heart disease, COPD (chronic obstructive pulmonary disease) ([address]), Headache(784.0), Hypothyroidism, Obesity, Peptic ulcer, Pure hypercholesterolemia, and Urinary incontinence. Past Surgical History:  has a past surgical history that includes Appendectomy and Knee arthroscopy. Assessment   Body Structures, Functions, Activity Limitations Requiring Skilled Therapeutic Intervention: Decreased functional mobility ; Decreased endurance;Decreased balance;Decreased posture;Decreased strength;Decreased sensation  Assessment: Limited by poor activity tolerance,general weakness  Therapy Prognosis: Good  Decision Making: High Complexity  Requires PT Follow-Up: Yes  Activity Tolerance  Activity Tolerance: Patient limited by endurance     Plan   Physcial Therapy Plan  General Plan: 5-7 times per week  Current Treatment Recommendations: Strengthening, Balance training, Functional mobility training, Transfer training, Gait training, Stair training, Endurance training, Therapeutic activities (Posture)  Safety Devices  Type of Devices: Gait belt, Left in bed, Call light within reach, Bed alarm in place, Nurse notified  Restraints  Restraints

## 2023-10-27 NOTE — PROGRESS NOTES
Adm to room 2006; oriented to call light; bed controls; phone; safety;medications; rounding/hospital routine; forgetful many repeat questions

## 2023-10-27 NOTE — ED PROVIDER NOTES
EMERGENCY DEPARTMENT ENCOUNTER    Pt Name: Finn Sanders  MRN: [id number]  [address] 8/16/1930  Date of evaluation: 10/27/23  CHIEF COMPLAINT       Chief Complaint   Patient presents with    Headache     Migraine hx and 2 past strokes    Hallucinations    Fall     Last one 12 hours pta, fell off the bed     Social work consult     SNF placement      HISTORY OF PRESENT ILLNESS   HPI  80-year-old female with a history of COPD, CAD, multiple strokes, vascular dementia, A-fib not on anticoagulation presents to the ED for headache, frequent falls, needing placement as family no longer able to care for her. Patient states that she has been having headaches for decades, states that the headache today is consistent with her usual.  Granddaughter at bedside states that her grandmother seems to be having progressive decline of her cognitive and physical status for some months. She is intermittently getting confused, having hallucinations, seems to be worse in the evening. Granddaughter also states that patient is having mild falls, she had 2 in the last day. Granddaughter states that patient currently lives with her and her  but they are no longer able to care for her the way she needs. Patient denies chest pain, shortness of breath, abdominal pain, diarrhea, dysuria or any other acute complaints. REVIEW OF SYSTEMS     Review of Systems   All other systems reviewed and are negative.     PASTMEDICAL HISTORY     Past Medical History:   Diagnosis Date    Allergic rhinitis     Angina pectoris (HCC)     CAD (coronary artery disease)     Congenital heart disease     COPD (chronic obstructive pulmonary disease) (HCC)     Headache(784.0)     Hypothyroidism     Obesity     Peptic ulcer     Pure hypercholesterolemia 6/7/2017    Urinary incontinence      SURGICAL HISTORY       Past Surgical History:   Procedure Laterality Date    APPENDECTOMY      KNEE ARTHROSCOPY       CURRENT MEDICATIONS       Current Discharge

## 2023-10-27 NOTE — PROGRESS NOTES
Occupational Therapy  Facility/Department: [facility] MED SURG  Occupational Therapy Initial Assessment    Name: Rober Tan  : 1930  MRN: [id number]  Date of Service: 10/27/2023    JACK HOOK reports patient is medically stable for therapy treatment this date. Chart reviewed prior to treatment and patient is agreeable for therapy. All lines intact and patient positioned comfortably at end of treatment. All patient needs addressed prior to ending therapy session. Pt currently functioning below baseline. Recommend daily inpatient skilled therapy at time of discharge to maximize long term outcomes and prevent re-admission. Please refer to AM-PAC score for current level of function. Discharge Recommendations:  Patient would benefit from continued therapy after discharge  OT Equipment Recommendations  Equipment Needed:  (CTA)       Per H&P: Rober Tan is a 80 y.o. Non- / non  female who presents with Headache (Migraine hx and 2 past strokes), Hallucinations, Fall (Last one 12 hours pta, fell off the bed ), and Social work consult (SNF placement )   and is admitted to the hospital for the management of Unable to care for self. This is a very pleasant 80year old who reportedly was brought to ED for admission 2/2 need for long term placement. She reportedly has had increased confusion and multiple falls without injury. She lives with her granddaughter who called PCP today and said that she is requiring more care and would like to seek placement in long-term care facility for her. She was directed to take her to the hospital for admission. She has a history of chronic migraine headaches. No family is present at time of exam to verify history and home medications list.     Patient Diagnosis(es): The primary encounter diagnosis was Vascular dementia without behavioral disturbance, psychotic disturbance, mood disturbance, or anxiety, unspecified dementia severity ([address] tasks to SBA with Min cues for safety and use of AD as needed. Short Term Goal 3: UB ADLs to SBA and LB ADLs to [facility] with Min cues for safety and use of AD/AE/compensatory strategies as needed. Short Term Goal 4: toileting tasks to CGA with Min cues for safety and use of AD/grab bars/BSC as needed. Short Term Goal 5: increased BUE strength by 1/2 grade to promote functional strength/ROM required for increased safety & participation in ADL tasks. Long Term Goals  Long Term Goal 1: Caregivers to be IND with pressure relief tech, fall prevention strategies, EC/WS tech, equipment/discharge recommendations, and a BUE HEP with use of handouts as needed. Patient Goals   Patient goals : To get stronger, to walk well!        Therapy Time   Individual Concurrent Group Co-treatment   Time In 0843 (+10 mins for chart review & RN coordination)         Time Out 0930         Minutes 47+10=57         Tx Time: 38 minutes       Ardell Galeazzi, OT

## 2023-10-28 PROBLEM — R41.82 AMS (ALTERED MENTAL STATUS): Status: ACTIVE | Noted: 2023-10-28

## 2023-10-28 LAB
BILIRUB UR QL STRIP: NEGATIVE
CLARITY UR: CLEAR
COLOR UR: YELLOW
EKG ATRIAL RATE: 75 BPM
EKG Q-T INTERVAL: 394 MS
EKG QRS DURATION: 74 MS
EKG QTC CALCULATION (BAZETT): 449 MS
EKG R AXIS: -23 DEGREES
EKG T AXIS: 11 DEGREES
EKG VENTRICULAR RATE: 78 BPM
GLUCOSE UR STRIP-MCNC: NEGATIVE MG/DL
HGB UR QL STRIP.AUTO: NEGATIVE
KETONES UR STRIP-MCNC: NEGATIVE MG/DL
LEUKOCYTE ESTERASE UR QL STRIP: NEGATIVE
NITRITE UR QL STRIP: NEGATIVE
PH UR STRIP: 5.5 [PH] (ref 5–8)
PROT UR STRIP-MCNC: NEGATIVE MG/DL
SP GR UR STRIP: 1.01 (ref 1–1.03)
UROBILINOGEN UR STRIP-ACNC: NORMAL EU/DL (ref 0–1)

## 2023-10-28 PROCEDURE — 6360000002 HC RX W HCPCS: Performed by: STUDENT IN AN ORGANIZED HEALTH CARE EDUCATION/TRAINING PROGRAM

## 2023-10-28 PROCEDURE — 2580000003 HC RX 258: Performed by: STUDENT IN AN ORGANIZED HEALTH CARE EDUCATION/TRAINING PROGRAM

## 2023-10-28 PROCEDURE — 99232 SBSQ HOSP IP/OBS MODERATE 35: CPT | Performed by: STUDENT IN AN ORGANIZED HEALTH CARE EDUCATION/TRAINING PROGRAM

## 2023-10-28 PROCEDURE — 6370000000 HC RX 637 (ALT 250 FOR IP): Performed by: NURSE PRACTITIONER

## 2023-10-28 PROCEDURE — 81003 URINALYSIS AUTO W/O SCOPE: CPT

## 2023-10-28 PROCEDURE — 92610 EVALUATE SWALLOWING FUNCTION: CPT

## 2023-10-28 PROCEDURE — 97116 GAIT TRAINING THERAPY: CPT

## 2023-10-28 PROCEDURE — 96376 TX/PRO/DX INJ SAME DRUG ADON: CPT

## 2023-10-28 PROCEDURE — 96372 THER/PROPH/DIAG INJ SC/IM: CPT

## 2023-10-28 PROCEDURE — 87086 URINE CULTURE/COLONY COUNT: CPT

## 2023-10-28 PROCEDURE — 2580000003 HC RX 258: Performed by: NURSE PRACTITIONER

## 2023-10-28 PROCEDURE — 97530 THERAPEUTIC ACTIVITIES: CPT

## 2023-10-28 PROCEDURE — 6370000000 HC RX 637 (ALT 250 FOR IP): Performed by: PSYCHIATRY & NEUROLOGY

## 2023-10-28 PROCEDURE — 1200000000 HC SEMI PRIVATE

## 2023-10-28 PROCEDURE — 99232 SBSQ HOSP IP/OBS MODERATE 35: CPT | Performed by: PSYCHIATRY & NEUROLOGY

## 2023-10-28 RX ORDER — CEFDINIR 300 MG/1
300 CAPSULE ORAL 2 TIMES DAILY
Qty: 6 CAPSULE | Refills: 0 | Status: SHIPPED | OUTPATIENT
Start: 2023-10-28 | End: 2023-10-31

## 2023-10-28 RX ORDER — ASPIRIN 81 MG/1
81 TABLET ORAL DAILY
COMMUNITY

## 2023-10-28 RX ORDER — ACETAMINOPHEN 500 MG
500 TABLET ORAL EVERY 6 HOURS PRN
COMMUNITY

## 2023-10-28 RX ORDER — ASPIRIN 81 MG/1
81 TABLET ORAL DAILY
Qty: 30 TABLET | Refills: 3 | Status: SHIPPED | OUTPATIENT
Start: 2023-10-29 | End: 2023-10-31 | Stop reason: HOSPADM

## 2023-10-28 RX ADMIN — ACETAMINOPHEN 650 MG: 325 TABLET ORAL at 18:45

## 2023-10-28 RX ADMIN — SODIUM CHLORIDE, PRESERVATIVE FREE 10 ML: 5 INJECTION INTRAVENOUS at 09:51

## 2023-10-28 RX ADMIN — ACETAMINOPHEN 650 MG: 325 TABLET ORAL at 00:55

## 2023-10-28 RX ADMIN — HEPARIN SODIUM 5000 UNITS: 5000 INJECTION INTRAVENOUS; SUBCUTANEOUS at 13:26

## 2023-10-28 RX ADMIN — HEPARIN SODIUM 5000 UNITS: 5000 INJECTION INTRAVENOUS; SUBCUTANEOUS at 06:01

## 2023-10-28 RX ADMIN — CEFTRIAXONE SODIUM 1000 MG: 1 INJECTION, POWDER, FOR SOLUTION INTRAMUSCULAR; INTRAVENOUS at 09:57

## 2023-10-28 ASSESSMENT — PAIN SCALES - GENERAL
PAINLEVEL_OUTOF10: 0
PAINLEVEL_OUTOF10: 10
PAINLEVEL_OUTOF10: 7

## 2023-10-28 ASSESSMENT — PAIN DESCRIPTION - ORIENTATION: ORIENTATION: RIGHT

## 2023-10-28 ASSESSMENT — PAIN DESCRIPTION - ONSET: ONSET: ON-GOING

## 2023-10-28 ASSESSMENT — PAIN DESCRIPTION - PAIN TYPE: TYPE: ACUTE PAIN

## 2023-10-28 ASSESSMENT — PAIN DESCRIPTION - LOCATION
LOCATION: HEAD
LOCATION: HEAD

## 2023-10-28 ASSESSMENT — PAIN - FUNCTIONAL ASSESSMENT: PAIN_FUNCTIONAL_ASSESSMENT: ACTIVITIES ARE NOT PREVENTED

## 2023-10-28 ASSESSMENT — PAIN DESCRIPTION - DESCRIPTORS
DESCRIPTORS: ACHING;DISCOMFORT
DESCRIPTORS: ACHING

## 2023-10-28 ASSESSMENT — PAIN DESCRIPTION - FREQUENCY: FREQUENCY: CONTINUOUS

## 2023-10-28 NOTE — PROGRESS NOTES
SLP ALL NOTES  Facility/Department: [facility] MED SURG   CLINICAL BEDSIDE SWALLOW EVALUATION    NAME: Erlinda Dasilva  : 1930  MRN: [id number]    ADMISSION DATE: 10/26/2023  ADMITTING DIAGNOSIS: has Dizziness; Essential hypertension; Subcortical microvascular ischemic occlusive disease; Pure hypercholesterolemia; History of myasthenia gravis; Personal history of systemic lupus erythematosus (SLE) ([address]); Chronic migraine without aura without status migrainosus, not intractable; Primary osteoarthritis of knee; Cancer, skin, squamous cell; Actinic keratosis; History of TIAs; Hx of ischemic right PCA stroke; Schwannoma of cranial nerve ([address]); Chronic obstructive pulmonary disease, unspecified; Unable to care for self; Other headache syndrome; Vascular dementia without behavioral disturbance, psychotic disturbance, mood disturbance, or anxiety ([address]); Multifactorial gait disorder; Hypercoagulability due to atrial fibrillation ([address]); and AMS (altered mental status) on their problem list.  ONSET DATE: 10/26/23    Recent Chest Xray/CT of Chest: (CXR Date 10/26/23 )    IMPRESSION:  1. Age indeterminate left rib deformities, correlate with physical exam.  2. No radiographic evidence of acute cardiopulmonary pathology. Date of Eval: 10/28/2023  Evaluating Therapist: Nicholas Garza    Current Diet level:   Regular/thin    Primary Complaint   Obtained from H&P:  Daniel Chong is a 80 y.o. Non- / non  female who presents with Headache (Migraine hx and 2 past strokes), Hallucinations, Fall (Last one 12 hours pta, fell off the bed ), and Social work consult (SNF placement )   and is admitted to the hospital for the management of Unable to care for self. This is a very pleasant 80year old who reportedly was brought to ED for admission 2/2 need for long term placement. She reportedly has had increased confusion and multiple falls without injury.  She lives with her granddaughter who called PCP today and said

## 2023-10-28 NOTE — PROGRESS NOTES
Physical Therapy  Facility/Department: [facility] MED SURG  Daily Treatment Note  NAME: Jb Tapia  : 1930  MRN: [id number]    Date of Service: 10/28/2023    Discharge Recommendations:  Patient would benefit from continued therapy after discharge     Patient Diagnosis(es): The primary encounter diagnosis was Vascular dementia without behavioral disturbance, psychotic disturbance, mood disturbance, or anxiety, unspecified dementia severity ([address] Diagnoses of Nonintractable headache, unspecified chronicity pattern, unspecified headache type and Ischemic cerebrovascular accident (CVA) ([address]) were also pertinent to this visit. Assessment   Assessment: Pt with deficits in cognition, strength, endurance, balance requiring min to mod A for mobility. Pt would benefit from continued skilled therapy to maxmize independence with all functional tasks. AM-PAC score of  for current level of function. Activity Tolerance: Patient limited by endurance; Patient limited by fatigue;Treatment limited secondary to decreased cognition     Plan    Physcial Therapy Plan  General Plan: 5-7 times per week  Current Treatment Recommendations: Strengthening;Balance training;Functional mobility training;Transfer training;Gait training;Stair training; Endurance training; Therapeutic activities     Restrictions  Restrictions/Precautions  Restrictions/Precautions: General Precautions, Fall Risk, Bed Alarm  Required Braces or Orthoses?: No  Position Activity Restriction  Other position/activity restrictions: Up w/ assist, telemetry, external urinary catheter, RUE IV     Subjective    Subjective  Subjective: Pt in bed resting upon arrival for PT session.  Pt agreeable to participate in PT (Nurse gives approval for PT session)  Orientation  Overall Orientation Status: Impaired  Orientation Level: Oriented to person;Disoriented to place     Objective      Bed Mobility Training  Bed Mobility Training: Yes  Overall Level of Assistance:

## 2023-10-28 NOTE — PLAN OF CARE
Problem: Pain  Goal: Verbalizes/displays adequate comfort level or baseline comfort level  Outcome: Progressing  Flowsheets (Taken 10/28/2023 0304)  Verbalizes/displays adequate comfort level or baseline comfort level:   Encourage patient to monitor pain and request assistance   Assess pain using appropriate pain scale   Administer analgesics based on type and severity of pain and evaluate response   Implement non-pharmacological measures as appropriate and evaluate response     Problem: Safety - Adult  Goal: Free from fall injury  Outcome: Progressing  Flowsheets (Taken 10/28/2023 0304)  Free From Fall Injury: Instruct family/caregiver on patient safety     Problem: ABCDS Injury Assessment  Goal: Absence of physical injury  Outcome: Progressing  Flowsheets (Taken 10/28/2023 0304)  Absence of Physical Injury: Implement safety measures based on patient assessment

## 2023-10-28 NOTE — CARE COORDINATION
Social work: Patient changed to inpatient. Family would prefer [facility]. Referral sent. Post Acute Facility/Agency List     Provided child with the following list, the list includes the overall star ratings obtained from CMS per the Medicare Web site (www.Medicare.gov):     [] [address]  [] Acute Inpatient Rehabilitation Facilities  [x] Skilled Nursing Facilities  [] Home Care    Provided verbal instructions on how to utilize the QR Code to obtain additional detailed star ratings from www. Medicare. gov     offered to print and provide the detailed list:    []Accepted   [x]Declined

## 2023-10-28 NOTE — PLAN OF CARE
Problem: Safety - Adult  Goal: Free from fall injury  10/28/2023 1401 by Carmen Coughlin RN  Outcome: Progressing     Problem: Discharge Planning  Goal: Discharge to home or other facility with appropriate resources  Outcome: Progressing     Problem: Pain  Goal: Verbalizes/displays adequate comfort level or baseline comfort level  10/28/2023 1401 by Carmen Coughlin RN  Outcome: Progressing     Problem: ABCDS Injury Assessment  Goal: Absence of physical injury  10/28/2023 1401 by Carmen Coughlin RN  Outcome: Progressing     Problem: Skin/Tissue Integrity  Goal: Absence of new skin breakdown  Description: 1. Monitor for areas of redness and/or skin breakdown  2. Assess vascular access sites hourly  3. Every 4-6 hours minimum:  Change oxygen saturation probe site  4. Every 4-6 hours:  If on nasal continuous positive airway pressure, respiratory therapy assess nares and determine need for appliance change or resting period.   Outcome: Progressing     Problem: Neurosensory - Adult  Goal: Achieves stable or improved neurological status  Outcome: Progressing     Problem: Neurosensory - Adult  Goal: Achieves maximal functionality and self care  Outcome: Progressing     Problem: Musculoskeletal - Adult  Goal: Return mobility to safest level of function  Outcome: Progressing     Problem: Infection - Adult  Goal: Absence of infection at discharge  Outcome: Progressing

## 2023-10-28 NOTE — PROGRESS NOTES
Transitions of Care Pharmacy Service   Medication Review    The patient's list of current home medications has been reviewed and updated. Source(s) of information: Patient's granddaughter Jose Ramon Sidhu    Please note:   -Jose Ramon Sidhu [facility] neurologist at [facility] stopped her atorvastatin as it is not necessary. She is only on aspirin 81mg daily and tylenol PRN      Please feel free to call with any questions about this encounter. Thank you. Chely Munguia [facility]  Transitions of Care Pharmacy Service  Phone:  [phone]  Fax: [phone]      Prior to Admission medications    Medication Sig Start Date End Date Taking?  Authorizing Provider   aspirin 81 MG EC tablet Take 1 tablet by mouth daily   Yes ProviderMark MD   acetaminophen (TYLENOL) 500 MG tablet Take 1 tablet by mouth every 6 hours as needed for Pain   Yes Mark George MD

## 2023-10-28 NOTE — PROGRESS NOTES
[facility]  Office: [id number], DO, Elva Ritchie, DO, [place] Me, DO, Oralee Fonder Blood, DO, Arleen Nathan MD, Nikolay Carpenter MD, Krysta Meng MD, Jg Banerjee MD,  Dana Jimenez MD, Brandi Parikh MD, Michelle Brannon MD,  Italia Colon MD, Sweta Carlisle MD, Salvatore Cox, DO, Sourav Hurtado MD,  Ale Griffiths, DO, Juno Persaud MD, Destiny Giraldo MD, Maureen Salazar MD, Willy Mendez MD,  Sonya Erickson MD, Adrianna Martinez MD, Betina De La Cruz MD, Andria Luevano MD, Lorri Braga MD, Diallo Stewart, DO, Yanni Coronel, DO, Floyd Shipley MD,  Zara Rosario MD, Holly Solomon, CNP,  Lavon Schmid, CNP, Clayton Black, CNP,  Herminio Treadwell, DNP, Mango Mcclure, CNP, Tia Fontaine, CNP, Laney Pereira, CNP, Jas Gonsalez, CNP, Alicia Pro, CNP, Felisa Dubose, CNP, Penelope Craig, CNS, Mallorie Robles, CNP, Arina Quevedo, [facility]    Progress Note    10/28/2023    1:20 PM    Name:   Jairo Fitzgerald  MRN:     [id number]     Acct:      [de-identified]   Room:   2006/2006-02   Day:  0  Admit Date:  10/26/2023  8:22 PM    PCP:   Rossy Bloom MD  Code Status:  DNR-CCA    Subjective:     C/C:   Chief Complaint   Patient presents with    Headache     Migraine hx and 2 past strokes    Hallucinations    Fall     Last one 12 hours pta, fell off the bed     Social work consult     SNF placement      Interval History Status: not changed. Patient is waking up from sleep  No complaints  She is pleasantly confused  Labs and vitals reviewed  Continue to be in afib    Brief History:     51-year-old female with past medical history of A-fib, not on anticoagulation, recurrent falls, coronary artery disease, migraine, previous right PCA stroke, dementia, left trigeminal nerve schwannoma presents to the hospital with frequent falls at home.   Patient had stroke in 2023 and there was initial

## 2023-10-28 NOTE — PROGRESS NOTES
[facility]   IN-PATIENT SERVICE      NEUROLOGY PROGRESS  NOTE            Date:   10/28/2023  Patient name:  Bernie Dasilva  Date of admission:  10/26/2023  YOB: 1930      Interval History:     No acute events. Continued with waxing/waning mentation overnight. MRI brain completed. History of Present Illness:     80year old female with past medical hx of afib not on AC, CAD, migraine, right PCA stroke, dementia, left trigeminal nerve schwannoma, presents for hallucinations, frequent falls. History obtained from patient, granddaughter over phone (Kerwin Tam) and chart review. Patient follows with Dr. Aretha Delarosa outpatient. She has a history of dementia. Also had a right PCA stroke in April 2023. There was initial concern for possible amyloid angiopathy hence she was not on anticoagulation for A-fib. At baseline, granddaughter reports that she is oriented x1-2, uses a walker for ambulation, requires assistance with ADLs. Over the last 4 to 6 weeks, she reports that patient has been having more hallucinations, trouble sleeping at night. During her hallucinations, she has also been falling, no recent head trauma. Patient was hospitalized in July at [facility] for left-sided facial asymmetry and dysarthria. Her symptoms resolved. MRI with no acute findings. GRE sequences showed improvement in microbleeds, not concerning for amyloid. There was discussion about restarting anticoagulation. Ultimately, decision was left to family whether they wanted to start Eliquis 2.5 twice daily versus continuing aspirin. Granddaughter reports that due to risk of bleeding, age, frequent falls, they opted to defer anticoagulation and continue aspirin 81 mg daily. Since then, she has remained stable on this. No recent episodes of bleeding. Due to progression of her dementia, granddaughter was concerned that she was unable to take care of herself at home and brought her to the ED.   No

## 2023-10-29 LAB
ANION GAP SERPL CALCULATED.3IONS-SCNC: 12 MMOL/L (ref 9–17)
BASOPHILS # BLD: 0.04 K/UL (ref 0–0.2)
BASOPHILS NFR BLD: 1 % (ref 0–2)
BUN SERPL-MCNC: 14 MG/DL (ref 8–23)
BUN/CREAT SERPL: 20 (ref 9–20)
CALCIUM SERPL-MCNC: 9.5 MG/DL (ref 8.6–10.4)
CHLORIDE SERPL-SCNC: 102 MMOL/L (ref 98–107)
CO2 SERPL-SCNC: 25 MMOL/L (ref 20–31)
CREAT SERPL-MCNC: 0.7 MG/DL (ref 0.5–0.9)
EOSINOPHIL # BLD: 0.04 K/UL (ref 0–0.44)
EOSINOPHILS RELATIVE PERCENT: 1 % (ref 1–4)
ERYTHROCYTE [DISTWIDTH] IN BLOOD BY AUTOMATED COUNT: 13.6 % (ref 11.8–14.4)
GFR SERPL CREATININE-BSD FRML MDRD: >60 ML/MIN/1.73M2
GLUCOSE SERPL-MCNC: 108 MG/DL (ref 70–99)
HCT VFR BLD AUTO: 41.9 % (ref 36.3–47.1)
HGB BLD-MCNC: 13.3 G/DL (ref 11.9–15.1)
IMM GRANULOCYTES # BLD AUTO: 0.04 K/UL (ref 0–0.3)
IMM GRANULOCYTES NFR BLD: 1 %
LYMPHOCYTES NFR BLD: 1.43 K/UL (ref 1.1–3.7)
LYMPHOCYTES RELATIVE PERCENT: 29 % (ref 24–43)
MCH RBC QN AUTO: 32.8 PG (ref 25.2–33.5)
MCHC RBC AUTO-ENTMCNC: 31.7 G/DL (ref 28.4–34.8)
MCV RBC AUTO: 103.2 FL (ref 82.6–102.9)
MICROORGANISM SPEC CULT: NO GROWTH
MONOCYTES NFR BLD: 0.39 K/UL (ref 0.1–1.2)
MONOCYTES NFR BLD: 8 % (ref 3–12)
NEUTROPHILS NFR BLD: 60 % (ref 36–65)
NEUTS SEG NFR BLD: 3.07 K/UL (ref 1.5–8.1)
NRBC BLD-RTO: 0 PER 100 WBC
PLATELET # BLD AUTO: 234 K/UL (ref 138–453)
PMV BLD AUTO: 10.2 FL (ref 8.1–13.5)
POTASSIUM SERPL-SCNC: 4.1 MMOL/L (ref 3.7–5.3)
RBC # BLD AUTO: 4.06 M/UL (ref 3.95–5.11)
RBC # BLD: ABNORMAL 10*6/UL
SODIUM SERPL-SCNC: 139 MMOL/L (ref 135–144)
SPECIMEN DESCRIPTION: NORMAL
WBC OTHER # BLD: 5 K/UL (ref 3.5–11.3)

## 2023-10-29 PROCEDURE — 6360000002 HC RX W HCPCS: Performed by: STUDENT IN AN ORGANIZED HEALTH CARE EDUCATION/TRAINING PROGRAM

## 2023-10-29 PROCEDURE — 2580000003 HC RX 258: Performed by: NURSE PRACTITIONER

## 2023-10-29 PROCEDURE — 6370000000 HC RX 637 (ALT 250 FOR IP): Performed by: STUDENT IN AN ORGANIZED HEALTH CARE EDUCATION/TRAINING PROGRAM

## 2023-10-29 PROCEDURE — 85025 COMPLETE CBC W/AUTO DIFF WBC: CPT

## 2023-10-29 PROCEDURE — 80048 BASIC METABOLIC PNL TOTAL CA: CPT

## 2023-10-29 PROCEDURE — 2500000003 HC RX 250 WO HCPCS: Performed by: NURSE PRACTITIONER

## 2023-10-29 PROCEDURE — 6370000000 HC RX 637 (ALT 250 FOR IP): Performed by: NURSE PRACTITIONER

## 2023-10-29 PROCEDURE — 99232 SBSQ HOSP IP/OBS MODERATE 35: CPT | Performed by: STUDENT IN AN ORGANIZED HEALTH CARE EDUCATION/TRAINING PROGRAM

## 2023-10-29 PROCEDURE — 2580000003 HC RX 258: Performed by: STUDENT IN AN ORGANIZED HEALTH CARE EDUCATION/TRAINING PROGRAM

## 2023-10-29 PROCEDURE — 6370000000 HC RX 637 (ALT 250 FOR IP): Performed by: PSYCHIATRY & NEUROLOGY

## 2023-10-29 PROCEDURE — 1200000000 HC SEMI PRIVATE

## 2023-10-29 PROCEDURE — 36415 COLL VENOUS BLD VENIPUNCTURE: CPT

## 2023-10-29 RX ADMIN — SODIUM CHLORIDE, PRESERVATIVE FREE 10 ML: 5 INJECTION INTRAVENOUS at 08:52

## 2023-10-29 RX ADMIN — DICLOFENAC SODIUM 2 G: 10 GEL TOPICAL at 23:12

## 2023-10-29 RX ADMIN — CEFTRIAXONE SODIUM 1000 MG: 1 INJECTION, POWDER, FOR SOLUTION INTRAMUSCULAR; INTRAVENOUS at 08:55

## 2023-10-29 RX ADMIN — ACETAMINOPHEN 650 MG: 325 TABLET ORAL at 19:47

## 2023-10-29 RX ADMIN — ANTI-FUNGAL POWDER MICONAZOLE NITRATE TALC FREE: 1.42 POWDER TOPICAL at 23:12

## 2023-10-29 RX ADMIN — SODIUM CHLORIDE, PRESERVATIVE FREE 10 ML: 5 INJECTION INTRAVENOUS at 19:43

## 2023-10-29 RX ADMIN — ASPIRIN 81 MG: 81 TABLET, COATED ORAL at 08:52

## 2023-10-29 ASSESSMENT — PAIN SCALES - GENERAL: PAINLEVEL_OUTOF10: 3

## 2023-10-29 ASSESSMENT — PAIN DESCRIPTION - LOCATION: LOCATION: HEAD

## 2023-10-29 ASSESSMENT — PAIN - FUNCTIONAL ASSESSMENT: PAIN_FUNCTIONAL_ASSESSMENT: ACTIVITIES ARE NOT PREVENTED

## 2023-10-29 ASSESSMENT — PAIN DESCRIPTION - DESCRIPTORS: DESCRIPTORS: ACHING

## 2023-10-29 NOTE — PLAN OF CARE
Problem: Discharge Planning  Goal: Discharge to home or other facility with appropriate resources  Outcome: Progressing     Problem: Pain  Goal: Verbalizes/displays adequate comfort level or baseline comfort level  Outcome: Progressing     Problem: Safety - Adult  Goal: Free from fall injury  Outcome: Progressing     Problem: ABCDS Injury Assessment  Goal: Absence of physical injury  Outcome: Progressing     Problem: Skin/Tissue Integrity  Goal: Absence of new skin breakdown  Description: 1. Monitor for areas of redness and/or skin breakdown  2. Assess vascular access sites hourly  3. Every 4-6 hours minimum:  Change oxygen saturation probe site  4. Every 4-6 hours:  If on nasal continuous positive airway pressure, respiratory therapy assess nares and determine need for appliance change or resting period.   Outcome: Progressing     Problem: Neurosensory - Adult  Goal: Achieves stable or improved neurological status  Outcome: Progressing  Goal: Achieves maximal functionality and self care  Outcome: Progressing     Problem: Musculoskeletal - Adult  Goal: Return mobility to safest level of function  Outcome: Progressing     Problem: Infection - Adult  Goal: Absence of infection at discharge  Outcome: Progressing

## 2023-10-30 PROCEDURE — 97530 THERAPEUTIC ACTIVITIES: CPT

## 2023-10-30 PROCEDURE — 97112 NEUROMUSCULAR REEDUCATION: CPT

## 2023-10-30 PROCEDURE — 97116 GAIT TRAINING THERAPY: CPT

## 2023-10-30 PROCEDURE — 2580000003 HC RX 258: Performed by: NURSE PRACTITIONER

## 2023-10-30 PROCEDURE — 1200000000 HC SEMI PRIVATE

## 2023-10-30 PROCEDURE — 6360000002 HC RX W HCPCS: Performed by: STUDENT IN AN ORGANIZED HEALTH CARE EDUCATION/TRAINING PROGRAM

## 2023-10-30 PROCEDURE — 97535 SELF CARE MNGMENT TRAINING: CPT

## 2023-10-30 PROCEDURE — 92526 ORAL FUNCTION THERAPY: CPT

## 2023-10-30 PROCEDURE — 6370000000 HC RX 637 (ALT 250 FOR IP): Performed by: PSYCHIATRY & NEUROLOGY

## 2023-10-30 PROCEDURE — 99231 SBSQ HOSP IP/OBS SF/LOW 25: CPT | Performed by: STUDENT IN AN ORGANIZED HEALTH CARE EDUCATION/TRAINING PROGRAM

## 2023-10-30 PROCEDURE — 6370000000 HC RX 637 (ALT 250 FOR IP): Performed by: NURSE PRACTITIONER

## 2023-10-30 PROCEDURE — 2580000003 HC RX 258: Performed by: STUDENT IN AN ORGANIZED HEALTH CARE EDUCATION/TRAINING PROGRAM

## 2023-10-30 RX ADMIN — SODIUM CHLORIDE, PRESERVATIVE FREE 10 ML: 5 INJECTION INTRAVENOUS at 08:25

## 2023-10-30 RX ADMIN — SODIUM CHLORIDE, PRESERVATIVE FREE 10 ML: 5 INJECTION INTRAVENOUS at 20:11

## 2023-10-30 RX ADMIN — ANTI-FUNGAL POWDER MICONAZOLE NITRATE TALC FREE: 1.42 POWDER TOPICAL at 20:11

## 2023-10-30 RX ADMIN — DICLOFENAC SODIUM 2 G: 10 GEL TOPICAL at 08:31

## 2023-10-30 RX ADMIN — ANTI-FUNGAL POWDER MICONAZOLE NITRATE TALC FREE: 1.42 POWDER TOPICAL at 08:25

## 2023-10-30 RX ADMIN — ACETAMINOPHEN 650 MG: 325 TABLET ORAL at 16:13

## 2023-10-30 RX ADMIN — ACETAMINOPHEN 650 MG: 325 TABLET ORAL at 08:28

## 2023-10-30 RX ADMIN — CEFTRIAXONE SODIUM 1000 MG: 1 INJECTION, POWDER, FOR SOLUTION INTRAMUSCULAR; INTRAVENOUS at 08:43

## 2023-10-30 RX ADMIN — ASPIRIN 81 MG: 81 TABLET, COATED ORAL at 08:25

## 2023-10-30 ASSESSMENT — PAIN DESCRIPTION - FREQUENCY
FREQUENCY: CONTINUOUS
FREQUENCY: CONTINUOUS

## 2023-10-30 ASSESSMENT — PAIN DESCRIPTION - LOCATION
LOCATION: HEAD
LOCATION: BACK
LOCATION: HEAD

## 2023-10-30 ASSESSMENT — PAIN DESCRIPTION - ONSET
ONSET: GRADUAL
ONSET: ON-GOING

## 2023-10-30 ASSESSMENT — PAIN DESCRIPTION - DESCRIPTORS
DESCRIPTORS: ACHING
DESCRIPTORS: THROBBING

## 2023-10-30 ASSESSMENT — PAIN SCALES - GENERAL
PAINLEVEL_OUTOF10: 3
PAINLEVEL_OUTOF10: 3
PAINLEVEL_OUTOF10: 8
PAINLEVEL_OUTOF10: 6

## 2023-10-30 ASSESSMENT — PAIN - FUNCTIONAL ASSESSMENT
PAIN_FUNCTIONAL_ASSESSMENT: ACTIVITIES ARE NOT PREVENTED
PAIN_FUNCTIONAL_ASSESSMENT: ACTIVITIES ARE NOT PREVENTED

## 2023-10-30 ASSESSMENT — PAIN DESCRIPTION - PAIN TYPE
TYPE: CHRONIC PAIN
TYPE: ACUTE PAIN

## 2023-10-30 ASSESSMENT — PAIN DESCRIPTION - ORIENTATION: ORIENTATION: LOWER

## 2023-10-30 NOTE — PROGRESS NOTES
[facility]  Office: [phone]  Elieser Kelley, DO, Bessy Keller DO, Cody Alvarez, DO, Ba Molina MD, Eduarda Hall MD, Kelvin Sutherland MD, Yolande Marmolejo MD,  Nickolas Rojas MD, Zacarias Yates MD, Sukhdev Jordan MD,  Lopez Dillon MD, Dez Mobley MD, Hunter Hall DO, Leisa Gabriel MD,  Rubin Narayan DO, Rey Alvarez MD, Dexter Ro MD, Linnea Wagner MD, Tashia Kaufman MD,  Seda Pro MD, Jacinto Vizcarra MD, Jennifer Waldron MD, Rosa Lane MD, Cuong Hodges MD, Brad Tan, DO, Alicia Soto DO, Addy Laguerre MD,  Gricelda Lakhani MD, Wayne Martinez, CNP,  Corazon Webb, CNP, Surjit Zepeda, CNP,  Sommer Camilo, DNP, Rufino Sahu, CNP, Penny Duarte, CNP, Martha Kiran, CNP, Malu Hui, CNP, Penelope Fajardo, CNP, Saba Agudelo, CNP, Ty Alves, CNS, Marquis Navas, CNP, Jason Na, [facility]    Progress Note    10/30/2023    1:19 PM    Name:   Krissy Pugh  MRN:     [id number]     [address]:      [de-identified]   Room:   2006/2006-02  IP Day:  2  Admit Date:  10/26/2023  8:22 PM    PCP:   Taras Natarajan MD  Code Status:  DNR-CCA    Subjective:     C/C:   Chief Complaint   Patient presents with    Headache     Migraine hx and 2 past strokes    Hallucinations    Fall     Last one 12 hours pta, fell off the bed     Social work consult     SNF placement      Interval History Status: not changed. Patient is pleasantly confused  Complains of some back pain  Labs and vitals reviewed    Brief History:     80-year-old female with past medical history of A-fib, not on anticoagulation, recurrent falls, coronary artery disease, migraine, previous right PCA stroke, dementia, left trigeminal nerve schwannoma presents to the hospital with frequent falls at home.   Patient had stroke in 2023 and there was initial concern for possible myeloid angiopathy

## 2023-10-30 NOTE — CARE COORDINATION
Social Work-[facility] denied due to possible LTC. Discussed other options. Her next choice is [facility]. If [facility] is full. Her choices would be [facility].  Sent additional referrals to [facility]/ [facility]

## 2023-10-30 NOTE — CARE COORDINATION
Social Work- [facility] approved patient for admission and can admit tomorrow. Requested 1 PM transport. Carlos Campuzano

## 2023-10-30 NOTE — PROGRESS NOTES
Occupational Therapy  Facility/Department: [facility]  Rehabilitation Occupational Therapy Daily Treatment Note    Date: 10/30/23  Patient Name: Antonieta Phalen       Room:   MRN: [id number]  Account: [de-identified]   : 1930  (80 y.o.) Gender: female        Pt currently functioning below baseline. Recommend daily inpatient skilled therapy at time of discharge to maximize long term outcomes and prevent re-admission. Please refer to AM-PAC score for current level of function. Past Medical History:  has a past medical history of Allergic rhinitis, Angina pectoris ([address]), Atrial fibrillation, chronic (HCC), CAD (coronary artery disease), Congenital heart disease, COPD (chronic obstructive pulmonary disease) ([address]), Headache(784.0), Hypothyroidism, Obesity, Peptic ulcer, Pure hypercholesterolemia, and Urinary incontinence. Past Surgical History:   has a past surgical history that includes Appendectomy and Knee arthroscopy. Restrictions  Restrictions/Precautions: General Precautions, Fall Risk, Bed Alarm, Up as Tolerated  Other position/activity restrictions: Up w/ assist, telemetry, external urinary catheter, RUE IV  Required Braces or Orthoses?: No    Subjective  Subjective: Pt in bed upon arrival. Pt stated \"I just finished breakfast\" and \"I feel sleepy\" but agreeable to therapy. Restrictions/Precautions: General Precautions; Fall Risk;Bed Alarm; Up as Tolerated             Objective     Cognition  Overall Cognitive Status: Exceptions  Arousal/Alertness: Appropriate responses to stimuli  Following Commands: Follows one step commands with increased time; Follows one step commands with repetition; Inconsistently follows commands  Attention Span: Attends with cues to redirect; Difficulty attending to directions  Memory: Decreased short term memory;Decreased recall of recent events;Decreased recall of precautions  Problem Solving: Assistance required to generate solutions;Assistance assist with self care tasks. Pt remains limited by global weakness, decreased activity tolerance, fatigue and cognitive deficits. Skilled OT indicated to increase safety and IND with all functional tasks to ensure a safe return to PLOF. Activity Tolerance: Patient limited by fatigue;Patient limited by endurance;Treatment limited secondary to decreased cognition  Discharge Recommendations: Patient would benefit from continued therapy after discharge  Safety Devices  Safety Devices in place: Yes  Type of devices: All fall risk precautions in place; Left in chair;Nurse notified;Call light within reach; Chair alarm in place;Gait belt;Patient at risk for falls    Patient Education  Education  Education Given To: Patient  Education Provided: Mobility Training; Fall Prevention Strategies;Transfer Training;Energy Conservation;Cognition;Precautions; Safety;Equipment  Education Method: Verbal;Teach Back  Barriers to Learning: Cognition; Hearing  Education Outcome: Continued education needed;Verbalized understanding    Plan  Occupational Therapy Plan  Times Per Week: 4-5x/week 1x/day as tolerated  Current Treatment Recommendations: Balance training;Strengthening; Functional mobility training; Endurance training; Safety education & training;Neuromuscular re-education;Cognitive reorientation;Patient/Caregiver education & training;Equipment evaluation, education, & procurement;Cognitive/Perceptual training;Self-Care / ADL    Goals  Patient Goals   Patient goals : To get stronger, to walk well! Short Term Goals  Time Frame for Short Term Goals: By discharge, pt to demo:  Short Term Goal 1: bed mobility tasks to SBA with Min cues for safety/sequencing and use of bedrails as needed. Short Term Goal 2: ADL transfers and functional mobility tasks to SBA with Min cues for safety and use of AD as needed. Short Term Goal 3: UB ADLs to SBA and LB ADLs to [facility] with Min cues for safety and use of AD/AE/compensatory strategies as needed.   Short

## 2023-10-30 NOTE — PROGRESS NOTES
Physical Therapy  Facility/Department: [facility] SURG  Physical Therapy TREATMENT NOTE    Name: Leonie Conn  : 1930  MRN: [id number]  Date of Service: 10/30/2023    Discharge Recommendations:  Patient would benefit from continued therapy after discharge          Patient Diagnosis(es): The primary encounter diagnosis was Vascular dementia without behavioral disturbance, psychotic disturbance, mood disturbance, or anxiety, unspecified dementia severity ([address]). Diagnoses of Nonintractable headache, unspecified chronicity pattern, unspecified headache type and Ischemic cerebrovascular accident (CVA) ([address]) were also pertinent to this visit. Past Medical History:  has a past medical history of Allergic rhinitis, Angina pectoris ([address]), Atrial fibrillation, chronic (HCC), CAD (coronary artery disease), Congenital heart disease, COPD (chronic obstructive pulmonary disease) ([address]), Headache(784.0), Hypothyroidism, Obesity, Peptic ulcer, Pure hypercholesterolemia, and Urinary incontinence. Past Surgical History:  has a past surgical history that includes Appendectomy and Knee arthroscopy. Assessment   Body Structures, Functions, Activity Limitations Requiring Skilled Therapeutic Intervention: Decreased functional mobility ; Decreased endurance;Decreased balance;Decreased posture;Decreased strength;Decreased sensation;Decreased ADL status  Assessment: Pt with deficits of bed mobility, transfers, ambulation, balance, cognition, posture, safety awareness and endurance this session. With current deficits, Pt INC risk for falls & requires continued PT to maximize independence with functional mobility, balance, safety awareness & activity tolerance to improve overall tolerance of ADL's.   Pt currently functioning below baseline with acute change of functional status due to medical diagnosis with AM-PAC mobility score of , and recommend daily inpatient skilled therapy at time of discharge to maximize long term walker & upright posture for safety/scanning, slowing down for pacing, to inc safety especially with turning for safe maneuvering of R/walker & safe mgt of lines to reduce fall risk  Gait Deviations: Slow Maria Guadalupe;Decreased step length  Distance: 20ft x 2  Pt amb to chair, completed ex's, & then returned to sit EOB. Pt had incontinent brief so assisted brief & [place] change with MAX assist then returned to [facility]: Fair  Sitting - Static: Good  Sitting - Dynamic: Fair;+  Standing - Static: Fair;+ (R/W)  Standing - Dynamic: Fair;- (R/W)  Single Leg Stance R Le  Single Leg Stance L Le  Comments: Pt demonstrated posterior LOB upon standing, needed MOD cues & ModA to bring wt forward to establish safe COG    Ex's  A/AROM Exercises: LAQ, Hip Abd/Add, Heel/Toe-raises, Hip flexion, 10 reps  Circulation/Endurance Exercises: ankle pumps x 20  Functional Mobility Circuit Training: sit to stands x 4 with ModA  Static Sitting Balance Exercises: seated EOB with Marvel foot placement x 10 minutes with CGA  Dynamic Sitting Balance Exercises: UE reaches from seated height to midshin with Rabia x 3 each side  Static Standing Balance Exercises: Pt completed static standing WS x 6 with UB support at R/walker and Rabia  Dynamic Standing Balance Exercises: Pt completed standing mini marching x 3 each leg with UB support at R/walker and HIGH guard  Postural Correction Exercises: Upright posture        OutComes Score      Single Leg Stance Test:  0 seconds,  (<5 seconds = INC FALL RISK)                                                    AM-PAC Score  AM-PAC Inpatient Mobility Raw Score : 12 (10/30/23 1601)  AM-PAC Inpatient T-Scale Score : 35.33 (10/30/23 160)  Mobility Inpatient CMS 0-100% Score: 68.66 (10/30/23 160)  Mobility Inpatient CMS G-Code Modifier : CL (10/30/23 1601)          Tinneti Score       Goals  Short Term Goals  Time Frame for Short Term Goals: 12 visits  Short Term Goal 1:  Inc bed-mobility &

## 2023-10-30 NOTE — PLAN OF CARE
Problem: Discharge Planning  Goal: Discharge to home or other facility with appropriate resources  Outcome: Progressing  Flowsheets (Taken 10/30/2023 0828)  Discharge to home or other facility with appropriate resources: Identify barriers to discharge with patient and caregiver     Problem: Pain  Goal: Verbalizes/displays adequate comfort level or baseline comfort level  Outcome: Progressing     Problem: Safety - Adult  Goal: Free from fall injury  Outcome: Progressing     Problem: ABCDS Injury Assessment  Goal: Absence of physical injury  Outcome: Progressing     Problem: Skin/Tissue Integrity  Goal: Absence of new skin breakdown  Description: 1. Monitor for areas of redness and/or skin breakdown  2. Assess vascular access sites hourly  3. Every 4-6 hours minimum:  Change oxygen saturation probe site  4. Every 4-6 hours:  If on nasal continuous positive airway pressure, respiratory therapy assess nares and determine need for appliance change or resting period.   Outcome: Progressing     Problem: Neurosensory - Adult  Goal: Achieves stable or improved neurological status  Outcome: Progressing  Flowsheets (Taken 10/30/2023 0828)  Achieves stable or improved neurological status: Assess for and report changes in neurological status  Goal: Achieves maximal functionality and self care  Outcome: Progressing  Flowsheets (Taken 10/30/2023 0828)  Achieves maximal functionality and self care: Monitor swallowing and airway patency with patient fatigue and changes in neurological status     Problem: Musculoskeletal - Adult  Goal: Return mobility to safest level of function  Outcome: Progressing  Flowsheets (Taken 10/30/2023 0828)  Return Mobility to Safest Level of Function: Assess patient stability and activity tolerance for standing, transferring and ambulating with or without assistive devices     Problem: Infection - Adult  Goal: Absence of infection at discharge  Outcome: Progressing  Flowsheets (Taken 10/30/2023

## 2023-10-31 VITALS
OXYGEN SATURATION: 97 % | SYSTOLIC BLOOD PRESSURE: 119 MMHG | DIASTOLIC BLOOD PRESSURE: 66 MMHG | BODY MASS INDEX: 24.55 KG/M2 | RESPIRATION RATE: 16 BRPM | HEIGHT: 61 IN | WEIGHT: 130 LBS | HEART RATE: 56 BPM | TEMPERATURE: 97.5 F

## 2023-10-31 LAB — VIT B1 PYROPHOSHATE BLD-SCNC: 75 NMOL/L (ref 70–180)

## 2023-10-31 PROCEDURE — 6360000002 HC RX W HCPCS: Performed by: STUDENT IN AN ORGANIZED HEALTH CARE EDUCATION/TRAINING PROGRAM

## 2023-10-31 PROCEDURE — 2580000003 HC RX 258: Performed by: NURSE PRACTITIONER

## 2023-10-31 PROCEDURE — 2580000003 HC RX 258: Performed by: STUDENT IN AN ORGANIZED HEALTH CARE EDUCATION/TRAINING PROGRAM

## 2023-10-31 PROCEDURE — 99238 HOSP IP/OBS DSCHRG MGMT 30/<: CPT | Performed by: INTERNAL MEDICINE

## 2023-10-31 PROCEDURE — 6370000000 HC RX 637 (ALT 250 FOR IP): Performed by: PSYCHIATRY & NEUROLOGY

## 2023-10-31 RX ADMIN — SODIUM CHLORIDE, PRESERVATIVE FREE 10 ML: 5 INJECTION INTRAVENOUS at 10:43

## 2023-10-31 RX ADMIN — ASPIRIN 81 MG: 81 TABLET, COATED ORAL at 10:43

## 2023-10-31 RX ADMIN — ANTI-FUNGAL POWDER MICONAZOLE NITRATE TALC FREE: 1.42 POWDER TOPICAL at 10:42

## 2023-10-31 RX ADMIN — DICLOFENAC SODIUM 2 G: 10 GEL TOPICAL at 10:42

## 2023-10-31 RX ADMIN — CEFTRIAXONE SODIUM 1000 MG: 1 INJECTION, POWDER, FOR SOLUTION INTRAMUSCULAR; INTRAVENOUS at 10:41

## 2023-10-31 NOTE — PLAN OF CARE
Problem: Safety - Adult  Goal: Free from fall injury  Outcome: Progressing     Problem: ABCDS Injury Assessment  Goal: Absence of physical injury  Outcome: Progressing     Problem: Skin/Tissue Integrity  Goal: Absence of new skin breakdown  Description: 1. Monitor for areas of redness and/or skin breakdown  2. Assess vascular access sites hourly  3. Every 4-6 hours minimum:  Change oxygen saturation probe site  4. Every 4-6 hours:  If on nasal continuous positive airway pressure, respiratory therapy assess nares and determine need for appliance change or resting period.   Outcome: Progressing     Problem: Skin/Tissue Integrity - Adult  Goal: Skin integrity remains intact  Outcome: Progressing  Flowsheets (Taken 10/30/2023 2010)  Skin Integrity Remains Intact: Monitor for areas of redness and/or skin breakdown

## 2023-10-31 NOTE — CARE COORDINATION
Social Work-[facility] will admit today. Liam Gilpaola will transport at [facility]. Orders faxed. Nurse to call report to [phone]. Discussed with granddaughter. She is agreeable with dc plans.  Sherrie Abbott

## 2023-10-31 NOTE — PROGRESS NOTES
Pt discharged to [facility] in stable condition with belongings  Discharge instructions given  Pt denies having any further questions at this time  Patient/family state they have everything they were admitted with.

## 2023-10-31 NOTE — PROGRESS NOTES
[facility]  Office: [phone]  Analisa Paul, DO, Vianney Núñez Blood, DO, Dimitri Valderrama MD, Fannie Rogers MD, Anni Fuentes MD, Jfee Torres MD,  Cindy Shah MD, Chapo Newsome MD, Bárbara Downs MD,  Angy Gan MD, Martha Nunez MD, Angelo Manzo DO, Kiera Tim MD,  Trisha Carreon DO, Ulysses Sprinkle, MD, Lesvia Turk MD, Edith Forman MD, Brenda Butcher MD,  Pedro Pablo Morse MD, Bertram Quiñones MD, Xenia Cassidy MD, Abhijit Tafoya MD, Pavan Sheehan MD, Qi Lagos DO, Nicole Cartwright, DO, Samuel Olvera MD,  Lali Camacho MD, Adalberto Farmer, CNP,  Lam Sanchez, CNP, Violet Herndon, CNP,  Bryanna Tena, DNP, Jeremiah Shoulder, CNP, Tashi Vega, CNP, Avery Allegra, CNP, Yarely Roads, CNP, Allen Cabot, CNP, Kelley Keto, CNP, Kina Gamma, CNS, Bernard Plate, CNP, Jorge Palencia, [address] [facility]    Progress Note    10/31/2023    9:50 AM    Name:   Kristy Beaver  MRN:     [id number]     [address]:      [de-identified]   Room:   2006/2006-02  IP Day:  3  Admit Date:  10/26/2023  8:22 PM    PCP:   Shawanda Ott MD  Code Status:  DNR-CCA    Subjective:     C/C:   Chief Complaint   Patient presents with    Headache     Migraine hx and 2 past strokes    Hallucinations    Fall     Last one 12 hours pta, fell off the bed     Social work consult     SNF placement      Interval History Status: not changed.      Denies complaints  Feels ok  Review of Systems:     Constitutional:  negative for chills, fevers, sweats  Respiratory:  negative for cough, dyspnea on exertion, shortness of breath, wheezing  Cardiovascular:  negative for chest pain, chest pressure/discomfort, lower extremity edema, palpitations  Gastrointestinal:  negative for abdominal pain, constipation, diarrhea, nausea, vomiting  Neurological:  negative for dizziness, headache    Medications:

## 2023-10-31 NOTE — PROGRESS NOTES
[address]  Occupational Therapy Not Seen    DATE: 10/31/2023    NAME: Guanaco Coe  MRN: [id number]   : 1930    Patient not seen this date for Occupational Therapy due to:      [] Cancel by RN or physician due to:    [] Hemodialysis    [] Critical Lab Value Level     [] Blood transfusion in progress    [] Acute or unstable cardiovascular status   _MAP < 55 or more than >115  _HR < 40 or > 130    [] Acute or unstable pulmonary status   -FiO2 > 60%   _RR < 5 or >40    _O2 sats < 85%    [] Strict Bedrest    [] Off Unit for surgery or procedure    [] Off Unit for testing       [] Pending imaging to R/O fracture    [x] Refusal by Patient: Pt declined OT due to fatigue from \"not getting any sleep last night. \" Pt is discharging to SNF at 1pm today and just wants to rest until then.     [] Other      [] OT being discontinued at this time. Patient independent. No further needs. [] OT being discontinued at this time as the patient has been transferred to hospice care. No further needs.       HENOK Cox

## 2023-11-03 NOTE — PROGRESS NOTES
Physician Progress Note      PATIENT:               Riki Le  CSN #:                  [id number]  :                       1930  ADMIT DATE:       10/26/2023 8:22 PM  [address] [facility] DATE:        10/31/2023 1:02 PM  RESPONDING  PROVIDER #:        Simona Alvarez DO          QUERY TEXT:    Pt admitted with metabolic encephalopathy. Pt noted to have mild UTI. If   possible, please document in progress notes and discharge summary the   relationship, if any, between metabolic encephalopathy and UTI. The medical record reflects the following:  Risk Factors: UTI  Clinical Indicators: presented with AMS, diagnosed with mild UTI and treated   with short course Rocephin  Treatment: Labs, imaging, monitoring, Rocephin  Options provided:  -- Metabolic encephalopathy due to UTI  -- Metabolic encephalopathy due to, Please document cause. -- Other - I will add my own diagnosis  -- Disagree - Not applicable / Not valid  -- Disagree - Clinically unable to determine / Unknown  -- Refer to Clinical Documentation Reviewer    PROVIDER RESPONSE TEXT:    This patient has metabolic encephalopathy due to UTI.     Query created by: Tamara Cordova on 11/3/2023 2:35 PM      Electronically signed by:  Simona Alvarez DO 11/3/2023 6:11 PM

## 2024-02-16 ENCOUNTER — TELEPHONE (OUTPATIENT)
Dept: FAMILY MEDICINE CLINIC | Age: 89
End: 2024-02-16

## 2024-02-16 NOTE — TELEPHONE ENCOUNTER
Radha from [facility] called asking if we would follow for home care     Pt last seen in office 05/2023     Radha [phone] ok to leave detailed message     Did inform Radha pt has not been seen since 05/2023 she stated pt has been in a facility, advised her pt may need an appt before following home care orders

## 2024-02-16 NOTE — TELEPHONE ENCOUNTER
Will need in person appointment within 30 days of SOC otherwise cannot sign - will follow till then

## 2024-02-20 NOTE — TELEPHONE ENCOUNTER
Tried number on file to reach pt to schedule, not a working number     ISRAEL Garcia to call office back

## 2024-02-21 NOTE — TELEPHONE ENCOUNTER
Jeannette with [facility] called in today to see if Dr. Acevedo would follow patient. Explained patient has not been seen in the last 30 days. No.  Explained we have tried to reach patient and her daughter but unable to do so to get anyone to call us back.    Jeannette Phone: [phone]

## 2024-02-29 ENCOUNTER — OFFICE VISIT (OUTPATIENT)
Dept: FAMILY MEDICINE CLINIC | Age: 89
End: 2024-02-29

## 2024-02-29 VITALS
DIASTOLIC BLOOD PRESSURE: 76 MMHG | OXYGEN SATURATION: 98 % | SYSTOLIC BLOOD PRESSURE: 120 MMHG | TEMPERATURE: 98 F | HEART RATE: 62 BPM

## 2024-02-29 DIAGNOSIS — J44.9 CHRONIC OBSTRUCTIVE PULMONARY DISEASE, UNSPECIFIED COPD TYPE (HCC): ICD-10-CM

## 2024-02-29 DIAGNOSIS — G70.00 MYASTHENIA GRAVIS WITHOUT (ACUTE) EXACERBATION (HCC): ICD-10-CM

## 2024-02-29 DIAGNOSIS — G47.9 SLEEPING DIFFICULTY: ICD-10-CM

## 2024-02-29 DIAGNOSIS — F32.1 CURRENT MODERATE EPISODE OF MAJOR DEPRESSIVE DISORDER, UNSPECIFIED WHETHER RECURRENT (HCC): ICD-10-CM

## 2024-02-29 DIAGNOSIS — F01.B0 MODERATE VASCULAR DEMENTIA WITHOUT BEHAVIORAL DISTURBANCE, PSYCHOTIC DISTURBANCE, MOOD DISTURBANCE, OR ANXIETY (HCC): ICD-10-CM

## 2024-02-29 DIAGNOSIS — I48.91 HYPERCOAGULABLE STATE DUE TO ATRIAL FIBRILLATION, UNSPECIFIED TYPE (HCC): ICD-10-CM

## 2024-02-29 DIAGNOSIS — Z09 HOSPITAL DISCHARGE FOLLOW-UP: Primary | ICD-10-CM

## 2024-02-29 DIAGNOSIS — N39.498 OTHER URINARY INCONTINENCE: ICD-10-CM

## 2024-02-29 DIAGNOSIS — D68.69 HYPERCOAGULABLE STATE DUE TO ATRIAL FIBRILLATION, UNSPECIFIED TYPE (HCC): ICD-10-CM

## 2024-02-29 DIAGNOSIS — M32.9 PERSONAL HISTORY OF SYSTEMIC LUPUS ERYTHEMATOSUS (SLE) (HCC): ICD-10-CM

## 2024-02-29 DIAGNOSIS — D33.3 SCHWANNOMA OF CRANIAL NERVE (HCC): ICD-10-CM

## 2024-02-29 DIAGNOSIS — N39.45 CONTINUOUS LEAKAGE OF URINE: ICD-10-CM

## 2024-02-29 RX ORDER — BROMPHENIRAMIN/PSEUDOEPHEDRINE 1-15MG/5ML
LIQUID (ML) ORAL
Qty: 48 EACH | Refills: 2 | Status: SHIPPED | OUTPATIENT
Start: 2024-02-29 | End: 2024-02-29 | Stop reason: SDUPTHER

## 2024-02-29 RX ORDER — PEN NEEDLE, DIABETIC 32GX 5/32"
1 NEEDLE, DISPOSABLE MISCELLANEOUS EVERY 4 HOURS
Qty: 200 EACH | Refills: 5 | Status: SHIPPED | OUTPATIENT
Start: 2024-02-29 | End: 2024-02-29 | Stop reason: SDUPTHER

## 2024-02-29 RX ORDER — BROMPHENIRAMIN/PSEUDOEPHEDRINE 1-15MG/5ML
LIQUID (ML) ORAL
Qty: 48 EACH | Refills: 2 | Status: SHIPPED | OUTPATIENT
Start: 2024-02-29

## 2024-02-29 RX ORDER — TRAZODONE HYDROCHLORIDE 50 MG/1
50 TABLET ORAL NIGHTLY
Qty: 90 TABLET | Refills: 1 | Status: SHIPPED | OUTPATIENT
Start: 2024-02-29

## 2024-02-29 RX ORDER — HYDROXYZINE HYDROCHLORIDE 10 MG/1
10 TABLET, FILM COATED ORAL NIGHTLY
Qty: 30 TABLET | Refills: 3 | Status: SHIPPED | OUTPATIENT
Start: 2024-02-29 | End: 2024-02-29 | Stop reason: SDUPTHER

## 2024-02-29 RX ORDER — TRAZODONE HYDROCHLORIDE 50 MG/1
50 TABLET ORAL NIGHTLY
COMMUNITY
Start: 2024-02-25 | End: 2024-02-29 | Stop reason: SDUPTHER

## 2024-02-29 RX ORDER — SERTRALINE HYDROCHLORIDE 25 MG/1
25 TABLET, FILM COATED ORAL DAILY
Qty: 30 TABLET | Refills: 3 | Status: SHIPPED | OUTPATIENT
Start: 2024-02-29

## 2024-02-29 RX ORDER — HYDROXYZINE HYDROCHLORIDE 10 MG/1
10 TABLET, FILM COATED ORAL NIGHTLY
Qty: 30 TABLET | Refills: 3 | Status: SHIPPED | OUTPATIENT
Start: 2024-02-29

## 2024-02-29 RX ORDER — SERTRALINE HYDROCHLORIDE 25 MG/1
25 TABLET, FILM COATED ORAL DAILY
Qty: 30 TABLET | Refills: 3 | Status: SHIPPED | OUTPATIENT
Start: 2024-02-29 | End: 2024-02-29 | Stop reason: SDUPTHER

## 2024-02-29 RX ORDER — PEN NEEDLE, DIABETIC 32GX 5/32"
1 NEEDLE, DISPOSABLE MISCELLANEOUS EVERY 4 HOURS
Qty: 200 EACH | Refills: 5 | Status: SHIPPED | OUTPATIENT
Start: 2024-02-29

## 2024-02-29 RX ORDER — TRAZODONE HYDROCHLORIDE 50 MG/1
50 TABLET ORAL NIGHTLY
Qty: 90 TABLET | Refills: 1 | Status: SHIPPED | OUTPATIENT
Start: 2024-02-29 | End: 2024-02-29 | Stop reason: SDUPTHER

## 2024-02-29 ASSESSMENT — PATIENT HEALTH QUESTIONNAIRE - PHQ9
SUM OF ALL RESPONSES TO PHQ QUESTIONS 1-9: 1
SUM OF ALL RESPONSES TO PHQ9 QUESTIONS 1 & 2: 1
2. FEELING DOWN, DEPRESSED OR HOPELESS: 1
SUM OF ALL RESPONSES TO PHQ QUESTIONS 1-9: 1
SUM OF ALL RESPONSES TO PHQ QUESTIONS 1-9: 1
1. LITTLE INTEREST OR PLEASURE IN DOING THINGS: 0
SUM OF ALL RESPONSES TO PHQ QUESTIONS 1-9: 1

## 2024-02-29 NOTE — PROGRESS NOTES
Post-Discharge Transitional Care Follow Up      Dasha Dasilva   YOB: 1930    Date of Office Visit:  2/29/2024  Date of Hospital Admission: 10/26/23  Date of Hospital Discharge: 10/31/23 - to SNF  Came home 2/23/24  Readmission Risk Score (high >=14%. Medium >=10%):Readmission Risk Score: 13.6      Care management risk score Rising risk (score 2-5) and Complex Care (Scores >=6): No Risk Score On File     Non face to face  following discharge, date last encounter closed (first attempt may have been earlier): *No documented post hospital discharge outreach found in the last 14 days     Call initiated 2 business days of discharge: *No response recorded in the last 14 days     Hospital discharge follow-up  -     CA DISCHARGE MEDS RECONCILED W/ CURRENT OUTPATIENT MED LIST  Other urinary incontinence  -     Incontinence Supply Disposable (BRIEF OVERNIGHT LARGE) MISC; Disp-48 each, R-2, NormalUse daily  Continuous leakage of urine  -     Incontinence Supply Disposable (COMFORT SHIELD ADULT DIAPERS) MISC; 1 each by Does not apply route every 4 hours, Disp-200 each, R-5Normal  Sleeping difficulty  -     hydrOXYzine HCl (ATARAX) 10 MG tablet; Take 1 tablet by mouth nightly, Disp-30 tablet, R-3Normal  -     traZODone (DESYREL) 50 MG tablet; Take 1 tablet by mouth nightly, Disp-90 tablet, R-1Normal  Current moderate episode of major depressive disorder, unspecified whether recurrent (HCC)  -     sertraline (ZOLOFT) 25 MG tablet; Take 1 tablet by mouth daily, Disp-30 tablet, R-3Normal  -     traZODone (DESYREL) 50 MG tablet; Take 1 tablet by mouth nightly, Disp-90 tablet, R-1Normal  Myasthenia gravis without (acute) exacerbation (HCC)  Chronic obstructive pulmonary disease, unspecified COPD type (HCC)  Hypercoagulable state due to atrial fibrillation, unspecified type (HCC)  Personal history of systemic lupus erythematosus (SLE) (HCC)  Schwannoma of cranial nerve (HCC)  Moderate vascular dementia without

## 2024-03-01 ENCOUNTER — TELEPHONE (OUTPATIENT)
Dept: FAMILY MEDICINE CLINIC | Age: 89
End: 2024-03-01

## 2024-03-07 ENCOUNTER — TELEPHONE (OUTPATIENT)
Dept: FAMILY MEDICINE CLINIC | Age: 89
End: 2024-03-07

## 2024-03-07 ASSESSMENT — ENCOUNTER SYMPTOMS
CONSTIPATION: 0
CHEST TIGHTNESS: 0
ANAL BLEEDING: 0
SHORTNESS OF BREATH: 0
CHOKING: 0
WHEEZING: 0
VOMITING: 0
DIARRHEA: 0
COUGH: 0
ABDOMINAL PAIN: 0
BLOOD IN STOOL: 0
NAUSEA: 0

## 2024-03-07 ASSESSMENT — VISUAL ACUITY: OU: 1

## 2024-03-07 NOTE — TELEPHONE ENCOUNTER
YOSEF- Received call from Dianna with [facility]. He went out to do PT eval and family declined visit.   Verbal given for PT eval to be cancelled.    PH: [phone]

## 2024-03-11 ENCOUNTER — TELEPHONE (OUTPATIENT)
Dept: FAMILY MEDICINE CLINIC | Age: 89
End: 2024-03-11

## 2024-03-11 DIAGNOSIS — G70.00 MYASTHENIA GRAVIS WITHOUT (ACUTE) EXACERBATION (HCC): Primary | ICD-10-CM

## 2024-03-11 DIAGNOSIS — F01.B0 MODERATE VASCULAR DEMENTIA WITHOUT BEHAVIORAL DISTURBANCE, PSYCHOTIC DISTURBANCE, MOOD DISTURBANCE, OR ANXIETY (HCC): ICD-10-CM

## 2024-03-11 NOTE — TELEPHONE ENCOUNTER
Tuyet with [facility] calls in today to see if our physician will order for an Evaluation and Treatment for Hospice.    [facility] : [phone]    Please advise and route to clinical staff.